# Patient Record
Sex: FEMALE | Race: WHITE | NOT HISPANIC OR LATINO | Employment: OTHER | RURAL
[De-identification: names, ages, dates, MRNs, and addresses within clinical notes are randomized per-mention and may not be internally consistent; named-entity substitution may affect disease eponyms.]

---

## 2020-11-10 ENCOUNTER — HISTORICAL (OUTPATIENT)
Dept: ADMINISTRATIVE | Facility: HOSPITAL | Age: 85
End: 2020-11-10

## 2021-03-19 DIAGNOSIS — I10 HYPERTENSION, UNSPECIFIED TYPE: Primary | ICD-10-CM

## 2021-03-25 RX ORDER — HYDROCHLOROTHIAZIDE 25 MG/1
25 TABLET ORAL DAILY
Qty: 90 TABLET | Refills: 3 | Status: SHIPPED | OUTPATIENT
Start: 2021-03-25 | End: 2021-04-20 | Stop reason: SINTOL

## 2021-04-12 RX ORDER — LISINOPRIL 40 MG/1
40 TABLET ORAL 2 TIMES DAILY
COMMUNITY
Start: 2021-02-26 | End: 2021-04-21 | Stop reason: SDUPTHER

## 2021-04-12 RX ORDER — CALCIUM CARBONATE 600 MG
600 TABLET ORAL 2 TIMES DAILY
COMMUNITY
End: 2022-10-27

## 2021-04-12 RX ORDER — FERROUS SULFATE 325(65) MG
325 TABLET ORAL DAILY
COMMUNITY
End: 2021-05-04

## 2021-04-12 RX ORDER — TRIMETHOPRIM 100 MG/1
100 TABLET ORAL NIGHTLY
COMMUNITY
Start: 2021-02-19 | End: 2022-07-12

## 2021-04-12 RX ORDER — HYDROCODONE BITARTRATE AND ACETAMINOPHEN 5; 325 MG/1; MG/1
1 TABLET ORAL 2 TIMES DAILY PRN
COMMUNITY
Start: 2021-02-08 | End: 2021-10-13

## 2021-04-12 RX ORDER — AMLODIPINE BESYLATE 5 MG/1
2.5 TABLET ORAL DAILY
COMMUNITY
Start: 2021-03-15 | End: 2021-10-13

## 2021-04-12 RX ORDER — ASPIRIN 81 MG/1
81 TABLET ORAL DAILY
Status: ON HOLD | COMMUNITY
End: 2023-10-16 | Stop reason: HOSPADM

## 2021-04-20 ENCOUNTER — OFFICE VISIT (OUTPATIENT)
Dept: CARDIOLOGY | Facility: CLINIC | Age: 86
End: 2021-04-20
Payer: MEDICARE

## 2021-04-20 VITALS
OXYGEN SATURATION: 97 % | HEART RATE: 97 BPM | WEIGHT: 139 LBS | SYSTOLIC BLOOD PRESSURE: 144 MMHG | HEIGHT: 64 IN | BODY MASS INDEX: 23.73 KG/M2 | DIASTOLIC BLOOD PRESSURE: 82 MMHG

## 2021-04-20 DIAGNOSIS — I10 HYPERTENSION, UNSPECIFIED TYPE: Primary | ICD-10-CM

## 2021-04-20 DIAGNOSIS — M54.9 BACK PAIN, UNSPECIFIED BACK LOCATION, UNSPECIFIED BACK PAIN LATERALITY, UNSPECIFIED CHRONICITY: ICD-10-CM

## 2021-04-20 DIAGNOSIS — R60.0 LOWER EXTREMITY EDEMA: ICD-10-CM

## 2021-04-20 PROCEDURE — 93010 ELECTROCARDIOGRAM REPORT: CPT | Mod: S$PBB,,, | Performed by: INTERNAL MEDICINE

## 2021-04-20 PROCEDURE — 99214 PR OFFICE/OUTPT VISIT, EST, LEVL IV, 30-39 MIN: ICD-10-PCS | Mod: S$PBB,,, | Performed by: INTERNAL MEDICINE

## 2021-04-20 PROCEDURE — 99999 PR PBB SHADOW E&M-EST. PATIENT-LVL IV: ICD-10-PCS | Mod: PBBFAC,,, | Performed by: INTERNAL MEDICINE

## 2021-04-20 PROCEDURE — 99214 OFFICE O/P EST MOD 30 MIN: CPT | Mod: S$PBB,,, | Performed by: INTERNAL MEDICINE

## 2021-04-20 PROCEDURE — 93005 ELECTROCARDIOGRAM TRACING: CPT | Mod: PBBFAC | Performed by: INTERNAL MEDICINE

## 2021-04-20 PROCEDURE — 99999 PR PBB SHADOW E&M-EST. PATIENT-LVL IV: CPT | Mod: PBBFAC,,, | Performed by: INTERNAL MEDICINE

## 2021-04-20 PROCEDURE — 99214 OFFICE O/P EST MOD 30 MIN: CPT | Mod: PBBFAC | Performed by: INTERNAL MEDICINE

## 2021-04-20 PROCEDURE — 93010 EKG 12-LEAD: ICD-10-PCS | Mod: S$PBB,,, | Performed by: INTERNAL MEDICINE

## 2021-04-21 RX ORDER — FUROSEMIDE 20 MG/1
20 TABLET ORAL DAILY
Qty: 90 TABLET | Refills: 3 | Status: SHIPPED | OUTPATIENT
Start: 2021-04-21 | End: 2021-06-22

## 2021-04-21 RX ORDER — LISINOPRIL 40 MG/1
40 TABLET ORAL 2 TIMES DAILY
Qty: 180 TABLET | Refills: 3 | Status: SHIPPED | OUTPATIENT
Start: 2021-04-21 | End: 2022-08-26 | Stop reason: SDUPTHER

## 2021-04-25 PROBLEM — R60.0 LOWER EXTREMITY EDEMA: Status: ACTIVE | Noted: 2021-04-25

## 2021-04-25 PROBLEM — I10 HYPERTENSION: Status: ACTIVE | Noted: 2021-04-25

## 2021-04-25 PROBLEM — M54.9 BACK PAIN: Status: ACTIVE | Noted: 2021-04-25

## 2021-05-04 ENCOUNTER — OFFICE VISIT (OUTPATIENT)
Dept: CARDIOLOGY | Facility: CLINIC | Age: 86
End: 2021-05-04
Payer: MEDICARE

## 2021-05-04 VITALS
WEIGHT: 153 LBS | SYSTOLIC BLOOD PRESSURE: 170 MMHG | HEIGHT: 64 IN | BODY MASS INDEX: 26.12 KG/M2 | HEART RATE: 109 BPM | DIASTOLIC BLOOD PRESSURE: 100 MMHG

## 2021-05-04 DIAGNOSIS — R60.0 BILATERAL LOWER EXTREMITY EDEMA: ICD-10-CM

## 2021-05-04 DIAGNOSIS — I10 HYPERTENSION, UNSPECIFIED TYPE: Primary | ICD-10-CM

## 2021-05-04 PROCEDURE — 99214 PR OFFICE/OUTPT VISIT, EST, LEVL IV, 30-39 MIN: ICD-10-PCS | Mod: S$PBB,,, | Performed by: INTERNAL MEDICINE

## 2021-05-04 PROCEDURE — 99214 OFFICE O/P EST MOD 30 MIN: CPT | Mod: PBBFAC | Performed by: INTERNAL MEDICINE

## 2021-05-04 PROCEDURE — 99214 OFFICE O/P EST MOD 30 MIN: CPT | Mod: S$PBB,,, | Performed by: INTERNAL MEDICINE

## 2021-05-04 PROCEDURE — 99214 OFFICE O/P EST MOD 30 MIN: CPT | Mod: PBBFAC,,, | Performed by: INTERNAL MEDICINE

## 2021-05-07 DIAGNOSIS — R60.0 LOWER EXTREMITY EDEMA: Primary | ICD-10-CM

## 2021-05-07 RX ORDER — METOLAZONE 5 MG/1
5 TABLET ORAL DAILY
Qty: 3 TABLET | Refills: 0 | Status: SHIPPED | OUTPATIENT
Start: 2021-05-07 | End: 2021-05-09

## 2021-05-09 RX ORDER — METOLAZONE 2.5 MG/1
TABLET ORAL
Qty: 6 TABLET | Refills: 3 | Status: SHIPPED | OUTPATIENT
Start: 2021-05-09 | End: 2021-10-13

## 2021-06-22 RX ORDER — FUROSEMIDE 20 MG/1
20 TABLET ORAL DAILY
COMMUNITY
End: 2022-08-26 | Stop reason: SDUPTHER

## 2021-10-13 ENCOUNTER — OFFICE VISIT (OUTPATIENT)
Dept: FAMILY MEDICINE | Facility: CLINIC | Age: 86
End: 2021-10-13
Payer: MEDICARE

## 2021-10-13 ENCOUNTER — APPOINTMENT (OUTPATIENT)
Dept: RADIOLOGY | Facility: CLINIC | Age: 86
End: 2021-10-13
Attending: NURSE PRACTITIONER
Payer: MEDICARE

## 2021-10-13 VITALS
SYSTOLIC BLOOD PRESSURE: 142 MMHG | BODY MASS INDEX: 26.58 KG/M2 | DIASTOLIC BLOOD PRESSURE: 70 MMHG | RESPIRATION RATE: 18 BRPM | HEIGHT: 63 IN | HEART RATE: 122 BPM | OXYGEN SATURATION: 98 % | WEIGHT: 150 LBS | TEMPERATURE: 99 F

## 2021-10-13 DIAGNOSIS — M79.671 RIGHT FOOT PAIN: Primary | ICD-10-CM

## 2021-10-13 DIAGNOSIS — M25.571 ACUTE RIGHT ANKLE PAIN: ICD-10-CM

## 2021-10-13 DIAGNOSIS — B35.3 TINEA PEDIS OF RIGHT FOOT: ICD-10-CM

## 2021-10-13 PROCEDURE — 73600 X-RAY EXAM OF ANKLE: CPT | Mod: 26,RT,, | Performed by: RADIOLOGY

## 2021-10-13 PROCEDURE — 73600 XR ANKLE 2 VIEW RIGHT: ICD-10-PCS | Mod: 26,RT,, | Performed by: RADIOLOGY

## 2021-10-13 PROCEDURE — 99213 PR OFFICE/OUTPT VISIT, EST, LEVL III, 20-29 MIN: ICD-10-PCS | Mod: ,,, | Performed by: NURSE PRACTITIONER

## 2021-10-13 PROCEDURE — 73600 X-RAY EXAM OF ANKLE: CPT | Mod: TC,RHCUB,RT | Performed by: NURSE PRACTITIONER

## 2021-10-13 PROCEDURE — 99213 OFFICE O/P EST LOW 20 MIN: CPT | Mod: ,,, | Performed by: NURSE PRACTITIONER

## 2021-10-13 RX ORDER — HYDROCODONE BITARTRATE AND ACETAMINOPHEN 5; 325 MG/1; MG/1
1 TABLET ORAL EVERY 6 HOURS PRN
Qty: 10 TABLET | Refills: 0 | Status: SHIPPED | OUTPATIENT
Start: 2021-10-13 | End: 2022-07-12

## 2021-10-13 RX ORDER — KETOCONAZOLE 20 MG/G
CREAM TOPICAL 2 TIMES DAILY
Qty: 30 G | Refills: 0 | Status: SHIPPED | OUTPATIENT
Start: 2021-10-13 | End: 2022-07-12

## 2021-10-13 RX ORDER — CEPHALEXIN 250 MG/1
250 CAPSULE ORAL 4 TIMES DAILY
Qty: 28 CAPSULE | Refills: 0 | Status: SHIPPED | OUTPATIENT
Start: 2021-10-13 | End: 2022-07-12

## 2021-10-17 ENCOUNTER — HOSPITAL ENCOUNTER (EMERGENCY)
Facility: HOSPITAL | Age: 86
Discharge: HOME | End: 2021-10-17
Attending: EMERGENCY MEDICINE
Payer: MEDICARE

## 2021-10-17 VITALS
TEMPERATURE: 98 F | DIASTOLIC BLOOD PRESSURE: 100 MMHG | BODY MASS INDEX: 25.61 KG/M2 | RESPIRATION RATE: 14 BRPM | WEIGHT: 150 LBS | HEIGHT: 64 IN | HEART RATE: 91 BPM | OXYGEN SATURATION: 97 % | SYSTOLIC BLOOD PRESSURE: 183 MMHG

## 2021-10-17 DIAGNOSIS — R53.1 GENERALIZED WEAKNESS: ICD-10-CM
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
## 2021-10-17 DIAGNOSIS — I10 HYPERTENSION: ICD-10-CM

## 2021-10-17 DIAGNOSIS — R60.0 LOWER EXTREMITY EDEMA: ICD-10-CM
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
## 2021-10-17 DIAGNOSIS — M19.90 OSTEOARTHRITIS, UNSPECIFIED OSTEOARTHRITIS TYPE, UNSPECIFIED SITE: ICD-10-CM

## 2021-10-17 DIAGNOSIS — M54.9 BACK PAIN: ICD-10-CM

## 2021-10-17 LAB
ALBUMIN SERPL BCP-MCNC: 3.5 G/DL (ref 3.5–5)
ALBUMIN/GLOB SERPL: 0.9 {RATIO}
ALP SERPL-CCNC: 103 U/L (ref 55–142)
ALT SERPL W P-5'-P-CCNC: 27 U/L (ref 13–56)
ANION GAP SERPL CALCULATED.3IONS-SCNC: 12 MMOL/L (ref 7–16)
APTT PPP: 34.3 SECONDS (ref 25.2–37.3)
AST SERPL W P-5'-P-CCNC: 23 U/L (ref 15–37)
BASOPHILS # BLD AUTO: 0.03 K/UL (ref 0–0.2)
BASOPHILS NFR BLD AUTO: 0.3 % (ref 0–1)
BILIRUB SERPL-MCNC: 0.4 MG/DL (ref 0–1.2)
BILIRUB UR QL STRIP: NEGATIVE
BUN SERPL-MCNC: 14 MG/DL (ref 7–18)
BUN/CREAT SERPL: 15 (ref 6–20)
CALCIUM SERPL-MCNC: 9.8 MG/DL (ref 8.5–10.1)
CHLORIDE SERPL-SCNC: 98 MMOL/L (ref 98–107)
CLARITY UR: CLEAR
CO2 SERPL-SCNC: 32 MMOL/L (ref 21–32)
COLOR UR: NORMAL
CREAT SERPL-MCNC: 0.95 MG/DL (ref 0.55–1.02)
DIFFERENTIAL METHOD BLD: ABNORMAL
EOSINOPHIL # BLD AUTO: 0.01 K/UL (ref 0–0.5)
EOSINOPHIL NFR BLD AUTO: 0.1 % (ref 1–4)
ERYTHROCYTE [DISTWIDTH] IN BLOOD BY AUTOMATED COUNT: 14.4 % (ref 11.5–14.5)
GLOBULIN SER-MCNC: 3.9 G/DL (ref 2–4)
GLUCOSE SERPL-MCNC: 99 MG/DL (ref 74–106)
GLUCOSE UR STRIP-MCNC: NEGATIVE MG/DL
HCT VFR BLD AUTO: 42.2 % (ref 38–47)
HGB BLD-MCNC: 13.8 G/DL (ref 12–16)
IMM GRANULOCYTES # BLD AUTO: 0.03 K/UL (ref 0–0.04)
IMM GRANULOCYTES NFR BLD: 0.3 % (ref 0–0.4)
INR BLD: 0.96 (ref 0.9–1.1)
KETONES UR STRIP-SCNC: NEGATIVE MG/DL
LEUKOCYTE ESTERASE UR QL STRIP: NEGATIVE
LYMPHOCYTES # BLD AUTO: 1.88 K/UL (ref 1–4.8)
LYMPHOCYTES NFR BLD AUTO: 21.1 % (ref 27–41)
MAGNESIUM SERPL-MCNC: 2.2 MG/DL (ref 1.7–2.3)
MCH RBC QN AUTO: 28.3 PG (ref 27–31)
MCHC RBC AUTO-ENTMCNC: 32.7 G/DL (ref 32–36)
MCV RBC AUTO: 86.5 FL (ref 80–96)
MONOCYTES # BLD AUTO: 0.63 K/UL (ref 0–0.8)
MONOCYTES NFR BLD AUTO: 7.1 % (ref 2–6)
MPC BLD CALC-MCNC: 8.8 FL (ref 9.4–12.4)
NEUTROPHILS # BLD AUTO: 6.33 K/UL (ref 1.8–7.7)
NEUTROPHILS NFR BLD AUTO: 71.1 % (ref 53–65)
NITRITE UR QL STRIP: NEGATIVE
NRBC # BLD AUTO: 0 X10E3/UL
NRBC, AUTO (.00): 0 %
PH UR STRIP: 8 PH UNITS
PLATELET # BLD AUTO: 320 K/UL (ref 150–400)
POTASSIUM SERPL-SCNC: 3.7 MMOL/L (ref 3.5–5.1)
PROT SERPL-MCNC: 7.4 G/DL (ref 6.4–8.2)
PROT UR QL STRIP: NEGATIVE
PROTHROMBIN TIME: 12.8 SECONDS (ref 11.7–14.7)
RBC # BLD AUTO: 4.88 M/UL (ref 4.2–5.4)
RBC # UR STRIP: NEGATIVE /UL
SODIUM SERPL-SCNC: 138 MMOL/L (ref 136–145)
SP GR UR STRIP: 1.01
TROPONIN I SERPL-MCNC: <0.017 NG/ML
UROBILINOGEN UR STRIP-ACNC: 0.2 MG/DL
WBC # BLD AUTO: 8.91 K/UL (ref 4.5–11)

## 2021-10-17 PROCEDURE — 84484 ASSAY OF TROPONIN QUANT: CPT | Performed by: EMERGENCY MEDICINE

## 2021-10-17 PROCEDURE — 99283 EMERGENCY DEPT VISIT LOW MDM: CPT | Mod: ,,, | Performed by: EMERGENCY MEDICINE

## 2021-10-17 PROCEDURE — 85610 PROTHROMBIN TIME: CPT | Performed by: EMERGENCY MEDICINE

## 2021-10-17 PROCEDURE — 85025 COMPLETE CBC W/AUTO DIFF WBC: CPT | Performed by: EMERGENCY MEDICINE

## 2021-10-17 PROCEDURE — 81003 URINALYSIS AUTO W/O SCOPE: CPT | Performed by: EMERGENCY MEDICINE

## 2021-10-17 PROCEDURE — 99285 EMERGENCY DEPT VISIT HI MDM: CPT | Mod: 25

## 2021-10-17 PROCEDURE — 83735 ASSAY OF MAGNESIUM: CPT | Performed by: EMERGENCY MEDICINE

## 2021-10-17 PROCEDURE — 93010 EKG 12-LEAD: ICD-10-PCS | Mod: ,,, | Performed by: INTERNAL MEDICINE

## 2021-10-17 PROCEDURE — 36415 COLL VENOUS BLD VENIPUNCTURE: CPT | Performed by: EMERGENCY MEDICINE

## 2021-10-17 PROCEDURE — 93005 ELECTROCARDIOGRAM TRACING: CPT

## 2021-10-17 PROCEDURE — 99283 PR EMERGENCY DEPT VISIT,LEVEL III: ICD-10-PCS | Mod: ,,, | Performed by: EMERGENCY MEDICINE

## 2021-10-17 PROCEDURE — 93010 ELECTROCARDIOGRAM REPORT: CPT | Mod: ,,, | Performed by: INTERNAL MEDICINE

## 2021-10-17 PROCEDURE — 80053 COMPREHEN METABOLIC PANEL: CPT | Performed by: EMERGENCY MEDICINE

## 2021-10-17 PROCEDURE — 85730 THROMBOPLASTIN TIME PARTIAL: CPT | Performed by: EMERGENCY MEDICINE

## 2022-01-14 ENCOUNTER — CLINICAL SUPPORT (OUTPATIENT)
Dept: UROLOGY | Facility: CLINIC | Age: 87
End: 2022-01-14
Payer: MEDICARE

## 2022-01-14 DIAGNOSIS — N39.0 URINARY TRACT INFECTION WITHOUT HEMATURIA, SITE UNSPECIFIED: Primary | ICD-10-CM

## 2022-01-14 PROCEDURE — 87086 CULTURE, URINE: ICD-10-PCS | Mod: ,,, | Performed by: CLINICAL MEDICAL LABORATORY

## 2022-01-14 PROCEDURE — 87186 SC STD MICRODIL/AGAR DIL: CPT | Mod: ,,, | Performed by: CLINICAL MEDICAL LABORATORY

## 2022-01-14 PROCEDURE — 87077 CULTURE AEROBIC IDENTIFY: CPT | Mod: ,,, | Performed by: CLINICAL MEDICAL LABORATORY

## 2022-01-14 PROCEDURE — 87077 CULTURE, URINE: ICD-10-PCS | Mod: ,,, | Performed by: CLINICAL MEDICAL LABORATORY

## 2022-01-14 PROCEDURE — 99212 OFFICE O/P EST SF 10 MIN: CPT | Mod: PBBFAC | Performed by: UROLOGY

## 2022-01-14 PROCEDURE — 87186 CULTURE, URINE: ICD-10-PCS | Mod: ,,, | Performed by: CLINICAL MEDICAL LABORATORY

## 2022-01-14 PROCEDURE — 87086 URINE CULTURE/COLONY COUNT: CPT | Mod: ,,, | Performed by: CLINICAL MEDICAL LABORATORY

## 2022-01-14 NOTE — PROGRESS NOTES
Patient's family brought a urine specimen by to be sent for culture due to painful urinating. Cipro 500mg one bid #6 samples given pending results.

## 2022-01-16 LAB
UA COMPLETE W REFLEX CULTURE PNL UR: ABNORMAL
UA COMPLETE W REFLEX CULTURE PNL UR: ABNORMAL

## 2022-01-17 DIAGNOSIS — N39.0 URINARY TRACT INFECTION WITHOUT HEMATURIA, SITE UNSPECIFIED: Primary | ICD-10-CM

## 2022-01-17 RX ORDER — FLUCONAZOLE 100 MG/1
100 TABLET ORAL DAILY
Qty: 8 TABLET | Refills: 0 | Status: SHIPPED | OUTPATIENT
Start: 2022-01-17 | End: 2022-07-12

## 2022-01-17 RX ORDER — CEFUROXIME AXETIL 250 MG/1
250 TABLET ORAL 2 TIMES DAILY
Qty: 20 TABLET | Refills: 0 | Status: SHIPPED | OUTPATIENT
Start: 2022-01-17 | End: 2022-01-27

## 2022-01-25 NOTE — PROGRESS NOTES
Cardiology Clinic Note:    PCP: Primary Doctor No    REFERRING PHYSICIAN: Primary Doctor No    CHIEF COMPLAINT: No chief complaint on file.       HISTORY OF PRESENT ILLNESS:  Purnima Cardona is a 88 y.o. female who presents for evaluation of hypertension.  Daughter presents with pt.     Pt reports blood pressure has been  elevated. Patient states she is walking around with walker.    Patient denies dizziness.  Denies chest pain, pressure, tightness or squeezing.      Review of Systems   Constitutional: Negative for diaphoresis, malaise/fatigue, night sweats and weight gain.   HENT: Negative for congestion, ear pain, hearing loss, nosebleeds and sore throat.    Eyes: Negative for blurred vision, double vision, pain, photophobia and visual disturbance.   Cardiovascular: Negative for chest pain, claudication, dyspnea on exertion, irregular heartbeat, leg swelling, near-syncope, orthopnea, palpitations and syncope.   Respiratory: Negative for cough, shortness of breath, sleep disturbances due to breathing, snoring and wheezing.    Endocrine: Negative for cold intolerance, heat intolerance, polydipsia, polyphagia and polyuria.   Hematologic/Lymphatic: Negative for bleeding problem. Does not bruise/bleed easily.   Skin: Negative for dry skin, flushing, itching, rash and skin cancer.   Musculoskeletal: Negative for arthritis, back pain, falls, joint pain, muscle cramps, muscle weakness and myalgias.   Gastrointestinal: Negative for abdominal pain, change in bowel habit, constipation, diarrhea, dysphagia, heartburn, nausea and vomiting.   Genitourinary: Negative for bladder incontinence, dysuria, flank pain, frequency and nocturia.   Neurological: Negative for dizziness, focal weakness, headaches, light-headedness, loss of balance, numbness, paresthesias and seizures.   Psychiatric/Behavioral: Negative for depression, memory loss and substance abuse. The patient is not nervous/anxious.    Allergic/Immunologic: Negative  for environmental allergies.          PAST MEDICAL HISTORY:  Past Medical History:   Diagnosis Date    Abnormal EKG     Essential hypertension     Hyperlipidemia     Shortness of breath        PAST SURGICAL HISTORY:  No past surgical history on file.    SOCIAL HISTORY:  Social History     Socioeconomic History    Marital status:    Tobacco Use    Smoking status: Never Smoker    Smokeless tobacco: Never Used   Substance and Sexual Activity    Alcohol use: Never    Drug use: Never    Sexual activity: Not Currently       FAMILY HISTORY:  Family History   Problem Relation Age of Onset    Breast cancer Mother     Stroke Father        ALLERGIES:  Allergies as of 2022 - Reviewed 10/17/2021   Allergen Reaction Noted    Clonidine  2021    Hiprex [methenamine hippurate]  2021    Sulfa (sulfonamide antibiotics)  2021         MEDICATIONS:  Current Outpatient Medications on File Prior to Visit   Medication Sig Dispense Refill    aspirin (ECOTRIN) 81 MG EC tablet Take 81 mg by mouth once daily.      calcium carbonate (OS-NASREEN) 600 mg calcium (1,500 mg) Tab Take 600 mg by mouth 2 (two) times a day.      [] cefUROXime (CEFTIN) 250 MG tablet Take 1 tablet (250 mg total) by mouth 2 (two) times daily. for 10 days 20 tablet 0    furosemide (LASIX) 20 MG tablet Take 20 mg by mouth 3 (three) times a week. Take 2 tablets(20mg) in the AM on I[facility]xs-Vsbn[facility]zhij      lisinopriL (PRINIVIL,ZESTRIL) 40 MG tablet Take 1 tablet (40 mg total) by mouth 2 (two) times daily. 180 tablet 3    potassium 99 mg Tab Take 1 tablet by mouth once daily.      cephALEXin (KEFLEX) 250 MG capsule Take 1 capsule (250 mg total) by mouth 4 (four) times daily. (Patient not taking: Reported on 2022) 28 capsule 0    fluconazole (DIFLUCAN) 100 MG tablet Take 1 tablet (100 mg total) by mouth once daily. Patient to take 2 tablets by mouth on day one (1), then 1 tablet by mouth daily thereafter (Patient  "not taking: Reported on 1/26/2022) 8 tablet 0    HYDROcodone-acetaminophen (NORCO) 5-325 mg per tablet Take 1 tablet by mouth every 6 (six) hours as needed for Pain. (Patient not taking: Reported on 1/26/2022) 10 tablet 0    ketoconazole (NIZORAL) 2 % cream Apply topically 2 (two) times daily. To affected area (Patient not taking: Reported on 1/26/2022) 30 g 0    trimethoprim (TRIMPEX) 100 mg Tab Take 100 mg by mouth every evening.       No current facility-administered medications on file prior to visit.          PHYSICAL EXAM:  Blood pressure (!) 148/96, pulse 107, resp. rate 16, height 5' 4" (1.626 m), weight 60.3 kg (133 lb), SpO2 96 %.  Wt Readings from Last 3 Encounters:   01/26/22 60.3 kg (133 lb)   10/17/21 68 kg (150 lb)   10/13/21 68 kg (150 lb)      Body mass index is 22.83 kg/m².    Physical Exam  Vitals and nursing note reviewed.   Constitutional:       Appearance: Normal appearance. She is normal weight.   HENT:      Head: Normocephalic and atraumatic.      Right Ear: External ear normal.      Left Ear: External ear normal.   Eyes:      General: No scleral icterus.        Right eye: No discharge.         Left eye: No discharge.      Extraocular Movements: Extraocular movements intact.      Conjunctiva/sclera: Conjunctivae normal.      Pupils: Pupils are equal, round, and reactive to light.   Cardiovascular:      Rate and Rhythm: Normal rate and regular rhythm.      Pulses: Normal pulses.      Heart sounds: Normal heart sounds. No murmur heard.  No friction rub. No gallop.    Pulmonary:      Effort: Pulmonary effort is normal.      Breath sounds: Normal breath sounds. No wheezing, rhonchi or rales.   Chest:      Chest wall: No tenderness.   Abdominal:      General: Abdomen is flat. Bowel sounds are normal. There is no distension.      Palpations: Abdomen is soft.      Tenderness: There is no abdominal tenderness. There is no guarding or rebound.   Musculoskeletal:         General: No swelling or " tenderness. Normal range of motion.      Cervical back: Normal range of motion and neck supple.   Skin:     General: Skin is warm and dry.      Findings: No erythema or rash.   Neurological:      General: No focal deficit present.      Mental Status: She is alert and oriented to person, place, and time.      Cranial Nerves: No cranial nerve deficit.      Motor: No weakness.      Gait: Gait normal.   Psychiatric:         Mood and Affect: Mood normal.         Behavior: Behavior normal.         Thought Content: Thought content normal.         Judgment: Judgment normal.          LABS REVIEWED:  Lab Results   Component Value Date    WBC 8.91 10/17/2021    RBC 4.88 10/17/2021    HGB 13.8 10/17/2021    HCT 42.2 10/17/2021    MCV 86.5 10/17/2021    MCH 28.3 10/17/2021    MCHC 32.7 10/17/2021    RDW 14.4 10/17/2021     10/17/2021    MPV 8.8 (L) 10/17/2021    NRBC 0.0 10/17/2021    INR 0.96 10/17/2021     Lab Results   Component Value Date     10/17/2021    K 3.7 10/17/2021    CL 98 10/17/2021    CO2 32 10/17/2021    BUN 14 10/17/2021    MG 2.2 10/17/2021     Lab Results   Component Value Date    AST 23 10/17/2021    ALT 27 10/17/2021     Lab Results   Component Value Date    GLU 99 10/17/2021     No results found for: CHOL, HDL, TRIG, CHOLHDL    CARDIAC STUDIES REVIEWED:    OTHER IMAGING STUDIES REVIEWED:        ASSESSMENT:   There are no diagnoses linked to this encounter.      PLAN:  1.  Hypertension, not controlled, restart amlodipine 5 mg once daily  2.  Lower extremity edema, improved on Lasix   3.  Falls - orthostatics checked  130/80 sitting, decreased to 110/70 standing, asymptomatic, discussed careful positional changes. .

## 2022-01-26 ENCOUNTER — DOCUMENTATION ONLY (OUTPATIENT)
Dept: CARDIOLOGY | Facility: CLINIC | Age: 87
End: 2022-01-26
Payer: MEDICARE

## 2022-01-26 ENCOUNTER — OFFICE VISIT (OUTPATIENT)
Dept: CARDIOLOGY | Facility: CLINIC | Age: 87
End: 2022-01-26
Payer: MEDICARE

## 2022-01-26 VITALS
WEIGHT: 133 LBS | BODY MASS INDEX: 22.71 KG/M2 | HEART RATE: 107 BPM | OXYGEN SATURATION: 96 % | DIASTOLIC BLOOD PRESSURE: 96 MMHG | SYSTOLIC BLOOD PRESSURE: 148 MMHG | HEIGHT: 64 IN | RESPIRATION RATE: 16 BRPM

## 2022-01-26 DIAGNOSIS — I10 ESSENTIAL HYPERTENSION: Primary | ICD-10-CM

## 2022-01-26 DIAGNOSIS — R42 ORTHOSTATIC DIZZINESS: ICD-10-CM

## 2022-01-26 DIAGNOSIS — R60.0 LOWER EXTREMITY EDEMA: ICD-10-CM

## 2022-01-26 PROCEDURE — 99214 OFFICE O/P EST MOD 30 MIN: CPT | Mod: PBBFAC | Performed by: INTERNAL MEDICINE

## 2022-01-26 PROCEDURE — 99214 OFFICE O/P EST MOD 30 MIN: CPT | Mod: S$PBB,,, | Performed by: INTERNAL MEDICINE

## 2022-01-26 PROCEDURE — 99214 PR OFFICE/OUTPT VISIT, EST, LEVL IV, 30-39 MIN: ICD-10-PCS | Mod: S$PBB,,, | Performed by: INTERNAL MEDICINE

## 2022-01-26 NOTE — PATIENT INSTRUCTIONS
Amlodipine 5 mg once daily   Lasix 20 mg   Protonix 40 mg daily   Lisinopril 40 mg twice daily     Walk 5 min twice daily.   Stand during commericals while watching TV.

## 2022-01-26 NOTE — PROGRESS NOTES
Call  Pt med in to Mr. Kendrick in [place] Lasix 20 mg, Norvasc 5 mg, Protonix 40 mg and lisinopril 40 mg BID

## 2022-02-02 PROBLEM — R42 ORTHOSTATIC DIZZINESS: Status: ACTIVE | Noted: 2022-02-02

## 2022-02-09 ENCOUNTER — HOSPITAL ENCOUNTER (EMERGENCY)
Facility: HOSPITAL | Age: 87
Discharge: HOME | End: 2022-02-09
Attending: EMERGENCY MEDICINE
Payer: MEDICARE

## 2022-02-09 VITALS
TEMPERATURE: 99 F | DIASTOLIC BLOOD PRESSURE: 63 MMHG | RESPIRATION RATE: 19 BRPM | HEIGHT: 64 IN | WEIGHT: 133 LBS | SYSTOLIC BLOOD PRESSURE: 130 MMHG | HEART RATE: 90 BPM | OXYGEN SATURATION: 96 % | BODY MASS INDEX: 22.71 KG/M2

## 2022-02-09 DIAGNOSIS — I49.1 PREMATURE ATRIAL CONTRACTIONS: Primary | ICD-10-CM

## 2022-02-09 DIAGNOSIS — R00.0 TACHYCARDIA: ICD-10-CM

## 2022-02-09 LAB
ALBUMIN SERPL BCP-MCNC: 3.2 G/DL (ref 3.5–5)
ALBUMIN/GLOB SERPL: 0.7 {RATIO}
ALP SERPL-CCNC: 84 U/L (ref 55–142)
ALT SERPL W P-5'-P-CCNC: 20 U/L (ref 13–56)
ANION GAP SERPL CALCULATED.3IONS-SCNC: 6 MMOL/L (ref 7–16)
AST SERPL W P-5'-P-CCNC: 24 U/L (ref 15–37)
BASOPHILS # BLD AUTO: 0.03 K/UL (ref 0–0.2)
BASOPHILS NFR BLD AUTO: 0.3 % (ref 0–1)
BILIRUB SERPL-MCNC: 0.3 MG/DL (ref 0–1.2)
BUN SERPL-MCNC: 23 MG/DL (ref 7–18)
BUN/CREAT SERPL: 25 (ref 6–20)
CALCIUM SERPL-MCNC: 9.2 MG/DL (ref 8.5–10.1)
CHLORIDE SERPL-SCNC: 101 MMOL/L (ref 98–107)
CO2 SERPL-SCNC: 30 MMOL/L (ref 21–32)
CREAT SERPL-MCNC: 0.93 MG/DL (ref 0.55–1.02)
DIFFERENTIAL METHOD BLD: ABNORMAL
EOSINOPHIL # BLD AUTO: 0.01 K/UL (ref 0–0.5)
EOSINOPHIL NFR BLD AUTO: 0.1 % (ref 1–4)
ERYTHROCYTE [DISTWIDTH] IN BLOOD BY AUTOMATED COUNT: 15.7 % (ref 11.5–14.5)
GLOBULIN SER-MCNC: 4.4 G/DL (ref 2–4)
GLUCOSE SERPL-MCNC: 255 MG/DL (ref 74–106)
HCT VFR BLD AUTO: 39.2 % (ref 38–47)
HGB BLD-MCNC: 12.7 G/DL (ref 12–16)
IMM GRANULOCYTES # BLD AUTO: 0.03 K/UL (ref 0–0.04)
IMM GRANULOCYTES NFR BLD: 0.3 % (ref 0–0.4)
LYMPHOCYTES # BLD AUTO: 2.84 K/UL (ref 1–4.8)
LYMPHOCYTES NFR BLD AUTO: 28.9 % (ref 27–41)
MAGNESIUM SERPL-MCNC: 2.2 MG/DL (ref 1.7–2.3)
MCH RBC QN AUTO: 28.2 PG (ref 27–31)
MCHC RBC AUTO-ENTMCNC: 32.4 G/DL (ref 32–36)
MCV RBC AUTO: 86.9 FL (ref 80–96)
MONOCYTES # BLD AUTO: 0.79 K/UL (ref 0–0.8)
MONOCYTES NFR BLD AUTO: 8 % (ref 2–6)
MPC BLD CALC-MCNC: 10.6 FL (ref 9.4–12.4)
NEUTROPHILS # BLD AUTO: 6.12 K/UL (ref 1.8–7.7)
NEUTROPHILS NFR BLD AUTO: 62.4 % (ref 53–65)
NRBC # BLD AUTO: 0 X10E3/UL
NRBC, AUTO (.00): 0 %
PLATELET # BLD AUTO: 287 K/UL (ref 150–400)
POTASSIUM SERPL-SCNC: 3.6 MMOL/L (ref 3.5–5.1)
PROT SERPL-MCNC: 7.6 G/DL (ref 6.4–8.2)
RBC # BLD AUTO: 4.51 M/UL (ref 4.2–5.4)
SODIUM SERPL-SCNC: 133 MMOL/L (ref 136–145)
WBC # BLD AUTO: 9.82 K/UL (ref 4.5–11)

## 2022-02-09 PROCEDURE — 99284 EMERGENCY DEPT VISIT MOD MDM: CPT

## 2022-02-09 PROCEDURE — 93005 ELECTROCARDIOGRAM TRACING: CPT

## 2022-02-09 PROCEDURE — 80053 COMPREHEN METABOLIC PANEL: CPT | Performed by: EMERGENCY MEDICINE

## 2022-02-09 PROCEDURE — 99282 EMERGENCY DEPT VISIT SF MDM: CPT | Mod: ,,, | Performed by: EMERGENCY MEDICINE

## 2022-02-09 PROCEDURE — 99282 PR EMERGENCY DEPT VISIT,LEVEL II: ICD-10-PCS | Mod: ,,, | Performed by: EMERGENCY MEDICINE

## 2022-02-09 PROCEDURE — 85025 COMPLETE CBC W/AUTO DIFF WBC: CPT | Performed by: EMERGENCY MEDICINE

## 2022-02-09 PROCEDURE — 83735 ASSAY OF MAGNESIUM: CPT | Performed by: EMERGENCY MEDICINE

## 2022-02-09 PROCEDURE — 36415 COLL VENOUS BLD VENIPUNCTURE: CPT | Performed by: EMERGENCY MEDICINE

## 2022-02-09 PROCEDURE — 93010 ELECTROCARDIOGRAM REPORT: CPT | Mod: ,,, | Performed by: INTERNAL MEDICINE

## 2022-02-09 PROCEDURE — 93010 EKG 12-LEAD: ICD-10-PCS | Mod: ,,, | Performed by: INTERNAL MEDICINE

## 2022-02-09 NOTE — DISCHARGE INSTRUCTIONS
DRINK PLENTY OF FLUIDS.  CONTINUE YOUR CURRENT MEDICATIONS.  FOLLOW UP IF SYMPTOMS PERSIST OR WORSEN OR OTHERWISE AS NEEDED.

## 2022-02-09 NOTE — ED PROVIDER NOTES
Encounter Date: 2/9/2022    SCRIBE #1 NOTE: I, Adore Glovre, am scribing for, and in the presence of,  Sammy Moore MD. I have scribed the entire note.       History     Chief Complaint   Patient presents with    Tachycardia     Patient is an 88 year old female who presents to the emergency department due to tachycardia noticed by her home health nurse. Patient explains that she was doing a puzzle this afternoon when her home health nurse checked her pulse and suspected atrial fibrillation. Patient was transported to our facility by EMS. Upon arrival patient denies any shortness of breath, chest pain, or palpations.     The history is provided by the patient. No  was used.     Review of patient's allergies indicates:   Allergen Reactions    Clonidine     Hiprex [methenamine hippurate]     Sulfa (sulfonamide antibiotics)      Past Medical History:   Diagnosis Date    Abnormal EKG     Essential hypertension     Hyperlipidemia     Shortness of breath      History reviewed. No pertinent surgical history.  Family History   Problem Relation Age of Onset    Breast cancer Mother     Stroke Father      Social History     Tobacco Use    Smoking status: Never Smoker    Smokeless tobacco: Never Used   Substance Use Topics    Alcohol use: Never    Drug use: Never     Review of Systems   Constitutional: Negative.    HENT: Negative.    Eyes: Negative.    Respiratory: Negative.  Negative for shortness of breath.    Cardiovascular: Negative.  Negative for chest pain and palpitations.   Gastrointestinal: Negative.    Endocrine: Negative.    Genitourinary: Negative.    Musculoskeletal: Negative.    Skin: Negative.    Allergic/Immunologic: Negative.    Neurological: Negative.    Hematological: Negative.    Psychiatric/Behavioral: Negative.    All other systems reviewed and are negative.      Physical Exam     Initial Vitals [02/09/22 1419]   BP Pulse Resp Temp SpO2   138/62 99 19 98.8 °F (37.1 °C)  96 %      MAP       --         Physical Exam    Nursing note and vitals reviewed.  Constitutional: She appears well-developed and well-nourished.   HENT:   Head: Normocephalic and atraumatic.   Eyes: Conjunctivae and EOM are normal. Pupils are equal, round, and reactive to light.   Neck: Neck supple.   Normal range of motion.  Cardiovascular: Normal rate, regular rhythm, normal heart sounds and intact distal pulses.   Pulmonary/Chest: Breath sounds normal.   Abdominal: Abdomen is soft. Bowel sounds are normal.   Musculoskeletal:         General: Normal range of motion.      Cervical back: Normal range of motion and neck supple.     Neurological: She is alert and oriented to person, place, and time. She has normal strength.   Skin: Skin is warm and dry. Capillary refill takes less than 2 seconds.   Psychiatric: She has a normal mood and affect. Thought content normal.         Medical Screening Exam   See Full Note    ED Course   Procedures  Labs Reviewed   COMPREHENSIVE METABOLIC PANEL - Abnormal; Notable for the following components:       Result Value    Sodium 133 (*)     Anion Gap 6 (*)     Glucose 255 (*)     BUN 23 (*)     BUN/Creatinine Ratio 25 (*)     Albumin 3.2 (*)     Globulin 4.4 (*)     All other components within normal limits   CBC WITH DIFFERENTIAL - Abnormal; Notable for the following components:    RDW 15.7 (*)     Monocytes % 8.0 (*)     Eosinophils % 0.1 (*)     All other components within normal limits   MAGNESIUM - Normal   CBC W/ AUTO DIFFERENTIAL    Narrative:     The following orders were created for panel order CBC auto differential.  Procedure                               Abnormality         Status                     ---------                               -----------         ------                     CBC with Differential[[id number]]        Abnormal            Final result                 Please view results for these tests on the individual orders.   EXTRA TUBES    Narrative:     The  following orders were created for panel order EXTRA TUBES.  Procedure                               Abnormality         Status                     ---------                               -----------         ------                     Light Blue Top Hold[[id number]]                              In process                 Red Top Hold[[id number]]                                     In process                 Red Top Hold[[id number]]                                     In process                   Please view results for these tests on the individual orders.   LIGHT BLUE TOP HOLD   RED TOP HOLD   RED TOP HOLD          Imaging Results    None          Medications - No data to display             Attending Attestation:           Physician Attestation for Scribe:  Physician Attestation Statement for Scribe #1: I, GENARO MORALEZ, reviewed documentation, as scribed by ARIELA TESFAYE in my presence, and it is both accurate and complete.                   Clinical Impression:   Final diagnoses:  [R00.0] Tachycardia  [I49.1] Premature atrial contractions (Primary)          ED Disposition Condition    Discharge Stable        ED Prescriptions     None        Follow-up Information     Follow up With Specialties Details Why Contact Info    PRIMARY CARE PROVIDER               Genaro Moralez MD  02/09/22 1804

## 2022-06-01 ENCOUNTER — DOCUMENTATION ONLY (OUTPATIENT)
Dept: CARDIOLOGY | Facility: CLINIC | Age: 87
End: 2022-06-01
Payer: MEDICARE

## 2022-06-01 RX ORDER — PANTOPRAZOLE SODIUM 40 MG/1
40 TABLET, DELAYED RELEASE ORAL DAILY
COMMUNITY
End: 2022-07-12 | Stop reason: SDUPTHER

## 2022-06-01 RX ORDER — AMLODIPINE BESYLATE 5 MG/1
5 TABLET ORAL DAILY
COMMUNITY
End: 2022-08-26 | Stop reason: SDUPTHER

## 2022-06-10 DIAGNOSIS — Z71.89 COMPLEX CARE COORDINATION: ICD-10-CM

## 2022-07-09 ENCOUNTER — HOSPITAL ENCOUNTER (EMERGENCY)
Facility: HOSPITAL | Age: 87
Discharge: HOME | End: 2022-07-09
Attending: EMERGENCY MEDICINE
Payer: MEDICARE

## 2022-07-09 VITALS
BODY MASS INDEX: 22.58 KG/M2 | RESPIRATION RATE: 18 BRPM | SYSTOLIC BLOOD PRESSURE: 169 MMHG | WEIGHT: 132.25 LBS | DIASTOLIC BLOOD PRESSURE: 70 MMHG | OXYGEN SATURATION: 99 % | HEART RATE: 85 BPM | HEIGHT: 64 IN | TEMPERATURE: 98 F

## 2022-07-09 DIAGNOSIS — S86.911A MUSCLE STRAIN OF RIGHT LOWER LEG, INITIAL ENCOUNTER: Primary | ICD-10-CM

## 2022-07-09 PROCEDURE — 99283 PR EMERGENCY DEPT VISIT,LEVEL III: ICD-10-PCS | Mod: ,,, | Performed by: EMERGENCY MEDICINE

## 2022-07-09 PROCEDURE — 99283 EMERGENCY DEPT VISIT LOW MDM: CPT | Mod: ,,, | Performed by: EMERGENCY MEDICINE

## 2022-07-09 PROCEDURE — 99281 EMR DPT VST MAYX REQ PHY/QHP: CPT

## 2022-07-09 NOTE — DISCHARGE INSTRUCTIONS
Patient was treated urine of downtime of epic.  She was prescribed Norco 05/03/2025 10. She was instructed to use ibuprofen Tylenol and ice as needed.

## 2022-07-09 NOTE — ED PROVIDER NOTES
Encounter Date: 7/9/2022       History   No chief complaint on file.    Patient complains of severe pain to the right she in on the anterior lateral aspect that eventually resolved after treatment with ibuprofen and Tylenol.  Patient has been ambulating more than usual has soreness in that area.  Her pain has resolved.  No associated swelling.  No injuries.  No other associated symptoms modifying factors        Review of patient's allergies indicates:   Allergen Reactions    Clonidine     Hiprex [methenamine hippurate]     Sulfa (sulfonamide antibiotics)      Past Medical History:   Diagnosis Date    Abnormal EKG     Essential hypertension     Hyperlipidemia     Shortness of breath      No past surgical history on file.  Family History   Problem Relation Age of Onset    Breast cancer Mother     Stroke Father      Social History     Tobacco Use    Smoking status: Never Smoker    Smokeless tobacco: Never Used   Substance Use Topics    Alcohol use: Never    Drug use: Never     Review of Systems   Constitutional: Negative for fever.   HENT: Negative for sore throat.    Respiratory: Negative for shortness of breath.    Cardiovascular: Negative for chest pain.   Gastrointestinal: Negative for nausea.   Genitourinary: Negative for dysuria.   Musculoskeletal: Negative for back pain.   Skin: Negative for rash.   Neurological: Negative for weakness.   Hematological: Does not bruise/bleed easily.       Physical Exam     Initial Vitals   BP Pulse Resp Temp SpO2   -- -- -- -- --      MAP       --         Physical Exam    Constitutional: She appears well-developed and well-nourished.   HENT:   Head: Normocephalic and atraumatic.   Eyes: EOM are normal. Pupils are equal, round, and reactive to light.   Neck: Neck supple.   Normal range of motion.  Cardiovascular: Normal rate and regular rhythm.   Pulmonary/Chest: Breath sounds normal.   Abdominal: Abdomen is soft. She exhibits no distension. There is no abdominal  tenderness. There is no rebound and no guarding.   Musculoskeletal:         General: Normal range of motion.      Cervical back: Normal range of motion and neck supple.      Comments: Examination lower extremity shows no swelling.  Dorsalis pedis 2+ bilateral.  Normal coloration the.  No medial tenderness.  Patient has some mild tenderness of the right anterior lateral shin.  Full range of motion ankle and knee.     Neurological: She is alert and oriented to person, place, and time. GCS score is 15. GCS eye subscore is 4. GCS verbal subscore is 5. GCS motor subscore is 6.   Skin: Skin is warm and dry. Capillary refill takes less than 2 seconds.   Psychiatric: She has a normal mood and affect.         Medical Screening Exam   See Full Note    ED Course   Procedures  Labs Reviewed - No data to display       Imaging Results    None          Medications - No data to display  Medical Decision Making:   ED Management:  Patient was treated urine of downtime of epic.  She was prescribed Norco 05/03/2025 10. She was instructed to use ibuprofen Tylenol and ice as needed.                   Clinical Impression:   Final diagnoses:  [I78.634L] Muscle strain of right lower leg, initial encounter (Primary)                 Roger Patel MD  07/09/22 0559

## 2022-07-12 ENCOUNTER — OFFICE VISIT (OUTPATIENT)
Dept: FAMILY MEDICINE | Facility: CLINIC | Age: 87
End: 2022-07-12
Payer: MEDICARE

## 2022-07-12 VITALS
TEMPERATURE: 99 F | HEIGHT: 64 IN | DIASTOLIC BLOOD PRESSURE: 72 MMHG | OXYGEN SATURATION: 96 % | WEIGHT: 150 LBS | HEART RATE: 75 BPM | BODY MASS INDEX: 25.61 KG/M2 | RESPIRATION RATE: 18 BRPM | SYSTOLIC BLOOD PRESSURE: 138 MMHG

## 2022-07-12 DIAGNOSIS — M54.9 CHRONIC BACK PAIN, UNSPECIFIED BACK LOCATION, UNSPECIFIED BACK PAIN LATERALITY: ICD-10-CM

## 2022-07-12 DIAGNOSIS — G89.29 CHRONIC BACK PAIN, UNSPECIFIED BACK LOCATION, UNSPECIFIED BACK PAIN LATERALITY: ICD-10-CM

## 2022-07-12 DIAGNOSIS — I10 ESSENTIAL HYPERTENSION: Primary | ICD-10-CM

## 2022-07-12 DIAGNOSIS — Z11.1 SCREENING EXAMINATION FOR PULMONARY TUBERCULOSIS: ICD-10-CM

## 2022-07-12 DIAGNOSIS — K21.9 GASTROESOPHAGEAL REFLUX DISEASE, UNSPECIFIED WHETHER ESOPHAGITIS PRESENT: ICD-10-CM

## 2022-07-12 PROCEDURE — 86580 TB INTRADERMAL TEST: CPT | Mod: RHCUB | Performed by: NURSE PRACTITIONER

## 2022-07-12 PROCEDURE — 99213 PR OFFICE/OUTPT VISIT, EST, LEVL III, 20-29 MIN: ICD-10-PCS | Mod: ,,, | Performed by: NURSE PRACTITIONER

## 2022-07-12 PROCEDURE — 99213 OFFICE O/P EST LOW 20 MIN: CPT | Mod: ,,, | Performed by: NURSE PRACTITIONER

## 2022-07-12 RX ORDER — PANTOPRAZOLE SODIUM 40 MG/1
40 TABLET, DELAYED RELEASE ORAL DAILY
Qty: 90 TABLET | Refills: 3 | Status: SHIPPED | OUTPATIENT
Start: 2022-07-12 | End: 2022-08-26 | Stop reason: SDUPTHER

## 2022-07-12 NOTE — PROGRESS NOTES
Subjective:       Patient ID: Purnima Cardona is a 88 y.o. female.    Chief Complaint: Paperwork for Assited Living    Ms. Cardona presents in follow up, she is accompanied by daughter and states she is moving from an assisted living facility to [facility], paperwork for completion and needs TB skin test. PMH includes HTN, hyperlipidemia, GERD - she is followed by Cardiology for hypertension. She is currently ambulating with assistance of rollator walker. She denies complaints other than arthralgias from arthritis. She treats this with OTC topical ointment.    Review of Systems   Constitutional: Negative for activity change and unexpected weight change.   HENT: Negative for hearing loss, rhinorrhea and trouble swallowing.    Eyes: Negative for discharge and visual disturbance.   Respiratory: Negative for chest tightness and wheezing.    Cardiovascular: Negative for chest pain and palpitations.   Gastrointestinal: Negative for blood in stool, constipation, diarrhea and vomiting.   Endocrine: Negative for polydipsia and polyuria.   Genitourinary: Negative for difficulty urinating, dysuria, hematuria and menstrual problem.   Musculoskeletal: Negative for arthralgias, joint swelling and neck pain.   Neurological: Negative for weakness and headaches.   Psychiatric/Behavioral: Negative for confusion and dysphoric mood.         Objective:      Physical Exam  Vitals and nursing note reviewed.   Constitutional:       Appearance: Normal appearance.   HENT:      Head: Normocephalic.   Eyes:      Pupils: Pupils are equal, round, and reactive to light.   Cardiovascular:      Rate and Rhythm: Normal rate and regular rhythm.      Pulses: Normal pulses.      Heart sounds: Normal heart sounds.   Pulmonary:      Effort: Pulmonary effort is normal.      Breath sounds: Normal breath sounds.   Abdominal:      General: Bowel sounds are normal.      Palpations: Abdomen is soft.   Musculoskeletal:         General: Normal range of motion.       Cervical back: Normal range of motion.      Comments: Ambulates with assist of walker   Skin:     General: Skin is warm and dry.   Neurological:      General: No focal deficit present.      Mental Status: She is alert. Mental status is at baseline.   Psychiatric:         Behavior: Behavior normal.         Assessment:       Problem List Items Addressed This Visit        Cardiac/Vascular    Essential hypertension - Primary       Orthopedic    Back pain      Other Visit Diagnoses     Screening examination for pulmonary tuberculosis        Relevant Medications    tuberculin injection 5 Units    Other Relevant Orders    POCT TB Skin Test Read (Completed)    Gastroesophageal reflux disease, unspecified whether esophagitis present              Plan:        Blood pressure is well controlled, the patient is accompanied by her daugher who is her power of . She wishes for full aggressive treatment in the event of illness and we have discussed this today as well as completed paperwork for long term care facility. TB skin test administered, the patient denies s/s of TB. She remains active and reports a good appetite.

## 2022-07-14 LAB
TB INDURATION - 48 HR READ: NORMAL
TB INDURATION - 72 HR READ: NORMAL
TB SKIN TEST - 48 HR READ: NORMAL
TB SKIN TEST - 72 HR READ: NORMAL

## 2022-07-18 ENCOUNTER — HOSPITAL ENCOUNTER (OUTPATIENT)
Dept: RADIOLOGY | Facility: HOSPITAL | Age: 87
Discharge: HOME | End: 2022-07-18
Attending: NURSE PRACTITIONER
Payer: MEDICARE

## 2022-07-18 DIAGNOSIS — Z02.2 ENCOUNTER FOR EXAMINATION FOR ADMISSION TO ASSISTED LIVING FACILITY: ICD-10-CM

## 2022-07-18 DIAGNOSIS — Z02.2 ENCOUNTER FOR EXAMINATION FOR ADMISSION TO ASSISTED LIVING FACILITY: Primary | ICD-10-CM

## 2022-07-18 PROCEDURE — 71046 X-RAY EXAM CHEST 2 VIEWS: CPT | Mod: 26,,, | Performed by: RADIOLOGY

## 2022-07-18 PROCEDURE — 71046 XR CHEST PA AND LATERAL: ICD-10-PCS | Mod: 26,,, | Performed by: RADIOLOGY

## 2022-07-18 PROCEDURE — 71046 X-RAY EXAM CHEST 2 VIEWS: CPT | Mod: TC

## 2022-07-20 ENCOUNTER — CLINICAL SUPPORT (OUTPATIENT)
Dept: FAMILY MEDICINE | Facility: CLINIC | Age: 87
End: 2022-07-20
Payer: MEDICARE

## 2022-07-20 DIAGNOSIS — Z11.1 SCREENING EXAMINATION FOR PULMONARY TUBERCULOSIS: Primary | ICD-10-CM

## 2022-07-20 PROCEDURE — 86580 TB INTRADERMAL TEST: CPT | Mod: RHCUB | Performed by: NURSE PRACTITIONER

## 2022-07-22 ENCOUNTER — CLINICAL SUPPORT (OUTPATIENT)
Dept: FAMILY MEDICINE | Facility: CLINIC | Age: 87
End: 2022-07-22
Payer: MEDICARE

## 2022-07-27 ENCOUNTER — OFFICE VISIT (OUTPATIENT)
Dept: CARDIOLOGY | Facility: CLINIC | Age: 87
End: 2022-07-27
Payer: MEDICARE

## 2022-07-27 VITALS
HEIGHT: 64 IN | SYSTOLIC BLOOD PRESSURE: 148 MMHG | BODY MASS INDEX: 25.61 KG/M2 | DIASTOLIC BLOOD PRESSURE: 84 MMHG | WEIGHT: 150 LBS | HEART RATE: 98 BPM | OXYGEN SATURATION: 97 %

## 2022-07-27 DIAGNOSIS — I10 ESSENTIAL HYPERTENSION: Primary | ICD-10-CM

## 2022-07-27 DIAGNOSIS — R60.0 LOWER EXTREMITY EDEMA: ICD-10-CM

## 2022-07-27 PROCEDURE — 99214 OFFICE O/P EST MOD 30 MIN: CPT | Mod: PBBFAC | Performed by: INTERNAL MEDICINE

## 2022-07-27 PROCEDURE — 99214 PR OFFICE/OUTPT VISIT, EST, LEVL IV, 30-39 MIN: ICD-10-PCS | Mod: S$PBB,,, | Performed by: INTERNAL MEDICINE

## 2022-07-27 PROCEDURE — 93010 ELECTROCARDIOGRAM REPORT: CPT | Mod: S$PBB,,, | Performed by: INTERNAL MEDICINE

## 2022-07-27 PROCEDURE — 93010 EKG 12-LEAD: ICD-10-PCS | Mod: S$PBB,,, | Performed by: INTERNAL MEDICINE

## 2022-07-27 PROCEDURE — 93005 ELECTROCARDIOGRAM TRACING: CPT | Mod: PBBFAC | Performed by: INTERNAL MEDICINE

## 2022-07-27 PROCEDURE — 99214 OFFICE O/P EST MOD 30 MIN: CPT | Mod: S$PBB,,, | Performed by: INTERNAL MEDICINE

## 2022-07-27 RX ORDER — METOLAZONE 2.5 MG/1
5 TABLET ORAL DAILY
Qty: 60 TABLET | Refills: 0 | Status: SHIPPED | OUTPATIENT
Start: 2022-07-27 | End: 2022-10-20

## 2022-07-27 NOTE — PATIENT INSTRUCTIONS
Start Zaroxolyn for 3 days.   Pt to double check that she is on low salt diet at [facility].  Call Monday if edema is not resolved.   Recommend she sleep with legs elevated, adjustable bed

## 2022-07-27 NOTE — PROGRESS NOTES
Cardiology Clinic Note:    PCP: SUMAN Morales    REFERRING PHYSICIAN: SUMAN Morales    CHIEF COMPLAINT: No chief complaint on file.       HISTORY OF PRESENT ILLNESS:  Purnima Cardona is a 88 y.o. female who presents for evaluation of hypertension.  Daughter presents with pt.     Patient states she is having lower extremity edema for the past 1.5 weeks. She is on Lasix. Has moved to [facility] and not sure she is complaint with low salt diet. She doesn't have hospital bed and hasn't been elevating legs at night.  Denies chest pain or shortness of breath.  Patient states she is walking around with walker.  Pt reports blood pressure has been elevated.       Review of Systems   Constitutional: Negative for diaphoresis, malaise/fatigue, night sweats and weight gain.   HENT: Negative for congestion, ear pain, hearing loss, nosebleeds and sore throat.    Eyes: Negative for blurred vision, double vision, pain, photophobia and visual disturbance.   Cardiovascular: Positive for leg swelling. Negative for chest pain, claudication, dyspnea on exertion, irregular heartbeat, near-syncope, orthopnea, palpitations and syncope.   Respiratory: Negative for cough, shortness of breath, sleep disturbances due to breathing, snoring and wheezing.    Endocrine: Negative for cold intolerance, heat intolerance, polydipsia, polyphagia and polyuria.   Hematologic/Lymphatic: Negative for bleeding problem. Does not bruise/bleed easily.   Skin: Negative for dry skin, flushing, itching, rash and skin cancer.   Musculoskeletal: Negative for arthritis, back pain, falls, joint pain, muscle cramps, muscle weakness and myalgias.   Gastrointestinal: Negative for abdominal pain, change in bowel habit, constipation, diarrhea, dysphagia, heartburn, nausea and vomiting.   Genitourinary: Negative for bladder incontinence, dysuria, flank pain, frequency and nocturia.   Neurological: Negative for dizziness, focal weakness, headaches,  light-headedness, loss of balance, numbness, paresthesias and seizures.   Psychiatric/Behavioral: Negative for depression, memory loss and substance abuse. The patient is not nervous/anxious.    Allergic/Immunologic: Negative for environmental allergies.          PAST MEDICAL HISTORY:  Past Medical History:   Diagnosis Date    Abnormal EKG     Essential hypertension     Hyperlipidemia     Shortness of breath        PAST SURGICAL HISTORY:  History reviewed. No pertinent surgical history.    SOCIAL HISTORY:  Social History     Socioeconomic History    Marital status:    Tobacco Use    Smoking status: Never Smoker    Smokeless tobacco: Never Used   Substance and Sexual Activity    Alcohol use: Never    Drug use: Never    Sexual activity: Not Currently       FAMILY HISTORY:  Family History   Problem Relation Age of Onset    Breast cancer Mother     Stroke Father        ALLERGIES:  Allergies as of 07/27/2022 - Reviewed 07/12/2022   Allergen Reaction Noted    Clonidine  03/19/2021    Hiprex [methenamine hippurate]  03/19/2021    Sulfa (sulfonamide antibiotics)  03/19/2021         MEDICATIONS:  Current Outpatient Medications on File Prior to Visit   Medication Sig Dispense Refill    amLODIPine (NORVASC) 5 MG tablet Take 5 mg by mouth once daily.      aspirin (ECOTRIN) 81 MG EC tablet Take 81 mg by mouth once daily.      calcium carbonate (OS-NASREEN) 600 mg calcium (1,500 mg) Tab Take 600 mg by mouth 2 (two) times a day.      furosemide (LASIX) 20 MG tablet Take 20 mg by mouth once daily.      lisinopriL (PRINIVIL,ZESTRIL) 40 MG tablet Take 1 tablet (40 mg total) by mouth 2 (two) times daily. (Patient taking differently: Take 40 mg by mouth once daily.) 180 tablet 3    pantoprazole (PROTONIX) 40 MG tablet Take 1 tablet (40 mg total) by mouth once daily. 90 tablet 3    potassium 99 mg Tab Take 1 tablet by mouth once daily.       Current Facility-Administered Medications on File Prior to Visit  "  Medication Dose Route Frequency Provider Last Rate Last Admin    tuberculin injection 5 Units  5 Units Intradermal 1 time in Clinic/HOD SUMAN Morales              PHYSICAL EXAM:  Blood pressure (!) 148/84, pulse 98, height 5' 4" (1.626 m), weight 68 kg (150 lb), SpO2 97 %.  Wt Readings from Last 3 Encounters:   07/27/22 68 kg (150 lb)   07/12/22 68 kg (150 lb)   07/09/22 60 kg (132 lb 4.4 oz)      Body mass index is 25.75 kg/m².    Physical Exam  Vitals and nursing note reviewed.   Constitutional:       Appearance: Normal appearance. She is normal weight.   HENT:      Head: Normocephalic and atraumatic.      Right Ear: External ear normal.      Left Ear: External ear normal.   Eyes:      General: No scleral icterus.        Right eye: No discharge.         Left eye: No discharge.      Extraocular Movements: Extraocular movements intact.      Conjunctiva/sclera: Conjunctivae normal.      Pupils: Pupils are equal, round, and reactive to light.   Cardiovascular:      Rate and Rhythm: Normal rate and regular rhythm.      Pulses: Normal pulses.      Heart sounds: Normal heart sounds. No murmur heard.    No friction rub. No gallop.   Pulmonary:      Effort: Pulmonary effort is normal.      Breath sounds: Normal breath sounds. No wheezing, rhonchi or rales.   Chest:      Chest wall: No tenderness.   Abdominal:      General: Abdomen is flat. Bowel sounds are normal. There is no distension.      Palpations: Abdomen is soft.      Tenderness: There is no abdominal tenderness. There is no guarding or rebound.   Musculoskeletal:         General: No swelling or tenderness. Normal range of motion.      Cervical back: Normal range of motion and neck supple.      Right lower leg: Edema present.      Left lower leg: Edema present.   Skin:     General: Skin is warm and dry.      Findings: No erythema or rash.   Neurological:      General: No focal deficit present.      Mental Status: She is alert and oriented to person, " place, and time.      Cranial Nerves: No cranial nerve deficit.      Motor: No weakness.      Gait: Gait normal.   Psychiatric:         Mood and Affect: Mood normal.         Behavior: Behavior normal.         Thought Content: Thought content normal.         Judgment: Judgment normal.          LABS REVIEWED:  Lab Results   Component Value Date    WBC 9.82 02/09/2022    RBC 4.51 02/09/2022    HGB 12.7 02/09/2022    HCT 39.2 02/09/2022    MCV 86.9 02/09/2022    MCH 28.2 02/09/2022    MCHC 32.4 02/09/2022    RDW 15.7 (H) 02/09/2022     02/09/2022    MPV 10.6 02/09/2022    NRBC 0.0 02/09/2022    INR 0.96 10/17/2021     Lab Results   Component Value Date     (L) 02/09/2022    K 3.6 02/09/2022     02/09/2022    CO2 30 02/09/2022    BUN 23 (H) 02/09/2022    MG 2.2 02/09/2022     Lab Results   Component Value Date    AST 24 02/09/2022    ALT 20 02/09/2022     Lab Results   Component Value Date     (H) 02/09/2022     No results found for: CHOL, HDL, LDL, TRIG, CHOLHDL    CARDIAC STUDIES REVIEWED:  EKG 7/27/22 -  Sinus rhythm with premature supraventricular complees.                            Septal infarct, age undetermined            2/9/22 -  Sinus tachycardia  with PACs.  Indeterminate axis                          Anteroseptal infarct - age undetermined    OTHER IMAGING STUDIES REVIEWED:    ASSESSMENT:   Essential hypertension  -     EKG 12-lead; Future      PLAN:  1.  Lower extremity edema, unclear etiology, will  start Zaroxolyn for 3 days.  Double check pt is on low salt diet. Call Monday it not resolved.        Recommend she sleep with legs elevated, adjustable bed

## 2022-08-26 DIAGNOSIS — R60.0 LOWER EXTREMITY EDEMA: ICD-10-CM

## 2022-08-26 DIAGNOSIS — I10 HYPERTENSION, UNSPECIFIED TYPE: ICD-10-CM

## 2022-08-26 RX ORDER — PANTOPRAZOLE SODIUM 40 MG/1
40 TABLET, DELAYED RELEASE ORAL DAILY
Qty: 90 TABLET | Refills: 3 | Status: SHIPPED | OUTPATIENT
Start: 2022-08-26 | End: 2023-04-27 | Stop reason: SDUPTHER

## 2022-08-26 RX ORDER — LISINOPRIL 40 MG/1
40 TABLET ORAL DAILY
Qty: 90 TABLET | Refills: 1 | Status: SHIPPED | OUTPATIENT
Start: 2022-08-26 | End: 2023-04-28 | Stop reason: SDUPTHER

## 2022-08-29 RX ORDER — FUROSEMIDE 20 MG/1
20 TABLET ORAL DAILY
Qty: 30 TABLET | Refills: 5 | Status: SHIPPED | OUTPATIENT
Start: 2022-08-29 | End: 2022-10-20

## 2022-08-29 RX ORDER — AMLODIPINE BESYLATE 5 MG/1
5 TABLET ORAL DAILY
Qty: 30 TABLET | Refills: 5 | Status: SHIPPED | OUTPATIENT
Start: 2022-08-29 | End: 2022-10-31 | Stop reason: ALTCHOICE

## 2022-09-30 ENCOUNTER — EXTERNAL CHRONIC CARE MANAGEMENT (OUTPATIENT)
Dept: FAMILY MEDICINE | Facility: CLINIC | Age: 87
End: 2022-09-30
Payer: MEDICARE

## 2022-09-30 PROCEDURE — G0511 CCM/BHI BY RHC/FQHC 20MIN MO: HCPCS | Mod: ,,, | Performed by: NURSE PRACTITIONER

## 2022-09-30 PROCEDURE — G0511 PR CHRONIC CARE MGMT, RHC OR FQHC ONLY, 20 MINS OR MORE: ICD-10-PCS | Mod: ,,, | Performed by: NURSE PRACTITIONER

## 2022-10-20 RX ORDER — METOLAZONE 2.5 MG/1
5 TABLET ORAL DAILY
Qty: 8 TABLET | Refills: 0 | Status: SHIPPED | OUTPATIENT
Start: 2022-10-20 | End: 2023-04-05 | Stop reason: SDUPTHER

## 2022-10-27 ENCOUNTER — OFFICE VISIT (OUTPATIENT)
Dept: FAMILY MEDICINE | Facility: CLINIC | Age: 87
End: 2022-10-27
Payer: MEDICARE

## 2022-10-27 VITALS
DIASTOLIC BLOOD PRESSURE: 72 MMHG | HEIGHT: 64 IN | TEMPERATURE: 98 F | WEIGHT: 144 LBS | RESPIRATION RATE: 18 BRPM | BODY MASS INDEX: 24.59 KG/M2 | HEART RATE: 101 BPM | OXYGEN SATURATION: 98 % | SYSTOLIC BLOOD PRESSURE: 138 MMHG

## 2022-10-27 DIAGNOSIS — M13.0 POLYARTHRITIS: ICD-10-CM

## 2022-10-27 DIAGNOSIS — I10 ESSENTIAL HYPERTENSION: Primary | ICD-10-CM

## 2022-10-27 DIAGNOSIS — R73.9 HYPERGLYCEMIA: ICD-10-CM

## 2022-10-27 DIAGNOSIS — G89.29 CHRONIC BACK PAIN, UNSPECIFIED BACK LOCATION, UNSPECIFIED BACK PAIN LATERALITY: ICD-10-CM

## 2022-10-27 DIAGNOSIS — Z91.81 AT RISK FOR FALLING: ICD-10-CM

## 2022-10-27 DIAGNOSIS — R26.81 UNSTEADY GAIT: ICD-10-CM

## 2022-10-27 DIAGNOSIS — R60.0 LOWER EXTREMITY EDEMA: ICD-10-CM

## 2022-10-27 DIAGNOSIS — M54.9 CHRONIC BACK PAIN, UNSPECIFIED BACK LOCATION, UNSPECIFIED BACK PAIN LATERALITY: ICD-10-CM

## 2022-10-27 DIAGNOSIS — M17.0 PRIMARY OSTEOARTHRITIS OF BOTH KNEES: ICD-10-CM

## 2022-10-27 LAB
BASOPHILS # BLD AUTO: 0.04 K/UL (ref 0–0.2)
BASOPHILS NFR BLD AUTO: 0.5 % (ref 0–1)
DIFFERENTIAL METHOD BLD: ABNORMAL
EOSINOPHIL # BLD AUTO: 0.08 K/UL (ref 0–0.5)
EOSINOPHIL NFR BLD AUTO: 1 % (ref 1–4)
ERYTHROCYTE [DISTWIDTH] IN BLOOD BY AUTOMATED COUNT: 13.9 % (ref 11.5–14.5)
HCT VFR BLD AUTO: 40.8 % (ref 38–47)
HGB BLD-MCNC: 13.2 G/DL (ref 12–16)
IMM GRANULOCYTES # BLD AUTO: 0.02 K/UL (ref 0–0.04)
IMM GRANULOCYTES NFR BLD: 0.2 % (ref 0–0.4)
LYMPHOCYTES # BLD AUTO: 3.06 K/UL (ref 1–4.8)
LYMPHOCYTES NFR BLD AUTO: 36.3 % (ref 27–41)
MCH RBC QN AUTO: 29.9 PG (ref 27–31)
MCHC RBC AUTO-ENTMCNC: 32.4 G/DL (ref 32–36)
MCV RBC AUTO: 92.5 FL (ref 80–96)
MONOCYTES # BLD AUTO: 0.65 K/UL (ref 0–0.8)
MONOCYTES NFR BLD AUTO: 7.7 % (ref 2–6)
MPC BLD CALC-MCNC: 10.3 FL (ref 9.4–12.4)
NEUTROPHILS # BLD AUTO: 4.57 K/UL (ref 1.8–7.7)
NEUTROPHILS NFR BLD AUTO: 54.3 % (ref 53–65)
NRBC # BLD AUTO: 0 X10E3/UL
NRBC, AUTO (.00): 0 %
PLATELET # BLD AUTO: 288 K/UL (ref 150–400)
RBC # BLD AUTO: 4.41 M/UL (ref 4.2–5.4)
WBC # BLD AUTO: 8.42 K/UL (ref 4.5–11)

## 2022-10-27 PROCEDURE — 85025 CBC WITH DIFFERENTIAL: ICD-10-PCS | Mod: ,,, | Performed by: CLINICAL MEDICAL LABORATORY

## 2022-10-27 PROCEDURE — 36415 PR COLLECTION VENOUS BLOOD,VENIPUNCTURE: ICD-10-PCS | Mod: ,,, | Performed by: CLINICAL MEDICAL LABORATORY

## 2022-10-27 PROCEDURE — 85025 COMPLETE CBC W/AUTO DIFF WBC: CPT | Mod: ,,, | Performed by: CLINICAL MEDICAL LABORATORY

## 2022-10-27 PROCEDURE — 83735 MAGNESIUM: ICD-10-PCS | Mod: ,,, | Performed by: CLINICAL MEDICAL LABORATORY

## 2022-10-27 PROCEDURE — 99213 PR OFFICE/OUTPT VISIT, EST, LEVL III, 20-29 MIN: ICD-10-PCS | Mod: ,,, | Performed by: NURSE PRACTITIONER

## 2022-10-27 PROCEDURE — 83036 HEMOGLOBIN A1C: ICD-10-PCS | Mod: ,,, | Performed by: CLINICAL MEDICAL LABORATORY

## 2022-10-27 PROCEDURE — 36415 COLL VENOUS BLD VENIPUNCTURE: CPT | Mod: ,,, | Performed by: CLINICAL MEDICAL LABORATORY

## 2022-10-27 PROCEDURE — 80053 COMPREHEN METABOLIC PANEL: CPT | Mod: ,,, | Performed by: CLINICAL MEDICAL LABORATORY

## 2022-10-27 PROCEDURE — 99213 OFFICE O/P EST LOW 20 MIN: CPT | Mod: ,,, | Performed by: NURSE PRACTITIONER

## 2022-10-27 PROCEDURE — 83036 HEMOGLOBIN GLYCOSYLATED A1C: CPT | Mod: ,,, | Performed by: CLINICAL MEDICAL LABORATORY

## 2022-10-27 PROCEDURE — 80053 COMPREHENSIVE METABOLIC PANEL: ICD-10-PCS | Mod: ,,, | Performed by: CLINICAL MEDICAL LABORATORY

## 2022-10-27 PROCEDURE — 83735 ASSAY OF MAGNESIUM: CPT | Mod: ,,, | Performed by: CLINICAL MEDICAL LABORATORY

## 2022-10-27 RX ORDER — FUROSEMIDE 20 MG/1
20 TABLET ORAL DAILY
COMMUNITY
End: 2023-01-11 | Stop reason: SDUPTHER

## 2022-10-27 NOTE — PROGRESS NOTES
Subjective:       Patient ID: Purnima Cardona is a 88 y.o. female.    Chief Complaint: Follow-up (Daughter states would like to get physical therapy for pt with assisted living, currently at [facility], states her ankles are starting to swell and that pt has responded well to physical therapy in the past)    Ms. Cardona presents to clinic in follow up for hypertension and lower extremity swelling (feet). She ambulates with a rollator walker, states she has worked her way back to using a walker from a wheelchair, she has severe osteoarthritis in bilateral knees and is requesting physical therapy.     Review of Systems   Constitutional:  Negative for activity change and unexpected weight change.   HENT:  Negative for hearing loss, rhinorrhea and trouble swallowing.    Eyes:  Negative for discharge and visual disturbance.   Respiratory:  Negative for chest tightness and wheezing.    Cardiovascular:  Negative for palpitations.   Gastrointestinal:  Negative for blood in stool, constipation and diarrhea.   Endocrine: Negative for polydipsia and polyuria.   Genitourinary:  Negative for difficulty urinating, dysuria, hematuria and menstrual problem.   Musculoskeletal:  Positive for arthralgias (wyatt knees), gait problem and leg pain.   Psychiatric/Behavioral:  Negative for confusion and dysphoric mood.        Objective:      Physical Exam  Vitals and nursing note reviewed.   Constitutional:       Appearance: Normal appearance.   HENT:      Head: Normocephalic.   Cardiovascular:      Rate and Rhythm: Normal rate and regular rhythm.      Pulses: Normal pulses.      Heart sounds: Normal heart sounds.   Pulmonary:      Effort: Pulmonary effort is normal.      Breath sounds: Normal breath sounds.   Abdominal:      General: Bowel sounds are normal.      Palpations: Abdomen is soft.   Musculoskeletal:         General: Tenderness (wyatt knees) present.   Skin:     General: Skin is warm and dry.   Neurological:      Mental Status: She is  alert and oriented to person, place, and time.   Psychiatric:         Behavior: Behavior normal.       Assessment:       Problem List Items Addressed This Visit          Cardiac/Vascular    Essential hypertension - Primary    Relevant Orders    Comprehensive Metabolic Panel    CBC Auto Differential    Magnesium       Orthopedic    Back pain       Other    Lower extremity edema     Other Visit Diagnoses       Polyarthritis        At risk for falling        Primary osteoarthritis of both knees        Unsteady gait                Plan:        Physical therapy through Home Health at the [facility], fall risk, using walker for ambulation, wyatt knee pain and low back pain, unsteady gait and strengthening.   Nonfasting labs today, we will notify you of results.

## 2022-10-28 LAB
ALBUMIN SERPL BCP-MCNC: 4 G/DL (ref 3.5–5)
ALBUMIN/GLOB SERPL: 1.1 {RATIO}
ALP SERPL-CCNC: 90 U/L (ref 55–142)
ALT SERPL W P-5'-P-CCNC: 24 U/L (ref 13–56)
ANION GAP SERPL CALCULATED.3IONS-SCNC: 14 MMOL/L (ref 7–16)
AST SERPL W P-5'-P-CCNC: 15 U/L (ref 15–37)
BILIRUB SERPL-MCNC: 0.2 MG/DL (ref ?–1.2)
BUN SERPL-MCNC: 44 MG/DL (ref 7–18)
BUN/CREAT SERPL: 32 (ref 6–20)
CALCIUM SERPL-MCNC: 9.7 MG/DL (ref 8.5–10.1)
CHLORIDE SERPL-SCNC: 96 MMOL/L (ref 98–107)
CO2 SERPL-SCNC: 29 MMOL/L (ref 21–32)
CREAT SERPL-MCNC: 1.36 MG/DL (ref 0.55–1.02)
EGFR (NO RACE VARIABLE) (RUSH/TITUS): 38 ML/MIN/1.73M²
GLOBULIN SER-MCNC: 3.6 G/DL (ref 2–4)
GLUCOSE SERPL-MCNC: 188 MG/DL (ref 74–106)
MAGNESIUM SERPL-MCNC: 2.5 MG/DL (ref 1.7–2.3)
POTASSIUM SERPL-SCNC: 4 MMOL/L (ref 3.5–5.1)
PROT SERPL-MCNC: 7.6 G/DL (ref 6.4–8.2)
SODIUM SERPL-SCNC: 135 MMOL/L (ref 136–145)

## 2022-10-31 ENCOUNTER — OFFICE VISIT (OUTPATIENT)
Dept: CARDIOLOGY | Facility: CLINIC | Age: 87
End: 2022-10-31
Payer: MEDICARE

## 2022-10-31 ENCOUNTER — PATIENT MESSAGE (OUTPATIENT)
Dept: FAMILY MEDICINE | Facility: CLINIC | Age: 87
End: 2022-10-31
Payer: MEDICARE

## 2022-10-31 ENCOUNTER — EXTERNAL CHRONIC CARE MANAGEMENT (OUTPATIENT)
Dept: FAMILY MEDICINE | Facility: CLINIC | Age: 87
End: 2022-10-31
Payer: MEDICARE

## 2022-10-31 VITALS
BODY MASS INDEX: 24.41 KG/M2 | DIASTOLIC BLOOD PRESSURE: 72 MMHG | HEIGHT: 64 IN | HEART RATE: 103 BPM | SYSTOLIC BLOOD PRESSURE: 138 MMHG | WEIGHT: 143 LBS | OXYGEN SATURATION: 96 %

## 2022-10-31 DIAGNOSIS — I10 ESSENTIAL HYPERTENSION: Primary | ICD-10-CM

## 2022-10-31 DIAGNOSIS — R60.0 LOWER EXTREMITY EDEMA: ICD-10-CM

## 2022-10-31 DIAGNOSIS — R42 ORTHOSTATIC DIZZINESS: ICD-10-CM

## 2022-10-31 LAB
EST. AVERAGE GLUCOSE BLD GHB EST-MCNC: 134 MG/DL
HBA1C MFR BLD HPLC: 6.6 % (ref 4.5–6.6)

## 2022-10-31 PROCEDURE — G0511 CCM/BHI BY RHC/FQHC 20MIN MO: HCPCS | Mod: ,,, | Performed by: NURSE PRACTITIONER

## 2022-10-31 PROCEDURE — 99214 PR OFFICE/OUTPT VISIT, EST, LEVL IV, 30-39 MIN: ICD-10-PCS | Mod: S$PBB,,, | Performed by: INTERNAL MEDICINE

## 2022-10-31 PROCEDURE — 99214 OFFICE O/P EST MOD 30 MIN: CPT | Mod: PBBFAC | Performed by: INTERNAL MEDICINE

## 2022-10-31 PROCEDURE — G0511 PR CHRONIC CARE MGMT, RHC OR FQHC ONLY, 20 MINS OR MORE: ICD-10-PCS | Mod: ,,, | Performed by: NURSE PRACTITIONER

## 2022-10-31 PROCEDURE — 99214 OFFICE O/P EST MOD 30 MIN: CPT | Mod: S$PBB,,, | Performed by: INTERNAL MEDICINE

## 2022-10-31 RX ORDER — CALCIUM CARBONATE 600 MG
600 TABLET ORAL DAILY
COMMUNITY

## 2022-11-01 ENCOUNTER — TELEPHONE (OUTPATIENT)
Dept: FAMILY MEDICINE | Facility: CLINIC | Age: 87
End: 2022-11-01
Payer: MEDICARE

## 2022-11-01 NOTE — TELEPHONE ENCOUNTER
Attempted calling pt to notify of results, no answer. Left voicemail      ----- Message from SUMAN Morales sent at 10/31/2022  4:19 PM CDT -----  Glucose elevated, checked a1C and it is 6.6 which falls in diabetic range. Recommend a follow up appointment to discuss diet and possibly medications. Kidney function is elevated, suspect dehydration, would recommend repeating this in a month.

## 2022-11-01 NOTE — PROGRESS NOTES
Cardiology Clinic Note:    PCP: SUMAN Morales    REFERRING PHYSICIAN: SUMAN Morales    CHIEF COMPLAINT:   Chief Complaint   Patient presents with    numbness in bilateral lower extremities    Shortness of Breath    edema in bilateral lower extremities        HISTORY OF PRESENT ILLNESS:  Purnima Cardona is a 88 y.o. female who presents for evaluation of hypertension.  Daughter presents with pt.     Pt reports has been more active, moved to assisted living, has activities and safe place to ambulate with walker.  She reports chest pain has resolved.  She  is monitoring blood pressure has been  running a little low.  She also complains of mild bilateral peripheral edema, worsens throughout the day, improves overnight. . Patient states she is walking around with walker.    Patient denies dizziness.  Denies chest pain, pressure, tightness or squeezing.      Review of Systems   Constitutional: Negative for diaphoresis, malaise/fatigue, night sweats and weight gain.   HENT:  Negative for congestion, ear pain, hearing loss, nosebleeds and sore throat.    Eyes:  Negative for blurred vision, double vision, pain, photophobia and visual disturbance.   Cardiovascular:  Negative for chest pain, claudication, dyspnea on exertion, irregular heartbeat, leg swelling, near-syncope, orthopnea, palpitations and syncope.   Respiratory:  Negative for cough, shortness of breath, sleep disturbances due to breathing, snoring and wheezing.    Endocrine: Negative for cold intolerance, heat intolerance, polydipsia, polyphagia and polyuria.   Hematologic/Lymphatic: Negative for bleeding problem. Does not bruise/bleed easily.   Skin:  Negative for dry skin, flushing, itching, rash and skin cancer.   Musculoskeletal:  Negative for arthritis, back pain, falls, joint pain, muscle cramps, muscle weakness and myalgias.   Gastrointestinal:  Negative for abdominal pain, change in bowel habit, constipation, diarrhea, dysphagia,  heartburn, nausea and vomiting.   Genitourinary:  Negative for bladder incontinence, dysuria, flank pain, frequency and nocturia.   Neurological:  Negative for dizziness, focal weakness, headaches, light-headedness, loss of balance, numbness, paresthesias and seizures.   Psychiatric/Behavioral:  Negative for depression, memory loss and substance abuse. The patient is not nervous/anxious.    Allergic/Immunologic: Negative for environmental allergies.        PAST MEDICAL HISTORY:  Past Medical History:   Diagnosis Date    Abnormal EKG     Essential hypertension     Hyperlipidemia     Shortness of breath        PAST SURGICAL HISTORY:  History reviewed. No pertinent surgical history.    SOCIAL HISTORY:  Social History     Socioeconomic History    Marital status:    Tobacco Use    Smoking status: Never    Smokeless tobacco: Never   Substance and Sexual Activity    Alcohol use: Never    Drug use: Never    Sexual activity: Not Currently       FAMILY HISTORY:  Family History   Problem Relation Age of Onset    Breast cancer Mother     Stroke Father        ALLERGIES:  Allergies as of 10/31/2022 - Reviewed 10/31/2022   Allergen Reaction Noted    Clonidine  03/19/2021    Hiprex [methenamine hippurate]  03/19/2021    Sulfa (sulfonamide antibiotics)  03/19/2021         MEDICATIONS:  Current Outpatient Medications on File Prior to Visit   Medication Sig Dispense Refill    acetaminophen (TYLENOL EXTRA STRENGTH ORAL) Take by mouth.      aspirin (ECOTRIN) 81 MG EC tablet Take 81 mg by mouth once daily.      calcium carbonate (OS-NASREEN) 600 mg calcium (1,500 mg) Tab Take 600 mg by mouth once daily. Take 1 tablet po BID      furosemide (LASIX) 20 MG tablet Take 20 mg by mouth once daily.      lisinopriL (PRINIVIL,ZESTRIL) 40 MG tablet Take 1 tablet (40 mg total) by mouth once daily. 90 tablet 1    metOLazone (ZAROXOLYN) 2.5 MG tablet Take 2 tablets (5 mg total) by mouth once daily. 8 tablet 0    multivit-min/ferrous fumarate  "(MULTI VITAMIN ORAL) Take by mouth.      pantoprazole (PROTONIX) 40 MG tablet Take 1 tablet (40 mg total) by mouth once daily. 90 tablet 3    potassium 99 mg Tab Take 1 tablet by mouth once daily.      [DISCONTINUED] amLODIPine (NORVASC) 5 MG tablet Take 1 tablet (5 mg total) by mouth once daily. 30 tablet 5     No current facility-administered medications on file prior to visit.          PHYSICAL EXAM:  Blood pressure 138/72, pulse 103, height 5' 4" (1.626 m), weight 64.9 kg (143 lb), SpO2 96 %.  Wt Readings from Last 3 Encounters:   10/31/22 64.9 kg (143 lb)   10/27/22 65.3 kg (144 lb)   07/27/22 68 kg (150 lb)      Body mass index is 24.55 kg/m².    Physical Exam  Vitals and nursing note reviewed.   Constitutional:       Appearance: Normal appearance. She is normal weight.   HENT:      Head: Normocephalic and atraumatic.      Right Ear: External ear normal.      Left Ear: External ear normal.   Eyes:      General: No scleral icterus.        Right eye: No discharge.         Left eye: No discharge.      Extraocular Movements: Extraocular movements intact.      Conjunctiva/sclera: Conjunctivae normal.      Pupils: Pupils are equal, round, and reactive to light.   Cardiovascular:      Rate and Rhythm: Normal rate and regular rhythm.      Pulses: Normal pulses.      Heart sounds: Normal heart sounds. No murmur heard.    No friction rub. No gallop.   Pulmonary:      Effort: Pulmonary effort is normal.      Breath sounds: Normal breath sounds. No wheezing, rhonchi or rales.   Chest:      Chest wall: No tenderness.   Abdominal:      General: Abdomen is flat. Bowel sounds are normal. There is no distension.      Palpations: Abdomen is soft.      Tenderness: There is no abdominal tenderness. There is no guarding or rebound.   Musculoskeletal:         General: No swelling or tenderness. Normal range of motion.      Cervical back: Normal range of motion and neck supple.   Skin:     General: Skin is warm and dry.      " Findings: No erythema or rash.   Neurological:      General: No focal deficit present.      Mental Status: She is alert and oriented to person, place, and time.      Cranial Nerves: No cranial nerve deficit.      Motor: No weakness.      Gait: Gait normal.   Psychiatric:         Mood and Affect: Mood normal.         Behavior: Behavior normal.         Thought Content: Thought content normal.         Judgment: Judgment normal.        LABS REVIEWED:  Lab Results   Component Value Date    WBC 8.42 10/27/2022    RBC 4.41 10/27/2022    HGB 13.2 10/27/2022    HCT 40.8 10/27/2022    MCV 92.5 10/27/2022    MCH 29.9 10/27/2022    MCHC 32.4 10/27/2022    RDW 13.9 10/27/2022     10/27/2022    MPV 10.3 10/27/2022    NRBC 0.0 10/27/2022    INR 0.96 10/17/2021     Lab Results   Component Value Date     (L) 10/27/2022    K 4.0 10/27/2022    CL 96 (L) 10/27/2022    CO2 29 10/27/2022    BUN 44 (H) 10/27/2022    MG 2.5 (H) 10/27/2022     Lab Results   Component Value Date    AST 15 10/27/2022    ALT 24 10/27/2022     Lab Results   Component Value Date     (H) 10/27/2022    HGBA1C 6.6 10/27/2022     No results found for: CHOL, HDL, LDL, TRIG, CHOLHDL    CARDIAC STUDIES REVIEWED:    OTHER IMAGING STUDIES REVIEWED:        ASSESSMENT:   Essential hypertension        PLAN:  1.  Hypertension, may be too low, will DC amlodipine, take 2  20 mg lasix x 3 days, then go back to just one a day. Monitor bp over next week, notify if is greater than 160 systolic                     2.  Lower extremity edema,monitor on increased  Lasix

## 2022-11-30 ENCOUNTER — EXTERNAL CHRONIC CARE MANAGEMENT (OUTPATIENT)
Dept: FAMILY MEDICINE | Facility: CLINIC | Age: 87
End: 2022-11-30
Payer: MEDICARE

## 2022-11-30 PROCEDURE — G0511 PR CHRONIC CARE MGMT, RHC OR FQHC ONLY, 20 MINS OR MORE: ICD-10-PCS | Mod: ,,, | Performed by: NURSE PRACTITIONER

## 2022-11-30 PROCEDURE — G0511 CCM/BHI BY RHC/FQHC 20MIN MO: HCPCS | Mod: ,,, | Performed by: NURSE PRACTITIONER

## 2022-12-20 ENCOUNTER — OFFICE VISIT (OUTPATIENT)
Dept: CARDIOLOGY | Facility: CLINIC | Age: 87
End: 2022-12-20
Payer: MEDICARE

## 2022-12-20 VITALS
HEART RATE: 111 BPM | WEIGHT: 136 LBS | BODY MASS INDEX: 23.22 KG/M2 | DIASTOLIC BLOOD PRESSURE: 60 MMHG | OXYGEN SATURATION: 97 % | SYSTOLIC BLOOD PRESSURE: 146 MMHG | HEIGHT: 64 IN

## 2022-12-20 DIAGNOSIS — I10 ESSENTIAL HYPERTENSION: Primary | ICD-10-CM

## 2022-12-20 DIAGNOSIS — R60.0 LOWER EXTREMITY EDEMA: ICD-10-CM

## 2022-12-20 DIAGNOSIS — R42 ORTHOSTATIC DIZZINESS: ICD-10-CM

## 2022-12-20 PROCEDURE — 99214 OFFICE O/P EST MOD 30 MIN: CPT | Mod: PBBFAC | Performed by: INTERNAL MEDICINE

## 2022-12-20 PROCEDURE — 99213 OFFICE O/P EST LOW 20 MIN: CPT | Mod: S$PBB,,, | Performed by: INTERNAL MEDICINE

## 2022-12-20 PROCEDURE — 99213 PR OFFICE/OUTPT VISIT, EST, LEVL III, 20-29 MIN: ICD-10-PCS | Mod: S$PBB,,, | Performed by: INTERNAL MEDICINE

## 2022-12-27 NOTE — PROGRESS NOTES
Cardiology Clinic Note:    PCP: SUMAN Morales    REFERRING PHYSICIAN: SUMAN Morales    CHIEF COMPLAINT:   Chief Complaint   Patient presents with    Shortness of Breath     With exertion sometimes. States she thinks it is normal        HISTORY OF PRESENT ILLNESS:  Purnima Cardona is a 88 y.o. female who presents for evaluation of hypertension.      Pt reports has been more active, moved to assisted living. She reports chest pain has resolved.  She  is monitoring blood pressure has been well controlled.  She notes mild bilateral peripheral has improved, back to one Lasix daily. . Patient states she is walking around with walker.    Patient denies dizziness.  Denies chest pain, pressure, tightness or squeezing.      Review of Systems   Constitutional: Negative for diaphoresis, malaise/fatigue, night sweats and weight gain.   HENT:  Negative for congestion, ear pain, hearing loss, nosebleeds and sore throat.    Eyes:  Negative for blurred vision, double vision, pain, photophobia and visual disturbance.   Cardiovascular:  Negative for chest pain, claudication, dyspnea on exertion, irregular heartbeat, leg swelling, near-syncope, orthopnea, palpitations and syncope.   Respiratory:  Negative for cough, shortness of breath, sleep disturbances due to breathing, snoring and wheezing.    Endocrine: Negative for cold intolerance, heat intolerance, polydipsia, polyphagia and polyuria.   Hematologic/Lymphatic: Negative for bleeding problem. Does not bruise/bleed easily.   Skin:  Negative for dry skin, flushing, itching, rash and skin cancer.   Musculoskeletal:  Negative for arthritis, back pain, falls, joint pain, muscle cramps, muscle weakness and myalgias.   Gastrointestinal:  Negative for abdominal pain, change in bowel habit, constipation, diarrhea, dysphagia, heartburn, nausea and vomiting.   Genitourinary:  Negative for bladder incontinence, dysuria, flank pain, frequency and nocturia.   Neurological:   Negative for dizziness, focal weakness, headaches, light-headedness, loss of balance, numbness, paresthesias and seizures.   Psychiatric/Behavioral:  Negative for depression, memory loss and substance abuse. The patient is not nervous/anxious.    Allergic/Immunologic: Negative for environmental allergies.        PAST MEDICAL HISTORY:  Past Medical History:   Diagnosis Date    Abnormal EKG     Essential hypertension     Hyperlipidemia     Shortness of breath        PAST SURGICAL HISTORY:  History reviewed. No pertinent surgical history.    SOCIAL HISTORY:  Social History     Socioeconomic History    Marital status:    Tobacco Use    Smoking status: Never    Smokeless tobacco: Never   Substance and Sexual Activity    Alcohol use: Never    Drug use: Never    Sexual activity: Not Currently       FAMILY HISTORY:  Family History   Problem Relation Age of Onset    Breast cancer Mother     Stroke Father        ALLERGIES:  Allergies as of 12/20/2022 - Reviewed 12/20/2022   Allergen Reaction Noted    Clonidine  03/19/2021    Hiprex [methenamine hippurate]  03/19/2021    Sulfa (sulfonamide antibiotics)  03/19/2021         MEDICATIONS:  Current Outpatient Medications on File Prior to Visit   Medication Sig Dispense Refill    acetaminophen (TYLENOL EXTRA STRENGTH ORAL) Take by mouth.      aspirin (ECOTRIN) 81 MG EC tablet Take 81 mg by mouth once daily.      calcium carbonate (OS-NASREEN) 600 mg calcium (1,500 mg) Tab Take 600 mg by mouth once daily. Take 1 tablet po BID      furosemide (LASIX) 20 MG tablet Take 20 mg by mouth once daily.      lisinopriL (PRINIVIL,ZESTRIL) 40 MG tablet Take 1 tablet (40 mg total) by mouth once daily. 90 tablet 1    multivit-min/ferrous fumarate (MULTI VITAMIN ORAL) Take by mouth.      pantoprazole (PROTONIX) 40 MG tablet Take 1 tablet (40 mg total) by mouth once daily. 90 tablet 3    potassium 99 mg Tab Take 1 tablet by mouth once daily.      metOLazone (ZAROXOLYN) 2.5 MG tablet Take 2  "tablets (5 mg total) by mouth once daily. (Patient not taking: Reported on 12/20/2022) 8 tablet 0     No current facility-administered medications on file prior to visit.          PHYSICAL EXAM:  Blood pressure (!) 146/60, pulse (!) 111, height 5' 4" (1.626 m), weight 61.7 kg (136 lb), SpO2 97 %.  Wt Readings from Last 3 Encounters:   12/20/22 61.7 kg (136 lb)   10/31/22 64.9 kg (143 lb)   10/27/22 65.3 kg (144 lb)      Body mass index is 23.34 kg/m².    Physical Exam  Vitals and nursing note reviewed.   Constitutional:       Appearance: Normal appearance. She is normal weight.   HENT:      Head: Normocephalic and atraumatic.      Right Ear: External ear normal.      Left Ear: External ear normal.   Eyes:      General: No scleral icterus.        Right eye: No discharge.         Left eye: No discharge.      Extraocular Movements: Extraocular movements intact.      Conjunctiva/sclera: Conjunctivae normal.      Pupils: Pupils are equal, round, and reactive to light.   Cardiovascular:      Rate and Rhythm: Normal rate and regular rhythm.      Pulses: Normal pulses.      Heart sounds: Normal heart sounds. No murmur heard.    No friction rub. No gallop.   Pulmonary:      Effort: Pulmonary effort is normal.      Breath sounds: Normal breath sounds. No wheezing, rhonchi or rales.   Chest:      Chest wall: No tenderness.   Abdominal:      General: Abdomen is flat. Bowel sounds are normal. There is no distension.      Palpations: Abdomen is soft.      Tenderness: There is no abdominal tenderness. There is no guarding or rebound.   Musculoskeletal:         General: No swelling or tenderness. Normal range of motion.      Cervical back: Normal range of motion and neck supple.   Skin:     General: Skin is warm and dry.      Findings: No erythema or rash.   Neurological:      General: No focal deficit present.      Mental Status: She is alert and oriented to person, place, and time.      Cranial Nerves: No cranial nerve deficit. "      Motor: No weakness.      Gait: Gait normal.   Psychiatric:         Mood and Affect: Mood normal.         Behavior: Behavior normal.         Thought Content: Thought content normal.         Judgment: Judgment normal.        LABS REVIEWED:  Lab Results   Component Value Date    WBC 8.42 10/27/2022    RBC 4.41 10/27/2022    HGB 13.2 10/27/2022    HCT 40.8 10/27/2022    MCV 92.5 10/27/2022    MCH 29.9 10/27/2022    MCHC 32.4 10/27/2022    RDW 13.9 10/27/2022     10/27/2022    MPV 10.3 10/27/2022    NRBC 0.0 10/27/2022    INR 0.96 10/17/2021     Lab Results   Component Value Date     (L) 10/27/2022    K 4.0 10/27/2022    CL 96 (L) 10/27/2022    CO2 29 10/27/2022    BUN 44 (H) 10/27/2022    MG 2.5 (H) 10/27/2022     Lab Results   Component Value Date    AST 15 10/27/2022    ALT 24 10/27/2022     Lab Results   Component Value Date     (H) 10/27/2022    HGBA1C 6.6 10/27/2022     No results found for: CHOL, HDL, LDL, TRIG, CHOLHDL    CARDIAC STUDIES REVIEWED:    OTHER IMAGING STUDIES REVIEWED:        ASSESSMENT:   There are no diagnoses linked to this encounter.        PLAN:  1.  Hypertension, adequate control off amlodipine, will accept bp systoloic 140 - 160 to avoid orthostatic symptoms.   2.  Lower extremity edema, has resolved  3. Orthostatic symptoms have resolved.

## 2022-12-31 ENCOUNTER — EXTERNAL CHRONIC CARE MANAGEMENT (OUTPATIENT)
Dept: FAMILY MEDICINE | Facility: CLINIC | Age: 87
End: 2022-12-31
Payer: MEDICARE

## 2022-12-31 PROCEDURE — G0511 CCM/BHI BY RHC/FQHC 20MIN MO: HCPCS | Mod: ,,, | Performed by: NURSE PRACTITIONER

## 2022-12-31 PROCEDURE — G0511 PR CHRONIC CARE MGMT, RHC OR FQHC ONLY, 20 MINS OR MORE: ICD-10-PCS | Mod: ,,, | Performed by: NURSE PRACTITIONER

## 2023-01-11 RX ORDER — FUROSEMIDE 20 MG/1
20 TABLET ORAL DAILY
Qty: 90 TABLET | Refills: 3 | Status: SHIPPED | OUTPATIENT
Start: 2023-01-11 | End: 2024-01-30

## 2023-01-31 ENCOUNTER — EXTERNAL CHRONIC CARE MANAGEMENT (OUTPATIENT)
Dept: FAMILY MEDICINE | Facility: CLINIC | Age: 88
End: 2023-01-31
Payer: MEDICARE

## 2023-01-31 PROCEDURE — G0511 PR CHRONIC CARE MGMT, RHC OR FQHC ONLY, 20 MINS OR MORE: ICD-10-PCS | Mod: ,,, | Performed by: NURSE PRACTITIONER

## 2023-01-31 PROCEDURE — G0511 CCM/BHI BY RHC/FQHC 20MIN MO: HCPCS | Mod: ,,, | Performed by: NURSE PRACTITIONER

## 2023-02-28 ENCOUNTER — EXTERNAL CHRONIC CARE MANAGEMENT (OUTPATIENT)
Dept: FAMILY MEDICINE | Facility: CLINIC | Age: 88
End: 2023-02-28
Payer: MEDICARE

## 2023-02-28 PROCEDURE — G0511 PR CHRONIC CARE MGMT, RHC OR FQHC ONLY, 20 MINS OR MORE: ICD-10-PCS | Mod: ,,, | Performed by: NURSE PRACTITIONER

## 2023-02-28 PROCEDURE — G0511 CCM/BHI BY RHC/FQHC 20MIN MO: HCPCS | Mod: ,,, | Performed by: NURSE PRACTITIONER

## 2023-03-17 ENCOUNTER — DOCUMENTATION ONLY (OUTPATIENT)
Dept: FAMILY MEDICINE | Facility: CLINIC | Age: 88
End: 2023-03-17
Payer: MEDICARE

## 2023-03-17 NOTE — PROGRESS NOTES
Clinic was called today by Nas inquiring about a message sent to Jia Montero's staff messaging inbox stating the daughter is concerned for the mother regarding mood swings and anxiety; and that according to the daughter the mother won't take a medicine prescribed to her if she knows it is for anxiety. After reviewing the patient's chart, it appears the pt is on hospice through [facility]. Informed the [facility] rep that they would need to get in contact with the hospice nurse to find out what needs to be done; or if not on hospice anymore, pt will need to be seen due to not having been seen in the clinic in quite a few months and daughter wanting a new med prescribed. Nas verbalized understanding.

## 2023-03-31 ENCOUNTER — EXTERNAL CHRONIC CARE MANAGEMENT (OUTPATIENT)
Dept: FAMILY MEDICINE | Facility: CLINIC | Age: 88
End: 2023-03-31
Payer: MEDICARE

## 2023-03-31 PROCEDURE — G0511 CCM/BHI BY RHC/FQHC 20MIN MO: HCPCS | Mod: ,,, | Performed by: NURSE PRACTITIONER

## 2023-03-31 PROCEDURE — G0511 PR CHRONIC CARE MGMT, RHC OR FQHC ONLY, 20 MINS OR MORE: ICD-10-PCS | Mod: ,,, | Performed by: NURSE PRACTITIONER

## 2023-04-05 RX ORDER — METOLAZONE 2.5 MG/1
5 TABLET ORAL DAILY
Qty: 8 TABLET | Refills: 0 | Status: SHIPPED | OUTPATIENT
Start: 2023-04-05 | End: 2023-11-02

## 2023-04-05 NOTE — TELEPHONE ENCOUNTER
Pts. Daughter called and stated that her mom had a large amount of edema.  Stated that you had given her some Zaroxolyn before when this happened.  She was wanting to get a prescription for this again.

## 2023-04-27 RX ORDER — PANTOPRAZOLE SODIUM 40 MG/1
40 TABLET, DELAYED RELEASE ORAL DAILY
Qty: 90 TABLET | Refills: 1 | Status: SHIPPED | OUTPATIENT
Start: 2023-04-27 | End: 2023-11-29

## 2023-04-28 DIAGNOSIS — I10 HYPERTENSION, UNSPECIFIED TYPE: ICD-10-CM

## 2023-04-28 DIAGNOSIS — R60.0 LOWER EXTREMITY EDEMA: ICD-10-CM

## 2023-04-28 RX ORDER — LISINOPRIL 40 MG/1
40 TABLET ORAL DAILY
Qty: 90 TABLET | Refills: 1 | Status: SHIPPED | OUTPATIENT
Start: 2023-04-28 | End: 2023-10-28

## 2023-04-30 ENCOUNTER — EXTERNAL CHRONIC CARE MANAGEMENT (OUTPATIENT)
Dept: FAMILY MEDICINE | Facility: CLINIC | Age: 88
End: 2023-04-30
Payer: MEDICARE

## 2023-04-30 PROCEDURE — G0511 CCM/BHI BY RHC/FQHC 20MIN MO: HCPCS | Mod: ,,, | Performed by: NURSE PRACTITIONER

## 2023-04-30 PROCEDURE — G0511 PR CHRONIC CARE MGMT, RHC OR FQHC ONLY, 20 MINS OR MORE: ICD-10-PCS | Mod: ,,, | Performed by: NURSE PRACTITIONER

## 2023-05-31 ENCOUNTER — EXTERNAL CHRONIC CARE MANAGEMENT (OUTPATIENT)
Dept: FAMILY MEDICINE | Facility: CLINIC | Age: 88
End: 2023-05-31
Payer: MEDICARE

## 2023-05-31 PROCEDURE — G0511 PR CHRONIC CARE MGMT, RHC OR FQHC ONLY, 20 MINS OR MORE: ICD-10-PCS | Mod: ,,, | Performed by: NURSE PRACTITIONER

## 2023-05-31 PROCEDURE — G0511 CCM/BHI BY RHC/FQHC 20MIN MO: HCPCS | Mod: ,,, | Performed by: NURSE PRACTITIONER

## 2023-08-16 ENCOUNTER — OFFICE VISIT (OUTPATIENT)
Dept: CARDIOLOGY | Facility: CLINIC | Age: 88
End: 2023-08-16
Payer: MEDICARE

## 2023-08-16 VITALS
BODY MASS INDEX: 23.34 KG/M2 | HEIGHT: 64 IN | HEART RATE: 91 BPM | SYSTOLIC BLOOD PRESSURE: 170 MMHG | DIASTOLIC BLOOD PRESSURE: 88 MMHG | OXYGEN SATURATION: 96 %

## 2023-08-16 DIAGNOSIS — R60.0 LOWER EXTREMITY EDEMA: ICD-10-CM

## 2023-08-16 DIAGNOSIS — R00.2 INTERMITTENT PALPITATIONS: ICD-10-CM

## 2023-08-16 DIAGNOSIS — R42 ORTHOSTATIC DIZZINESS: ICD-10-CM

## 2023-08-16 DIAGNOSIS — I10 ESSENTIAL HYPERTENSION: Primary | ICD-10-CM

## 2023-08-16 PROCEDURE — 93005 ELECTROCARDIOGRAM TRACING: CPT | Mod: PBBFAC | Performed by: INTERNAL MEDICINE

## 2023-08-16 PROCEDURE — 99213 OFFICE O/P EST LOW 20 MIN: CPT | Mod: PBBFAC | Performed by: INTERNAL MEDICINE

## 2023-08-16 PROCEDURE — 93010 EKG 12-LEAD: ICD-10-PCS | Mod: S$PBB,,, | Performed by: INTERNAL MEDICINE

## 2023-08-16 PROCEDURE — 99214 PR OFFICE/OUTPT VISIT, EST, LEVL IV, 30-39 MIN: ICD-10-PCS | Mod: S$PBB,,, | Performed by: INTERNAL MEDICINE

## 2023-08-16 PROCEDURE — 93010 ELECTROCARDIOGRAM REPORT: CPT | Mod: S$PBB,,, | Performed by: INTERNAL MEDICINE

## 2023-08-16 PROCEDURE — 99214 OFFICE O/P EST MOD 30 MIN: CPT | Mod: S$PBB,,, | Performed by: INTERNAL MEDICINE

## 2023-08-22 NOTE — PROGRESS NOTES
Cardiology Clinic Note:    PCP: Jia Montero FNP    REFERRING PHYSICIAN: Jia Montero FNP    CHIEF COMPLAINT:   Chief Complaint   Patient presents with    Follow-up     6 month    Shortness of Breath     With exertion    Edema     Goes down at night        HISTORY OF PRESENT ILLNESS:  Purnima Cardona is a 89 y.o. female who presents for evaluation of hypertension, shortness of breath.      Pt reports has been more active, moved to assisted living, notes much more socially interactive, shortness of breath with activity has resolved. . She reports chest pain has resolved.  She  is monitoring blood pressure has been well controlled.  She notes mild bilateral peripheral has improved, back to one Lasix daily. . Patient states she is walking  with walker.    Patient denies dizziness.  Denies chest pain, pressure, tightness or squeezing.      Review of Systems   Constitutional: Negative for diaphoresis, malaise/fatigue, night sweats and weight gain.   HENT:  Negative for congestion, ear pain, hearing loss, nosebleeds and sore throat.    Eyes:  Negative for blurred vision, double vision, pain, photophobia and visual disturbance.   Cardiovascular:  Negative for chest pain, claudication, dyspnea on exertion, irregular heartbeat, leg swelling, near-syncope, orthopnea, palpitations and syncope.   Respiratory:  Negative for cough, shortness of breath, sleep disturbances due to breathing, snoring and wheezing.    Endocrine: Negative for cold intolerance, heat intolerance, polydipsia, polyphagia and polyuria.   Hematologic/Lymphatic: Negative for bleeding problem. Does not bruise/bleed easily.   Skin:  Negative for dry skin, flushing, itching, rash and skin cancer.   Musculoskeletal:  Negative for arthritis, back pain, falls, joint pain, muscle cramps, muscle weakness and myalgias.   Gastrointestinal:  Negative for abdominal pain, change in bowel habit, constipation, diarrhea, dysphagia, heartburn, nausea and  vomiting.   Genitourinary:  Negative for bladder incontinence, dysuria, flank pain, frequency and nocturia.   Neurological:  Negative for dizziness, focal weakness, headaches, light-headedness, loss of balance, numbness, paresthesias and seizures.   Psychiatric/Behavioral:  Negative for depression, memory loss and substance abuse. The patient is not nervous/anxious.    Allergic/Immunologic: Negative for environmental allergies.          PAST MEDICAL HISTORY:  Past Medical History:   Diagnosis Date    Abnormal EKG     Essential hypertension     Hyperlipidemia     Shortness of breath        PAST SURGICAL HISTORY:  History reviewed. No pertinent surgical history.    SOCIAL HISTORY:  Social History     Socioeconomic History    Marital status:    Tobacco Use    Smoking status: Never    Smokeless tobacco: Never   Substance and Sexual Activity    Alcohol use: Never    Drug use: Never    Sexual activity: Not Currently       FAMILY HISTORY:  Family History   Problem Relation Age of Onset    Breast cancer Mother     Stroke Father        ALLERGIES:  Allergies as of 08/16/2023 - Reviewed 08/16/2023   Allergen Reaction Noted    Clonidine  03/19/2021    Hiprex [methenamine hippurate]  03/19/2021    Sulfa (sulfonamide antibiotics)  03/19/2021         MEDICATIONS:  Current Outpatient Medications on File Prior to Visit   Medication Sig Dispense Refill    acetaminophen (TYLENOL EXTRA STRENGTH ORAL) Take by mouth.      aspirin (ECOTRIN) 81 MG EC tablet Take 81 mg by mouth once daily.      calcium carbonate (OS-NASREEN) 600 mg calcium (1,500 mg) Tab Take 600 mg by mouth once daily. Take 1 tablet po BID      furosemide (LASIX) 20 MG tablet Take 1 tablet (20 mg total) by mouth once daily. 90 tablet 3    lisinopriL (PRINIVIL,ZESTRIL) 40 MG tablet Take 1 tablet (40 mg total) by mouth once daily. 90 tablet 1    multivit-min/ferrous fumarate (MULTI VITAMIN ORAL) Take by mouth.      pantoprazole (PROTONIX) 40 MG tablet Take 1 tablet (40 mg  "total) by mouth once daily. 90 tablet 1    potassium 99 mg Tab Take 1 tablet by mouth once daily.      metOLazone (ZAROXOLYN) 2.5 MG tablet Take 2 tablets (5 mg total) by mouth once daily. for 4 days 8 tablet 0     No current facility-administered medications on file prior to visit.          PHYSICAL EXAM:  Blood pressure (!) 170/88, pulse 91, height 5' 4" (1.626 m), SpO2 96 %.  Wt Readings from Last 3 Encounters:   12/20/22 61.7 kg (136 lb)   10/31/22 64.9 kg (143 lb)   10/27/22 65.3 kg (144 lb)      Body mass index is 23.34 kg/m².    Physical Exam  Vitals and nursing note reviewed.   Constitutional:       Appearance: Normal appearance. She is normal weight.   HENT:      Head: Normocephalic and atraumatic.      Right Ear: External ear normal.      Left Ear: External ear normal.   Eyes:      General: No scleral icterus.        Right eye: No discharge.         Left eye: No discharge.      Extraocular Movements: Extraocular movements intact.      Conjunctiva/sclera: Conjunctivae normal.      Pupils: Pupils are equal, round, and reactive to light.   Cardiovascular:      Rate and Rhythm: Normal rate and regular rhythm.      Pulses: Normal pulses.      Heart sounds: Normal heart sounds. No murmur heard.     No friction rub. No gallop.   Pulmonary:      Effort: Pulmonary effort is normal.      Breath sounds: Normal breath sounds. No wheezing, rhonchi or rales.   Chest:      Chest wall: No tenderness.   Abdominal:      General: Abdomen is flat. Bowel sounds are normal. There is no distension.      Palpations: Abdomen is soft.      Tenderness: There is no abdominal tenderness. There is no guarding or rebound.   Musculoskeletal:         General: No swelling or tenderness. Normal range of motion.      Cervical back: Normal range of motion and neck supple.   Skin:     General: Skin is warm and dry.      Findings: No erythema or rash.   Neurological:      General: No focal deficit present.      Mental Status: She is alert and " "oriented to person, place, and time.      Cranial Nerves: No cranial nerve deficit.      Motor: No weakness.      Gait: Gait normal.   Psychiatric:         Mood and Affect: Mood normal.         Behavior: Behavior normal.         Thought Content: Thought content normal.         Judgment: Judgment normal.          LABS REVIEWED:  Lab Results   Component Value Date    WBC 8.42 10/27/2022    RBC 4.41 10/27/2022    HGB 13.2 10/27/2022    HCT 40.8 10/27/2022    MCV 92.5 10/27/2022    MCH 29.9 10/27/2022    MCHC 32.4 10/27/2022    RDW 13.9 10/27/2022     10/27/2022    MPV 10.3 10/27/2022    NRBC 0.0 10/27/2022    INR 0.96 10/17/2021     Lab Results   Component Value Date     (L) 10/27/2022    K 4.0 10/27/2022    CL 96 (L) 10/27/2022    CO2 29 10/27/2022    BUN 44 (H) 10/27/2022    MG 2.5 (H) 10/27/2022     Lab Results   Component Value Date    AST 15 10/27/2022    ALT 24 10/27/2022     Lab Results   Component Value Date     (H) 10/27/2022    HGBA1C 6.6 10/27/2022     No results found for: "CHOL", "HDL", "LDL", "TRIG", "CHOLHDL"    CARDIAC STUDIES REVIEWED:    OTHER IMAGING STUDIES REVIEWED:        ASSESSMENT:   Essential hypertension  -     EKG 12-lead; Future            PLAN:  1.  Hypertension,not clear if has adequate control off amlodipine, will accept bp systoloic 140 - 160 to avoid orthostatic symptoms, recommend monitor AM and PM x 2 weeks and call office with bp readings to review.    2.  Lower extremity edema, has resolved  3. Orthostatic symptoms have resolved.  4. Shortness of breath with exertion has resolved, encourage ambulation, goal walking 10 mins AM and PM with walker    Follow up in 6 months, sooner if symptoms change        "

## 2023-08-31 ENCOUNTER — TELEPHONE (OUTPATIENT)
Dept: CARDIOLOGY | Facility: CLINIC | Age: 88
End: 2023-08-31
Payer: MEDICARE

## 2023-08-31 NOTE — TELEPHONE ENCOUNTER
I informed patients daughter that Dr. Dowell wanted patient to start amlodipine 2.5 mg q d for her increased blood pressure. The daughter is aware the medication has been called in and should be there by the end of today.  -HW

## 2023-09-05 RX ORDER — AMLODIPINE BESYLATE 2.5 MG/1
2.5 TABLET ORAL DAILY
Qty: 30 TABLET | Refills: 5 | Status: SHIPPED | OUTPATIENT
Start: 2023-09-05 | End: 2024-02-05

## 2023-10-14 ENCOUNTER — HOSPITAL ENCOUNTER (INPATIENT)
Facility: HOSPITAL | Age: 88
LOS: 2 days | Discharge: HOME | DRG: 379 | End: 2023-10-16
Attending: EMERGENCY MEDICINE | Admitting: FAMILY MEDICINE
Payer: MEDICARE

## 2023-10-14 DIAGNOSIS — R07.9 CHEST PAIN: ICD-10-CM

## 2023-10-14 DIAGNOSIS — K92.2 ACUTE GI BLEEDING: ICD-10-CM

## 2023-10-14 DIAGNOSIS — K92.2 UPPER GI BLEEDING: Primary | ICD-10-CM

## 2023-10-14 LAB
ALBUMIN SERPL BCP-MCNC: 3 G/DL (ref 3.5–5)
ALBUMIN/GLOB SERPL: 1 {RATIO}
ALP SERPL-CCNC: 66 U/L (ref 55–142)
ALT SERPL W P-5'-P-CCNC: 20 U/L (ref 13–56)
ANION GAP SERPL CALCULATED.3IONS-SCNC: 9 MMOL/L (ref 7–16)
AST SERPL W P-5'-P-CCNC: 17 U/L (ref 15–37)
BASOPHILS # BLD AUTO: 0.03 K/UL (ref 0–0.2)
BASOPHILS NFR BLD AUTO: 0.4 % (ref 0–1)
BILIRUB SERPL-MCNC: 0.6 MG/DL (ref ?–1.2)
BUN SERPL-MCNC: 45 MG/DL (ref 7–18)
BUN/CREAT SERPL: 43 (ref 6–20)
CALCIUM SERPL-MCNC: 8.3 MG/DL (ref 8.5–10.1)
CHLORIDE SERPL-SCNC: 106 MMOL/L (ref 98–107)
CO2 SERPL-SCNC: 30 MMOL/L (ref 21–32)
CREAT SERPL-MCNC: 1.05 MG/DL (ref 0.55–1.02)
DIFFERENTIAL METHOD BLD: ABNORMAL
EGFR (NO RACE VARIABLE) (RUSH/TITUS): 51 ML/MIN/1.73M2
EOSINOPHIL # BLD AUTO: 0.01 K/UL (ref 0–0.5)
EOSINOPHIL NFR BLD AUTO: 0.1 % (ref 1–4)
ERYTHROCYTE [DISTWIDTH] IN BLOOD BY AUTOMATED COUNT: 14.9 % (ref 11.5–14.5)
GLOBULIN SER-MCNC: 3 G/DL (ref 2–4)
GLUCOSE SERPL-MCNC: 142 MG/DL (ref 74–106)
HCT VFR BLD AUTO: 27.7 % (ref 38–47)
HCT VFR BLD AUTO: 29 % (ref 38–47)
HCT VFR BLD AUTO: 29.1 % (ref 38–47)
HGB BLD-MCNC: 9.1 G/DL (ref 12–16)
HGB BLD-MCNC: 9.5 G/DL (ref 12–16)
HGB BLD-MCNC: 9.8 G/DL (ref 12–16)
IMM GRANULOCYTES # BLD AUTO: 0.02 K/UL (ref 0–0.04)
IMM GRANULOCYTES NFR BLD: 0.3 % (ref 0–0.4)
INDIRECT COOMBS: NORMAL
INR BLD: 1.09
LIPASE SERPL-CCNC: <19 U/L (ref 16–77)
LYMPHOCYTES # BLD AUTO: 1.65 K/UL (ref 1–4.8)
LYMPHOCYTES NFR BLD AUTO: 22.4 % (ref 27–41)
MCH RBC QN AUTO: 30.7 PG (ref 27–31)
MCHC RBC AUTO-ENTMCNC: 33.7 G/DL (ref 32–36)
MCV RBC AUTO: 91.2 FL (ref 80–96)
MONOCYTES # BLD AUTO: 0.66 K/UL (ref 0–0.8)
MONOCYTES NFR BLD AUTO: 9 % (ref 2–6)
MPC BLD CALC-MCNC: 11.3 FL (ref 9.4–12.4)
NEUTROPHILS # BLD AUTO: 5 K/UL (ref 1.8–7.7)
NEUTROPHILS NFR BLD AUTO: 67.8 % (ref 53–65)
NRBC # BLD AUTO: 0 X10E3/UL
NRBC, AUTO (.00): 0 %
PLATELET # BLD AUTO: 190 K/UL (ref 150–400)
POC OCCULT BLOOD STOOL: POSITIVE
POTASSIUM SERPL-SCNC: 4.5 MMOL/L (ref 3.5–5.1)
PROT SERPL-MCNC: 6 G/DL (ref 6.4–8.2)
PROTHROMBIN TIME: 14 SECONDS (ref 11.7–14.7)
RBC # BLD AUTO: 3.19 M/UL (ref 4.2–5.4)
RH BLD: NORMAL
SODIUM SERPL-SCNC: 140 MMOL/L (ref 136–145)
SPECIMEN OUTDATE: NORMAL
WBC # BLD AUTO: 7.37 K/UL (ref 4.5–11)

## 2023-10-14 PROCEDURE — 85014 HEMATOCRIT: CPT | Performed by: FAMILY MEDICINE

## 2023-10-14 PROCEDURE — 85025 COMPLETE CBC W/AUTO DIFF WBC: CPT | Performed by: EMERGENCY MEDICINE

## 2023-10-14 PROCEDURE — 11000001 HC ACUTE MED/SURG PRIVATE ROOM

## 2023-10-14 PROCEDURE — 99223 1ST HOSP IP/OBS HIGH 75: CPT | Mod: AI,,, | Performed by: FAMILY MEDICINE

## 2023-10-14 PROCEDURE — 86850 RBC ANTIBODY SCREEN: CPT | Performed by: EMERGENCY MEDICINE

## 2023-10-14 PROCEDURE — 80053 COMPREHEN METABOLIC PANEL: CPT | Performed by: EMERGENCY MEDICINE

## 2023-10-14 PROCEDURE — 85610 PROTHROMBIN TIME: CPT | Performed by: EMERGENCY MEDICINE

## 2023-10-14 PROCEDURE — 99222 PR INITIAL HOSPITAL CARE,LEVL II: ICD-10-PCS | Mod: ,,, | Performed by: INTERNAL MEDICINE

## 2023-10-14 PROCEDURE — 99284 PR EMERGENCY DEPT VISIT,LEVEL IV: ICD-10-PCS | Mod: ,,, | Performed by: EMERGENCY MEDICINE

## 2023-10-14 PROCEDURE — 99223 PR INITIAL HOSPITAL CARE,LEVL III: ICD-10-PCS | Mod: AI,,, | Performed by: FAMILY MEDICINE

## 2023-10-14 PROCEDURE — 82272 OCCULT BLD FECES 1-3 TESTS: CPT

## 2023-10-14 PROCEDURE — 99285 EMERGENCY DEPT VISIT HI MDM: CPT

## 2023-10-14 PROCEDURE — 25000003 PHARM REV CODE 250: Performed by: FAMILY MEDICINE

## 2023-10-14 PROCEDURE — 83690 ASSAY OF LIPASE: CPT | Performed by: EMERGENCY MEDICINE

## 2023-10-14 PROCEDURE — 99222 1ST HOSP IP/OBS MODERATE 55: CPT | Mod: ,,, | Performed by: INTERNAL MEDICINE

## 2023-10-14 PROCEDURE — 25000003 PHARM REV CODE 250: Performed by: INTERNAL MEDICINE

## 2023-10-14 PROCEDURE — C9113 INJ PANTOPRAZOLE SODIUM, VIA: HCPCS | Performed by: FAMILY MEDICINE

## 2023-10-14 PROCEDURE — 99284 EMERGENCY DEPT VISIT MOD MDM: CPT | Mod: ,,, | Performed by: EMERGENCY MEDICINE

## 2023-10-14 PROCEDURE — 63600175 PHARM REV CODE 636 W HCPCS: Performed by: FAMILY MEDICINE

## 2023-10-14 PROCEDURE — 94761 N-INVAS EAR/PLS OXIMETRY MLT: CPT

## 2023-10-14 RX ORDER — ONDANSETRON 2 MG/ML
4 INJECTION INTRAMUSCULAR; INTRAVENOUS EVERY 8 HOURS PRN
Status: DISCONTINUED | OUTPATIENT
Start: 2023-10-14 | End: 2023-10-16 | Stop reason: HOSPADM

## 2023-10-14 RX ORDER — SODIUM CHLORIDE 0.9 % (FLUSH) 0.9 %
10 SYRINGE (ML) INJECTION EVERY 12 HOURS PRN
Status: DISCONTINUED | OUTPATIENT
Start: 2023-10-14 | End: 2023-10-16 | Stop reason: HOSPADM

## 2023-10-14 RX ORDER — IBUPROFEN 200 MG
16 TABLET ORAL
Status: DISCONTINUED | OUTPATIENT
Start: 2023-10-14 | End: 2023-10-16 | Stop reason: HOSPADM

## 2023-10-14 RX ORDER — SODIUM CHLORIDE 0.9 % (FLUSH) 0.9 %
10 SYRINGE (ML) INJECTION
Status: DISCONTINUED | OUTPATIENT
Start: 2023-10-14 | End: 2023-10-16 | Stop reason: HOSPADM

## 2023-10-14 RX ORDER — GLUCAGON 1 MG
1 KIT INJECTION
Status: DISCONTINUED | OUTPATIENT
Start: 2023-10-14 | End: 2023-10-16 | Stop reason: HOSPADM

## 2023-10-14 RX ORDER — POLYETHYLENE GLYCOL 3350, SODIUM SULFATE ANHYDROUS, SODIUM BICARBONATE, SODIUM CHLORIDE, POTASSIUM CHLORIDE 236; 22.74; 6.74; 5.86; 2.97 G/4L; G/4L; G/4L; G/4L; G/4L
4000 POWDER, FOR SOLUTION ORAL ONCE
Status: COMPLETED | OUTPATIENT
Start: 2023-10-14 | End: 2023-10-14

## 2023-10-14 RX ORDER — SODIUM CHLORIDE, SODIUM LACTATE, POTASSIUM CHLORIDE, CALCIUM CHLORIDE 600; 310; 30; 20 MG/100ML; MG/100ML; MG/100ML; MG/100ML
INJECTION, SOLUTION INTRAVENOUS CONTINUOUS
Status: DISCONTINUED | OUTPATIENT
Start: 2023-10-14 | End: 2023-10-15

## 2023-10-14 RX ORDER — TALC
6 POWDER (GRAM) TOPICAL NIGHTLY PRN
Status: DISCONTINUED | OUTPATIENT
Start: 2023-10-14 | End: 2023-10-16 | Stop reason: HOSPADM

## 2023-10-14 RX ORDER — IBUPROFEN 200 MG
24 TABLET ORAL
Status: DISCONTINUED | OUTPATIENT
Start: 2023-10-14 | End: 2023-10-16 | Stop reason: HOSPADM

## 2023-10-14 RX ORDER — ACETAMINOPHEN 325 MG/1
650 TABLET ORAL EVERY 4 HOURS PRN
Status: DISCONTINUED | OUTPATIENT
Start: 2023-10-14 | End: 2023-10-16 | Stop reason: HOSPADM

## 2023-10-14 RX ORDER — NALOXONE HCL 0.4 MG/ML
0.02 VIAL (ML) INJECTION
Status: DISCONTINUED | OUTPATIENT
Start: 2023-10-14 | End: 2023-10-16 | Stop reason: HOSPADM

## 2023-10-14 RX ORDER — TALC
6 POWDER (GRAM) TOPICAL NIGHTLY PRN
Status: DISCONTINUED | OUTPATIENT
Start: 2023-10-14 | End: 2023-10-14

## 2023-10-14 RX ADMIN — SODIUM CHLORIDE, POTASSIUM CHLORIDE, SODIUM LACTATE AND CALCIUM CHLORIDE: 600; 310; 30; 20 INJECTION, SOLUTION INTRAVENOUS at 12:10

## 2023-10-14 RX ADMIN — PANTOPRAZOLE SODIUM 8 MG/HR: 40 INJECTION, POWDER, FOR SOLUTION INTRAVENOUS at 06:10

## 2023-10-14 RX ADMIN — ACETAMINOPHEN 650 MG: 325 TABLET ORAL at 08:10

## 2023-10-14 RX ADMIN — SODIUM CHLORIDE, POTASSIUM CHLORIDE, SODIUM LACTATE AND CALCIUM CHLORIDE: 600; 310; 30; 20 INJECTION, SOLUTION INTRAVENOUS at 01:10

## 2023-10-14 RX ADMIN — POLYETHYLENE GLYCOL 3350, SODIUM SULFATE ANHYDROUS, SODIUM BICARBONATE, SODIUM CHLORIDE, POTASSIUM CHLORIDE 4000 ML: 236; 22.74; 6.74; 5.86; 2.97 POWDER, FOR SOLUTION ORAL at 02:10

## 2023-10-14 RX ADMIN — PANTOPRAZOLE SODIUM 8 MG/HR: 40 INJECTION, POWDER, FOR SOLUTION INTRAVENOUS at 12:10

## 2023-10-14 NOTE — HPI
Patient is an 89 y/o [facility] resident at the [facility] that reports several days of diarrhea.  At about 4am had bowel movement with a great deal of blood and clots.  Patient thinks bright red.  Report to ER was more maroon.  Stool heme positive in ER.  Patient does report intermittent slight bleeding in the past she attributes to hemorrhoids.  Nothing like this however.  She denies N/V abdominal pain. No fever or chills.

## 2023-10-14 NOTE — SUBJECTIVE & OBJECTIVE
Past Medical History:   Diagnosis Date    Abnormal EKG     Essential hypertension     Hyperlipidemia     Shortness of breath        History reviewed. No pertinent surgical history.    Review of patient's allergies indicates:   Allergen Reactions    Clonidine     Hiprex [methenamine hippurate]     Sulfa (sulfonamide antibiotics)      Family History       Problem Relation (Age of Onset)    Breast cancer Mother    Stroke Father          Tobacco Use    Smoking status: Never    Smokeless tobacco: Never   Substance and Sexual Activity    Alcohol use: Never    Drug use: Never    Sexual activity: Not Currently     Review of Systems   Constitutional:  Negative for chills, fever and unexpected weight change.   HENT:  Negative for sore throat and trouble swallowing.    Respiratory:  Negative for cough and shortness of breath.    Cardiovascular:  Negative for chest pain.   Gastrointestinal:  Negative for abdominal distention, abdominal pain, nausea and vomiting.   Genitourinary:  Negative for dysuria, flank pain and hematuria.   Neurological:  Negative for syncope, speech difficulty and weakness.   Psychiatric/Behavioral:  Negative for behavioral problems and confusion.      Objective:     Vital Signs (Most Recent):  Temp: 98.4 °F (36.9 °C) (10/14/23 0454)  Pulse: 82 (10/14/23 0634)  Resp: 14 (10/14/23 0634)  BP: (!) 125/53 (10/14/23 0634)  SpO2: 96 % (10/14/23 0634) Vital Signs (24h Range):  Temp:  [98.4 °F (36.9 °C)] 98.4 °F (36.9 °C)  Pulse:  [] 82  Resp:  [14-18] 14  SpO2:  [95 %-97 %] 96 %  BP: (107-134)/(45-65) 125/53     Weight: 61.7 kg (136 lb 0.4 oz) (10/14/23 0455)  Body mass index is 23.35 kg/m².    No intake or output data in the 24 hours ending 10/14/23 1142    Lines/Drains/Airways       Peripheral Intravenous Line  Duration                  Peripheral IV - Single Lumen 10/14/23 0520 18 G Anterior;Right Forearm <1 day                     Physical Exam  Vitals and nursing note reviewed.   Constitutional:        General: She is not in acute distress.     Appearance: She is not ill-appearing or diaphoretic.   HENT:      Head: Normocephalic and atraumatic.      Mouth/Throat:      Pharynx: Oropharynx is clear.   Eyes:      General: No scleral icterus.     Extraocular Movements: Extraocular movements intact.   Cardiovascular:      Rate and Rhythm: Normal rate and regular rhythm.      Pulses: Normal pulses.      Heart sounds: Normal heart sounds.   Pulmonary:      Effort: Pulmonary effort is normal. No respiratory distress.      Breath sounds: Normal breath sounds.   Abdominal:      General: Bowel sounds are normal. There is no distension.      Palpations: Abdomen is soft.      Tenderness: There is no abdominal tenderness.   Musculoskeletal:         General: No swelling or tenderness.   Skin:     General: Skin is warm and dry.      Coloration: Skin is not jaundiced.   Neurological:      General: No focal deficit present.      Mental Status: She is alert and oriented to person, place, and time.      Cranial Nerves: No cranial nerve deficit.      Sensory: No sensory deficit.   Psychiatric:         Mood and Affect: Mood normal.         Behavior: Behavior normal.          Significant Labs:  Recent Lab Results         10/14/23  0519   10/14/23  0507        Albumin/Globulin Ratio 1.0         Albumin 3.0         ALP 66         ALT 20         Anion Gap 9         AST 17         Baso # 0.03         Basophil % 0.4         BILIRUBIN TOTAL 0.6         BUN 45         BUN/CREAT RATIO 43         Calcium 8.3         Chloride 106         CO2 30         Creatinine 1.05         Differential Method Auto         eGFR 51         Eos # 0.01         Eosinophil % 0.1         Fecal Occult Blood   Positive       Globulin, Total 3.0         Glucose 142         Group & Rh A NEG         Hematocrit 29.1         Hemoglobin 9.8         Immature Grans (Abs) 0.02         Immature Granulocytes 0.3         INDIRECT ANDREA NEG         INR 1.09         Lipase <19          Lymph # 1.65         Lymph % 22.4         MCH 30.7         MCHC 33.7         MCV 91.2         Mono # 0.66         Mono % 9.0         MPV 11.3         Neutrophils, Abs 5.00         Neutrophils Relative 67.8         nRBC 0.0         NUCLEATED RBC ABSOLUTE 0.00         Platelet Count 190         Potassium 4.5         PROTEIN TOTAL 6.0         Protime 14.0         RBC 3.19         RDW 14.9         Sodium 140         Specimen Outdate 10/17/2023 23:59         WBC 7.37                 Significant Imaging:  Imaging results within the past 24 hours have been reviewed.

## 2023-10-14 NOTE — ASSESSMENT & PLAN NOTE
Pt presents with painless hematochezia. She appears stable currently with no further episodes hematochezia in 6+ hours. Her BUN/creat ratio is elevated at 43 but this appears to be likely lower GI bleed. Agree with plan for observation with serial hemoglobins and blood product support as needed. She agrees to plan for colonoscopy tomorrow.

## 2023-10-14 NOTE — HPI
"88 yo female with HTN and hyperlipidemia presents with c/o episode of maroon hematochezia earlier this AM. She states there were large clots associated with this. In the ED she was hemodynamically normal with Hgb 9.8, down from 12 range a year ago. She denies abdominal pain or nausea. She has no hx of prior GI bleeding "other than hemorrhoids" she says and has never had endoscopy in the past. Denies dysphagia, ABN BM's in past or weight loss.  "

## 2023-10-14 NOTE — ED NOTES
Pt requested food, spoke to dr hughes r/t request of food from patient. States pt is NPO at this time except meds.

## 2023-10-14 NOTE — H&P
[facility]  [facility] Medicine  History & Physical    Patient Name: Purnima Cardona  MRN: [id number]  Patient Class: IP- Inpatient  Admission Date: 10/14/2023  Attending Physician: Adam Alfred Jr., MD   Primary Care Provider: Jia Montero FNP         Patient information was obtained from patient and relative(s).     Subjective:     Principal Problem:Upper GI bleeding    Chief Complaint:   Chief Complaint   Patient presents with    Diarrhea     EMS from [facility]         HPI: Patient is an 89 y/o WF resident at the [facility] that reports several days of diarrhea.  At about 4am had bowel movement with a great deal of blood and clots.  Patient thinks bright red.  Report to ER was more maroon.  Stool heme positive in ER.  Patient does report intermittent slight bleeding in the past she attributes to hemorrhoids.  Nothing like this however.  She denies N/V abdominal pain. No fever or chills.       Past Medical History:   Diagnosis Date    Abnormal EKG     Essential hypertension     Hyperlipidemia     Shortness of breath        History reviewed. No pertinent surgical history.    Review of patient's allergies indicates:   Allergen Reactions    Clonidine     Hiprex [methenamine hippurate]     Sulfa (sulfonamide antibiotics)        No current facility-administered medications on file prior to encounter.     Current Outpatient Medications on File Prior to Encounter   Medication Sig    acetaminophen (TYLENOL EXTRA STRENGTH ORAL) Take by mouth.    amLODIPine (NORVASC) 2.5 MG tablet Take 1 tablet (2.5 mg total) by mouth once daily.    aspirin (ECOTRIN) 81 MG EC tablet Take 81 mg by mouth once daily.    calcium carbonate (OS-NASREEN) 600 mg calcium (1,500 mg) Tab Take 600 mg by mouth once daily. Take 1 tablet po BID    furosemide (LASIX) 20 MG tablet Take 1 tablet (20 mg total) by mouth once daily.    lisinopriL (PRINIVIL,ZESTRIL) 40 MG tablet Take 1 tablet (40 mg total) by mouth once daily.     metOLazone (ZAROXOLYN) 2.5 MG tablet Take 2 tablets (5 mg total) by mouth once daily. for 4 days    multivit-min/ferrous fumarate (MULTI VITAMIN ORAL) Take by mouth.    pantoprazole (PROTONIX) 40 MG tablet Take 1 tablet (40 mg total) by mouth once daily.    potassium 99 mg Tab Take 1 tablet by mouth once daily.     Family History       Problem Relation (Age of Onset)    Breast cancer Mother    Stroke Father          Tobacco Use    Smoking status: Never    Smokeless tobacco: Never   Substance and Sexual Activity    Alcohol use: Never    Drug use: Never    Sexual activity: Not Currently     Review of Systems   Constitutional:  Negative for activity change, chills and fever.   HENT:  Negative for sinus pressure and sinus pain.         Dental implant surgery yesterday.   Eyes:         Recent cataract surgery.    Respiratory: Negative.     Cardiovascular: Negative.    Gastrointestinal:         See HPI   Endocrine: Negative.    Genitourinary:  Negative for dysuria and hematuria.        Hx of UTIs   Musculoskeletal:  Positive for arthralgias. Negative for myalgias.   Skin:  Negative for color change and pallor.   Neurological:  Negative for dizziness and syncope.   Hematological:  Negative for adenopathy. Does not bruise/bleed easily.   Psychiatric/Behavioral:  Negative for behavioral problems and confusion.      Objective:     Vital Signs (Most Recent):  Temp: 98.4 °F (36.9 °C) (10/14/23 0454)  Pulse: 82 (10/14/23 0634)  Resp: 14 (10/14/23 0634)  BP: (!) 125/53 (10/14/23 0634)  SpO2: 96 % (10/14/23 0634) Vital Signs (24h Range):  Temp:  [98.4 °F (36.9 °C)] 98.4 °F (36.9 °C)  Pulse:  [] 82  Resp:  [14-18] 14  SpO2:  [95 %-97 %] 96 %  BP: (107-134)/(45-65) 125/53     Weight: 61.7 kg (136 lb 0.4 oz)  Body mass index is 23.35 kg/m².     Physical Exam  Vitals reviewed.   Constitutional:       General: She is not in acute distress.     Appearance: She is not ill-appearing or toxic-appearing.   HENT:      Head:  Normocephalic and atraumatic.   Eyes:      General: No scleral icterus.     Extraocular Movements: Extraocular movements intact.      Pupils: Pupils are equal, round, and reactive to light.   Cardiovascular:      Rate and Rhythm: Normal rate and regular rhythm.      Heart sounds: Normal heart sounds.   Pulmonary:      Effort: Pulmonary effort is normal. No respiratory distress.      Breath sounds: Normal breath sounds. No wheezing.   Abdominal:      General: Abdomen is flat. Bowel sounds are normal. There is no distension.      Palpations: Abdomen is soft.      Tenderness: There is no abdominal tenderness.   Genitourinary:     Rectum: Guaiac result positive.      Comments: External hemorrhoids. Dark stool  Skin:     General: Skin is warm and dry.      Capillary Refill: Capillary refill takes less than 2 seconds.   Neurological:      General: No focal deficit present.      Mental Status: She is alert and oriented to person, place, and time.   Psychiatric:         Mood and Affect: Mood normal.         Behavior: Behavior normal.              CRANIAL NERVES     CN III, IV, VI   Pupils are equal, round, and reactive to light.       Significant Labs: All pertinent labs within the past 24 hours have been reviewed.  BMP:   Recent Labs   Lab 10/14/23  0519   *      K 4.5      CO2 30   BUN 45*   CREATININE 1.05*   CALCIUM 8.3*     CBC:   Recent Labs   Lab 10/14/23  0519   WBC 7.37   HGB 9.8*   HCT 29.1*          Significant Imaging: I have reviewed all pertinent imaging results/findings within the past 24 hours.    Assessment/Plan:     * Upper GI bleeding    Dark stool on my exam, Pt on asa.  Some historical  disconnect. Reportedly some bright red blood and clots.  BUN/cr ratio 40 more supportive of upper GI.    Serial H/H. Protonix infusion, GI consultation.       Essential hypertension    Holding antihypertensives for now.       VTE Risk Mitigation (From admission, onward)         Ordered     IP  VTE HIGH RISK PATIENT  Once         10/14/23 1051     Place sequential compression device  Until discontinued         10/14/23 1051     Reason for No Pharmacological VTE Prophylaxis  Once        Question:  Reasons:  Answer:  Active Bleeding    10/14/23 1051     Place sequential compression device  Until discontinued         10/14/23 0911                           Adam Alfred Jr, MD  Department of Hospital Medicine  [facility]

## 2023-10-14 NOTE — ASSESSMENT & PLAN NOTE
Dark stool on my exam, Pt on asa.  Some historical  disconnect. Reportedly some bright red blood and clots.  BUN/cr ratio 40 more supportive of upper GI.    Serial H/H. Protonix infusion, GI consultation.

## 2023-10-14 NOTE — ED PROVIDER NOTES
Encounter Date: 10/14/2023       History     Chief Complaint   Patient presents with    Diarrhea     [facility]      Patient is a 89-year-old female who is not a very good historian.  Patient presents to the emergency department stating that she has had some diarrhea for the last few days and that last night she began seeing blood in her diarrhea.  Patient denies abdominal pain, fever, nausea, vomiting, weakness, or other acute symptoms or complaints.      Review of patient's allergies indicates:   Allergen Reactions    Clonidine     Hiprex [methenamine hippurate]     Sulfa (sulfonamide antibiotics)      Past Medical History:   Diagnosis Date    Abnormal EKG     Essential hypertension     Hyperlipidemia     Shortness of breath      Past Surgical History:   Procedure Laterality Date    CHOLECYSTECTOMY      EYE SURGERY      cataract removal    HYSTERECTOMY       Family History   Problem Relation Age of Onset    Breast cancer Mother     Stroke Father      Social History     Tobacco Use    Smoking status: Never    Smokeless tobacco: Never   Substance Use Topics    Alcohol use: Never    Drug use: Never     Review of Systems   Gastrointestinal:  Positive for blood in stool and diarrhea.   All other systems reviewed and are negative.      Physical Exam     Initial Vitals   BP Pulse Resp Temp SpO2   10/14/23 0453 10/14/23 0453 10/14/23 0454 10/14/23 0454 10/14/23 0453   134/64 101 17 98.4 °F (36.9 °C) 97 %      MAP       --                Physical Exam    Nursing note and vitals reviewed.  Constitutional: She appears well-developed and well-nourished.   HENT:   Head: Normocephalic.   Eyes: Pupils are equal, round, and reactive to light.   Cardiovascular:  Normal rate.           Pulmonary/Chest: Breath sounds normal.   Abdominal: Abdomen is soft.   Musculoskeletal:         General: Normal range of motion.     Neurological: She is alert.   Skin: Skin is warm. Capillary refill takes less than 2 seconds.   Psychiatric:  She has a normal mood and affect.         Medical Screening Exam   See Full Note    ED Course   Procedures  Labs Reviewed   COMPREHENSIVE METABOLIC PANEL - Abnormal; Notable for the following components:       Result Value    Glucose 142 (*)     BUN 45 (*)     Creatinine 1.05 (*)     BUN/Creatinine Ratio 43 (*)     Calcium 8.3 (*)     Total Protein 6.0 (*)     Albumin 3.0 (*)     eGFR 51 (*)     All other components within normal limits   CBC WITH DIFFERENTIAL - Abnormal; Notable for the following components:    RBC 3.19 (*)     Hemoglobin 9.8 (*)     Hematocrit 29.1 (*)     RDW 14.9 (*)     Neutrophils % 67.8 (*)     Lymphocytes % 22.4 (*)     Monocytes % 9.0 (*)     Eosinophils % 0.1 (*)     All other components within normal limits   POCT OCCULT BLOOD (STOOL) - Abnormal; Notable for the following components:    Fecal Occult Blood Positive (*)     All other components within normal limits   LIPASE - Normal   PROTIME-INR - Normal   CBC W/ AUTO DIFFERENTIAL    Narrative:     The following orders were created for panel order CBC auto differential.  Procedure                               Abnormality         Status                     ---------                               -----------         ------                     CBC with Differential[[id number]]       Abnormal            Final result                 Please view results for these tests on the individual orders.   TYPE & SCREEN          Imaging Results    None          Medications   polyethylene glycol (GoLYTELY) solution (4,000 mLs Oral Given 10/14/23 1400)     Medical Decision Making  Amount and/or Complexity of Data Reviewed  Labs: ordered.    Risk  Decision regarding hospitalization.               ED Course as of 10/27/23 0807   Sat Oct 14, 2023   0500 Medical decision-making:  Differential diagnosis includes diarrhea, gastroenteritis, viral gastroenteritis, bacterial enteritis, diverticulitis, lower GI bleed.  All labs and imaging ordered and interpreted by  me.  Rectal exam reveals maroon stool which is Hemoccult positive. [BB]      ED Course User Index  [BB] Marcin Hrovath MD                    Clinical Impression:   Final diagnoses:  [K92.2] Upper GI bleeding (Primary)  [R07.9] Chest pain        ED Disposition Condition    Admit Stable                Marcin Horvath MD  10/27/23 0807

## 2023-10-14 NOTE — SUBJECTIVE & OBJECTIVE
Past Medical History:   Diagnosis Date    Abnormal EKG     Essential hypertension     Hyperlipidemia     Shortness of breath        History reviewed. No pertinent surgical history.    Review of patient's allergies indicates:   Allergen Reactions    Clonidine     Hiprex [methenamine hippurate]     Sulfa (sulfonamide antibiotics)        No current facility-administered medications on file prior to encounter.     Current Outpatient Medications on File Prior to Encounter   Medication Sig    acetaminophen (TYLENOL EXTRA STRENGTH ORAL) Take by mouth.    amLODIPine (NORVASC) 2.5 MG tablet Take 1 tablet (2.5 mg total) by mouth once daily.    aspirin (ECOTRIN) 81 MG EC tablet Take 81 mg by mouth once daily.    calcium carbonate (OS-NASREEN) 600 mg calcium (1,500 mg) Tab Take 600 mg by mouth once daily. Take 1 tablet po BID    furosemide (LASIX) 20 MG tablet Take 1 tablet (20 mg total) by mouth once daily.    lisinopriL (PRINIVIL,ZESTRIL) 40 MG tablet Take 1 tablet (40 mg total) by mouth once daily.    metOLazone (ZAROXOLYN) 2.5 MG tablet Take 2 tablets (5 mg total) by mouth once daily. for 4 days    multivit-min/ferrous fumarate (MULTI VITAMIN ORAL) Take by mouth.    pantoprazole (PROTONIX) 40 MG tablet Take 1 tablet (40 mg total) by mouth once daily.    potassium 99 mg Tab Take 1 tablet by mouth once daily.     Family History       Problem Relation (Age of Onset)    Breast cancer Mother    Stroke Father          Tobacco Use    Smoking status: Never    Smokeless tobacco: Never   Substance and Sexual Activity    Alcohol use: Never    Drug use: Never    Sexual activity: Not Currently     Review of Systems   Constitutional:  Negative for activity change, chills and fever.   HENT:  Negative for sinus pressure and sinus pain.         Dental implant surgery yesterday.   Eyes:         Recent cataract surgery.    Respiratory: Negative.     Cardiovascular: Negative.    Gastrointestinal:         See HPI   Endocrine: Negative.     Genitourinary:  Negative for dysuria and hematuria.        Hx of UTIs   Musculoskeletal:  Positive for arthralgias. Negative for myalgias.   Skin:  Negative for color change and pallor.   Neurological:  Negative for dizziness and syncope.   Hematological:  Negative for adenopathy. Does not bruise/bleed easily.   Psychiatric/Behavioral:  Negative for behavioral problems and confusion.      Objective:     Vital Signs (Most Recent):  Temp: 98.4 °F (36.9 °C) (10/14/23 0454)  Pulse: 82 (10/14/23 0634)  Resp: 14 (10/14/23 0634)  BP: (!) 125/53 (10/14/23 0634)  SpO2: 96 % (10/14/23 0634) Vital Signs (24h Range):  Temp:  [98.4 °F (36.9 °C)] 98.4 °F (36.9 °C)  Pulse:  [] 82  Resp:  [14-18] 14  SpO2:  [95 %-97 %] 96 %  BP: (107-134)/(45-65) 125/53     Weight: 61.7 kg (136 lb 0.4 oz)  Body mass index is 23.35 kg/m².     Physical Exam  Vitals reviewed.   Constitutional:       General: She is not in acute distress.     Appearance: She is not ill-appearing or toxic-appearing.   HENT:      Head: Normocephalic and atraumatic.   Eyes:      General: No scleral icterus.     Extraocular Movements: Extraocular movements intact.      Pupils: Pupils are equal, round, and reactive to light.   Cardiovascular:      Rate and Rhythm: Normal rate and regular rhythm.      Heart sounds: Normal heart sounds.   Pulmonary:      Effort: Pulmonary effort is normal. No respiratory distress.      Breath sounds: Normal breath sounds. No wheezing.   Abdominal:      General: Abdomen is flat. Bowel sounds are normal. There is no distension.      Palpations: Abdomen is soft.      Tenderness: There is no abdominal tenderness.   Genitourinary:     Rectum: Guaiac result positive.      Comments: External hemorrhoids. Dark stool  Skin:     General: Skin is warm and dry.      Capillary Refill: Capillary refill takes less than 2 seconds.   Neurological:      General: No focal deficit present.      Mental Status: She is alert and oriented to person, place,  and time.   Psychiatric:         Mood and Affect: Mood normal.         Behavior: Behavior normal.              CRANIAL NERVES     CN III, IV, VI   Pupils are equal, round, and reactive to light.       Significant Labs: All pertinent labs within the past 24 hours have been reviewed.  BMP:   Recent Labs   Lab 10/14/23  0519   *      K 4.5      CO2 30   BUN 45*   CREATININE 1.05*   CALCIUM 8.3*     CBC:   Recent Labs   Lab 10/14/23  0519   WBC 7.37   HGB 9.8*   HCT 29.1*          Significant Imaging: I have reviewed all pertinent imaging results/findings within the past 24 hours.

## 2023-10-14 NOTE — CONSULTS
"[facility]  Gastroenterology  Consult Note    Patient Name: Purnima Cardona  MRN: [id number]  Admission Date: 10/14/2023  Hospital Length of Stay: 0 days  Code Status: Full Code   Attending Provider: Adam Alfred Jr., MD   Consulting Provider: RICHI Lara MD  Primary Care Physician: Jia Montero FNP  Principal Problem:Acute GI bleeding    Consults  Subjective:     HPI:  88 yo female with HTN and hyperlipidemia presents with c/o episode of maroon hematochezia earlier this AM. She states there were large clots associated with this. In the ED she was hemodynamically normal with Hgb 9.8, down from 12 range a year ago. She denies abdominal pain or nausea. She has no hx of prior GI bleeding "other than hemorrhoids" she says and has never had endoscopy in the past. Denies dysphagia, ABN BM's in past or weight loss.      Past Medical History:   Diagnosis Date    Abnormal EKG     Essential hypertension     Hyperlipidemia     Shortness of breath        History reviewed. No pertinent surgical history.    Review of patient's allergies indicates:   Allergen Reactions    Clonidine     Hiprex [methenamine hippurate]     Sulfa (sulfonamide antibiotics)      Family History       Problem Relation (Age of Onset)    Breast cancer Mother    Stroke Father          Tobacco Use    Smoking status: Never    Smokeless tobacco: Never   Substance and Sexual Activity    Alcohol use: Never    Drug use: Never    Sexual activity: Not Currently     Review of Systems   Constitutional:  Negative for chills, fever and unexpected weight change.   HENT:  Negative for sore throat and trouble swallowing.    Respiratory:  Negative for cough and shortness of breath.    Cardiovascular:  Negative for chest pain.   Gastrointestinal:  Negative for abdominal distention, abdominal pain, nausea and vomiting.   Genitourinary:  Negative for dysuria, flank pain and hematuria.   Neurological:  Negative for syncope, speech " difficulty and weakness.   Psychiatric/Behavioral:  Negative for behavioral problems and confusion.      Objective:     Vital Signs (Most Recent):  Temp: 98.4 °F (36.9 °C) (10/14/23 0454)  Pulse: 82 (10/14/23 0634)  Resp: 14 (10/14/23 0634)  BP: (!) 125/53 (10/14/23 0634)  SpO2: 96 % (10/14/23 0634) Vital Signs (24h Range):  Temp:  [98.4 °F (36.9 °C)] 98.4 °F (36.9 °C)  Pulse:  [] 82  Resp:  [14-18] 14  SpO2:  [95 %-97 %] 96 %  BP: (107-134)/(45-65) 125/53     Weight: 61.7 kg (136 lb 0.4 oz) (10/14/23 0455)  Body mass index is 23.35 kg/m².    No intake or output data in the 24 hours ending 10/14/23 1142    Lines/Drains/Airways       Peripheral Intravenous Line  Duration                  Peripheral IV - Single Lumen 10/14/23 0520 18 G Anterior;Right Forearm <1 day                     Physical Exam  Vitals and nursing note reviewed.   Constitutional:       General: She is not in acute distress.     Appearance: She is not ill-appearing or diaphoretic.   HENT:      Head: Normocephalic and atraumatic.      Mouth/Throat:      Pharynx: Oropharynx is clear.   Eyes:      General: No scleral icterus.     Extraocular Movements: Extraocular movements intact.   Cardiovascular:      Rate and Rhythm: Normal rate and regular rhythm.      Pulses: Normal pulses.      Heart sounds: Normal heart sounds.   Pulmonary:      Effort: Pulmonary effort is normal. No respiratory distress.      Breath sounds: Normal breath sounds.   Abdominal:      General: Bowel sounds are normal. There is no distension.      Palpations: Abdomen is soft.      Tenderness: There is no abdominal tenderness.   Musculoskeletal:         General: No swelling or tenderness.   Skin:     General: Skin is warm and dry.      Coloration: Skin is not jaundiced.   Neurological:      General: No focal deficit present.      Mental Status: She is alert and oriented to person, place, and time.      Cranial Nerves: No cranial nerve deficit.      Sensory: No sensory deficit.    Psychiatric:         Mood and Affect: Mood normal.         Behavior: Behavior normal.          Significant Labs:  Recent Lab Results         10/14/23  0519   10/14/23  0507        Albumin/Globulin Ratio 1.0         Albumin 3.0         ALP 66         ALT 20         Anion Gap 9         AST 17         Baso # 0.03         Basophil % 0.4         BILIRUBIN TOTAL 0.6         BUN 45         BUN/CREAT RATIO 43         Calcium 8.3         Chloride 106         CO2 30         Creatinine 1.05         Differential Method Auto         eGFR 51         Eos # 0.01         Eosinophil % 0.1         Fecal Occult Blood   Positive       Globulin, Total 3.0         Glucose 142         Group & Rh A NEG         Hematocrit 29.1         Hemoglobin 9.8         Immature Grans (Abs) 0.02         Immature Granulocytes 0.3         INDIRECT ANDREA NEG         INR 1.09         Lipase <19         Lymph # 1.65         Lymph % 22.4         MCH 30.7         MCHC 33.7         MCV 91.2         Mono # 0.66         Mono % 9.0         MPV 11.3         Neutrophils, Abs 5.00         Neutrophils Relative 67.8         nRBC 0.0         NUCLEATED RBC ABSOLUTE 0.00         Platelet Count 190         Potassium 4.5         PROTEIN TOTAL 6.0         Protime 14.0         RBC 3.19         RDW 14.9         Sodium 140         Specimen Outdate 10/17/2023 23:59         WBC 7.37                 Significant Imaging:  Imaging results within the past 24 hours have been reviewed.    Assessment/Plan:     GI  * Acute GI bleeding  Pt presents with painless hematochezia. She appears stable currently with no further episodes hematochezia in 6+ hours. Her BUN/creat ratio is elevated at 43 but this appears to be likely lower GI bleed. Agree with plan for observation with serial hemoglobins and blood product support as needed. She agrees to plan for colonoscopy tomorrow.         Thank you for your consult. I will follow-up with patient. Please contact us if you have any additional  questions.    W Arnol Lara MD  Gastroenterology  [facility]

## 2023-10-15 ENCOUNTER — ANESTHESIA (OUTPATIENT)
Dept: GASTROENTEROLOGY | Facility: HOSPITAL | Age: 88
DRG: 379 | End: 2023-10-15
Payer: MEDICARE

## 2023-10-15 ENCOUNTER — ANESTHESIA EVENT (OUTPATIENT)
Dept: GASTROENTEROLOGY | Facility: HOSPITAL | Age: 88
DRG: 379 | End: 2023-10-15
Payer: MEDICARE

## 2023-10-15 LAB
ANION GAP SERPL CALCULATED.3IONS-SCNC: 9 MMOL/L (ref 7–16)
BASOPHILS # BLD AUTO: 0.03 K/UL (ref 0–0.2)
BASOPHILS NFR BLD AUTO: 0.5 % (ref 0–1)
BUN SERPL-MCNC: 29 MG/DL (ref 7–18)
BUN/CREAT SERPL: 41 (ref 6–20)
CALCIUM SERPL-MCNC: 8.1 MG/DL (ref 8.5–10.1)
CHLORIDE SERPL-SCNC: 107 MMOL/L (ref 98–107)
CO2 SERPL-SCNC: 30 MMOL/L (ref 21–32)
CREAT SERPL-MCNC: 0.71 MG/DL (ref 0.55–1.02)
DIFFERENTIAL METHOD BLD: ABNORMAL
EGFR (NO RACE VARIABLE) (RUSH/TITUS): 81 ML/MIN/1.73M2
EOSINOPHIL # BLD AUTO: 0.05 K/UL (ref 0–0.5)
EOSINOPHIL NFR BLD AUTO: 0.8 % (ref 1–4)
ERYTHROCYTE [DISTWIDTH] IN BLOOD BY AUTOMATED COUNT: 14.3 % (ref 11.5–14.5)
GLUCOSE SERPL-MCNC: 103 MG/DL (ref 74–106)
HCT VFR BLD AUTO: 25.5 % (ref 38–47)
HCT VFR BLD AUTO: 26 % (ref 38–47)
HCT VFR BLD AUTO: 26.6 % (ref 38–47)
HGB BLD-MCNC: 8.5 G/DL (ref 12–16)
HGB BLD-MCNC: 8.6 G/DL (ref 12–16)
HGB BLD-MCNC: 8.7 G/DL (ref 12–16)
IMM GRANULOCYTES # BLD AUTO: 0.02 K/UL (ref 0–0.04)
IMM GRANULOCYTES NFR BLD: 0.3 % (ref 0–0.4)
LYMPHOCYTES # BLD AUTO: 1.96 K/UL (ref 1–4.8)
LYMPHOCYTES NFR BLD AUTO: 32.2 % (ref 27–41)
MCH RBC QN AUTO: 30.6 PG (ref 27–31)
MCHC RBC AUTO-ENTMCNC: 33.3 G/DL (ref 32–36)
MCV RBC AUTO: 91.7 FL (ref 80–96)
MONOCYTES # BLD AUTO: 0.55 K/UL (ref 0–0.8)
MONOCYTES NFR BLD AUTO: 9 % (ref 2–6)
MPC BLD CALC-MCNC: 11 FL (ref 9.4–12.4)
NEUTROPHILS # BLD AUTO: 3.48 K/UL (ref 1.8–7.7)
NEUTROPHILS NFR BLD AUTO: 57.2 % (ref 53–65)
NRBC # BLD AUTO: 0 X10E3/UL
NRBC, AUTO (.00): 0 %
PLATELET # BLD AUTO: 156 K/UL (ref 150–400)
POTASSIUM SERPL-SCNC: 3.8 MMOL/L (ref 3.5–5.1)
RBC # BLD AUTO: 2.78 M/UL (ref 4.2–5.4)
SODIUM SERPL-SCNC: 142 MMOL/L (ref 136–145)
WBC # BLD AUTO: 6.09 K/UL (ref 4.5–11)

## 2023-10-15 PROCEDURE — D9220A PRA ANESTHESIA: Mod: ANES,,, | Performed by: ANESTHESIOLOGY

## 2023-10-15 PROCEDURE — 85014 HEMATOCRIT: CPT | Performed by: FAMILY MEDICINE

## 2023-10-15 PROCEDURE — 63600175 PHARM REV CODE 636 W HCPCS: Performed by: FAMILY MEDICINE

## 2023-10-15 PROCEDURE — 45378 DIAGNOSTIC COLONOSCOPY: CPT

## 2023-10-15 PROCEDURE — C9113 INJ PANTOPRAZOLE SODIUM, VIA: HCPCS | Performed by: FAMILY MEDICINE

## 2023-10-15 PROCEDURE — D9220A PRA ANESTHESIA: ICD-10-PCS | Mod: CRNA,,, | Performed by: NURSE ANESTHETIST, CERTIFIED REGISTERED

## 2023-10-15 PROCEDURE — 94761 N-INVAS EAR/PLS OXIMETRY MLT: CPT

## 2023-10-15 PROCEDURE — 11000001 HC ACUTE MED/SURG PRIVATE ROOM

## 2023-10-15 PROCEDURE — 45378 DIAGNOSTIC COLONOSCOPY: CPT | Performed by: INTERNAL MEDICINE

## 2023-10-15 PROCEDURE — D9220A PRA ANESTHESIA: ICD-10-PCS | Mod: ANES,,, | Performed by: ANESTHESIOLOGY

## 2023-10-15 PROCEDURE — 80048 BASIC METABOLIC PNL TOTAL CA: CPT | Performed by: FAMILY MEDICINE

## 2023-10-15 PROCEDURE — D9220A PRA ANESTHESIA: Mod: CRNA,,, | Performed by: NURSE ANESTHETIST, CERTIFIED REGISTERED

## 2023-10-15 PROCEDURE — 25000003 PHARM REV CODE 250: Performed by: FAMILY MEDICINE

## 2023-10-15 PROCEDURE — 99233 SBSQ HOSP IP/OBS HIGH 50: CPT | Mod: ,,, | Performed by: STUDENT IN AN ORGANIZED HEALTH CARE EDUCATION/TRAINING PROGRAM

## 2023-10-15 PROCEDURE — 85014 HEMATOCRIT: CPT | Performed by: STUDENT IN AN ORGANIZED HEALTH CARE EDUCATION/TRAINING PROGRAM

## 2023-10-15 PROCEDURE — 25000003 PHARM REV CODE 250: Performed by: NURSE ANESTHETIST, CERTIFIED REGISTERED

## 2023-10-15 PROCEDURE — 63600175 PHARM REV CODE 636 W HCPCS: Performed by: NURSE ANESTHETIST, CERTIFIED REGISTERED

## 2023-10-15 PROCEDURE — 85025 COMPLETE CBC W/AUTO DIFF WBC: CPT | Performed by: FAMILY MEDICINE

## 2023-10-15 PROCEDURE — 25000003 PHARM REV CODE 250: Performed by: STUDENT IN AN ORGANIZED HEALTH CARE EDUCATION/TRAINING PROGRAM

## 2023-10-15 PROCEDURE — 99233 PR SUBSEQUENT HOSPITAL CARE,LEVL III: ICD-10-PCS | Mod: ,,, | Performed by: STUDENT IN AN ORGANIZED HEALTH CARE EDUCATION/TRAINING PROGRAM

## 2023-10-15 RX ORDER — PROPOFOL 10 MG/ML
VIAL (ML) INTRAVENOUS
Status: DISCONTINUED | OUTPATIENT
Start: 2023-10-15 | End: 2023-10-15

## 2023-10-15 RX ORDER — LIDOCAINE HYDROCHLORIDE 20 MG/ML
INJECTION INTRAVENOUS
Status: DISCONTINUED | OUTPATIENT
Start: 2023-10-15 | End: 2023-10-15

## 2023-10-15 RX ORDER — PANTOPRAZOLE SODIUM 40 MG/1
40 TABLET, DELAYED RELEASE ORAL DAILY
Status: DISCONTINUED | OUTPATIENT
Start: 2023-10-15 | End: 2023-10-16 | Stop reason: HOSPADM

## 2023-10-15 RX ORDER — ONDANSETRON 2 MG/ML
4 INJECTION INTRAMUSCULAR; INTRAVENOUS DAILY PRN
Status: DISCONTINUED | OUTPATIENT
Start: 2023-10-15 | End: 2023-10-16 | Stop reason: HOSPADM

## 2023-10-15 RX ORDER — DIPHENHYDRAMINE HYDROCHLORIDE 50 MG/ML
25 INJECTION INTRAMUSCULAR; INTRAVENOUS EVERY 6 HOURS PRN
Status: DISCONTINUED | OUTPATIENT
Start: 2023-10-15 | End: 2023-10-16 | Stop reason: HOSPADM

## 2023-10-15 RX ADMIN — PANTOPRAZOLE SODIUM 40 MG: 40 TABLET, DELAYED RELEASE ORAL at 02:10

## 2023-10-15 RX ADMIN — PROPOFOL 50 MG: 10 INJECTION, EMULSION INTRAVENOUS at 10:10

## 2023-10-15 RX ADMIN — LIDOCAINE HYDROCHLORIDE 50 MG: 20 INJECTION, SOLUTION INTRAVENOUS at 09:10

## 2023-10-15 RX ADMIN — SODIUM CHLORIDE: 9 INJECTION, SOLUTION INTRAVENOUS at 09:10

## 2023-10-15 RX ADMIN — SODIUM CHLORIDE, POTASSIUM CHLORIDE, SODIUM LACTATE AND CALCIUM CHLORIDE: 600; 310; 30; 20 INJECTION, SOLUTION INTRAVENOUS at 04:10

## 2023-10-15 RX ADMIN — PROPOFOL 50 MG: 10 INJECTION, EMULSION INTRAVENOUS at 09:10

## 2023-10-15 RX ADMIN — ACETAMINOPHEN 650 MG: 325 TABLET ORAL at 02:10

## 2023-10-15 RX ADMIN — PANTOPRAZOLE SODIUM 8 MG/HR: 40 INJECTION, POWDER, FOR SOLUTION INTRAVENOUS at 04:10

## 2023-10-15 RX ADMIN — PANTOPRAZOLE SODIUM 8 MG/HR: 40 INJECTION, POWDER, FOR SOLUTION INTRAVENOUS at 01:10

## 2023-10-15 RX ADMIN — PANTOPRAZOLE SODIUM 8 MG/HR: 40 INJECTION, POWDER, FOR SOLUTION INTRAVENOUS at 12:10

## 2023-10-15 NOTE — HOSPITAL COURSE
10/15-chart reviewed, hemorrhoids and diverticuli, no active bleeding. Hb stable    10/16-Hb stable, stable for DC. Follow up with PCP.  Follow up with ENT for ear fullness

## 2023-10-15 NOTE — PROGRESS NOTES
[facility]  [place] [facility] Medicine  Progress Note    Patient Name: Purnima Cardona  MRN: [id number]  Patient Class: IP- Inpatient   Admission Date: 10/14/2023  Length of Stay: 1 days  Attending Physician: Muaricio Fournier DO  Primary Care Provider: Jia Montero FNP        Subjective:     Principal Problem:Acute GI bleeding        HPI:  Patient is an 88 y/o WF resident at the [facility] that reports several days of diarrhea.  At about 4am had bowel movement with a great deal of blood and clots.  Patient thinks bright red.  Report to ER was more maroon.  Stool heme positive in ER.  Patient does report intermittent slight bleeding in the past she attributes to hemorrhoids.  Nothing like this however.  She denies N/V abdominal pain. No fever or chills.       Overview/Hospital Course:  10/15-chart reviewed, hemorrhoids and diverticuli, no active bleeding. Hb stable      Interval History: naeo    Review of Systems   Constitutional:  Negative for chills, fatigue, fever and unexpected weight change.   HENT:  Negative for congestion, mouth sores and sore throat.    Eyes:  Negative for photophobia and visual disturbance.   Respiratory:  Negative for cough, chest tightness, shortness of breath and wheezing.    Cardiovascular:  Negative for chest pain, palpitations and leg swelling.   Gastrointestinal:  Negative for abdominal pain, diarrhea, nausea and vomiting.   Endocrine: Negative for cold intolerance and heat intolerance.   Genitourinary:  Negative for difficulty urinating, dysuria, frequency and urgency.   Musculoskeletal:  Negative for arthralgias, back pain and myalgias.   Skin:  Negative for pallor and rash.   Neurological:  Negative for tremors, seizures, syncope, weakness, numbness and headaches.   Hematological:  Does not bruise/bleed easily.   Psychiatric/Behavioral:  Negative for agitation, confusion, hallucinations and suicidal ideas.      Objective:     Vital Signs (Most Recent):  Temp: 98 °F  (36.7 °C) (10/15/23 1002)  Pulse: 78 (10/15/23 1032)  Resp: (!) 22 (10/15/23 1032)  BP: (!) 150/69 (10/15/23 1032)  SpO2: 96 % (10/15/23 1032) Vital Signs (24h Range):  Temp:  [98 °F (36.7 °C)-98.7 °F (37.1 °C)] 98 °F (36.7 °C)  Pulse:  [78-95] 78  Resp:  [11-25] 22  SpO2:  [92 %-100 %] 96 %  BP: ()/(46-91) 150/69     Weight: 69.9 kg (154 lb)  Body mass index is 28.17 kg/m².    Intake/Output Summary (Last 24 hours) at 10/15/2023 1336  Last data filed at 10/15/2023 1014  Gross per 24 hour   Intake 1600 ml   Output --   Net 1600 ml         Physical Exam  Vitals reviewed.   Constitutional:       Appearance: Normal appearance.   HENT:      Head: Normocephalic and atraumatic.   Eyes:      Pupils: Pupils are equal, round, and reactive to light.   Cardiovascular:      Rate and Rhythm: Normal rate and regular rhythm.      Pulses: Normal pulses.   Pulmonary:      Effort: Pulmonary effort is normal.      Breath sounds: Normal breath sounds.   Abdominal:      General: Abdomen is flat.      Palpations: Abdomen is soft.   Skin:     General: Skin is warm and dry.      Capillary Refill: Capillary refill takes less than 2 seconds.   Neurological:      Mental Status: She is alert. Mental status is at baseline.   Psychiatric:         Mood and Affect: Mood normal.         Behavior: Behavior normal.             Significant Labs: All pertinent labs within the past 24 hours have been reviewed.    Significant Imaging: I have reviewed all pertinent imaging results/findings within the past 24 hours.      Assessment/Plan:      * Acute GI bleeding    Dark stool on my exam, Pt on asa.  Some historical  disconnect. Reportedly some bright red blood and clots.  BUN/cr ratio 40 more supportive of upper GI.    Serial H/H. Protonix infusion, GI consultation.       resolved    Essential hypertension    Adjust meds as needed      VTE Risk Mitigation (From admission, onward)         Ordered     IP VTE HIGH RISK PATIENT  Once         10/14/23 1051      Place sequential compression device  Until discontinued         10/14/23 1051     Reason for No Pharmacological VTE Prophylaxis  Once        Question:  Reasons:  Answer:  Active Bleeding    10/14/23 1051     Place sequential compression device  Until discontinued         10/14/23 0911                Discharge Planning   MADHURI:      Code Status: Full Code   Is the patient medically ready for discharge?:     Reason for patient still in hospital (select all that apply): Treatment                     Mauricio Fournier DO  Department of Hospital Medicine   [facility]

## 2023-10-15 NOTE — PLAN OF CARE
Problem: Adult Inpatient Plan of Care  Goal: Readiness for Transition of Care  Outcome: Ongoing, Progressing     Problem: Fall Injury Risk  Goal: Absence of Fall and Fall-Related Injury  Outcome: Ongoing, Progressing

## 2023-10-15 NOTE — DISCHARGE INSTRUCTIONS
Procedure Date  10/15/23     Impression  Overall Impression:   Diverticulosis of moderate severity containing stool in the descending colon and sigmoid colon  3 medium hemorrhoids  The cecum, ascending colon and transverse colon appeared normal.        -      Poor bowel prep     Recommendation    Follow up with PCP      Advance diet as tolerated.   Could probably discharge tomorrow from GI standpoint if no further bleeding noted

## 2023-10-15 NOTE — ASSESSMENT & PLAN NOTE
Dark stool on my exam, Pt on asa.  Some historical  disconnect. Reportedly some bright red blood and clots.  BUN/cr ratio 40 more supportive of upper GI.    Serial H/H. Protonix infusion, GI consultation.       resolved

## 2023-10-15 NOTE — ANESTHESIA POSTPROCEDURE EVALUATION
Anesthesia Post Evaluation    Patient: Purnima Cardona    Procedure(s) Performed: * No procedures listed *    Final Anesthesia Type: MAC      Patient location during evaluation: PACU  Patient participation: Yes- Able to Participate  Level of consciousness: awake and alert and oriented  Post-procedure vital signs: reviewed and stable  Pain management: adequate  Airway patency: patent  RAMYA mitigation strategies: Multimodal analgesia  PONV status at discharge: No PONV  Anesthetic complications: no      Cardiovascular status: hemodynamically stable  Respiratory status: unassisted and spontaneous ventilation  Hydration status: euvolemic  Follow-up needed           Vitals Value Taken Time   /54 10/15/23 1007   Temp 36.7 °C (98 °F) 10/15/23 1002   Pulse 81 10/15/23 1009   Resp 14 10/15/23 1009   SpO2 100 % 10/15/23 1009   Vitals shown include unvalidated device data.      No case tracking events are documented in the log.      Pain/Danette Score: Pain Rating Prior to Med Admin: 3 (10/14/2023  8:09 PM)  Pain Rating Post Med Admin: 0 (10/14/2023  9:09 PM)  Danette Score: 9 (10/15/2023 10:06 AM)

## 2023-10-15 NOTE — PLAN OF CARE
Problem: Adult Inpatient Plan of Care  Goal: Plan of Care Review  10/15/2023 0230 by Bethany Sharp, RN  Outcome: Ongoing, Progressing  10/15/2023 0229 by Bethany Sharp, RN  Outcome: Ongoing, Progressing     Problem: Fall Injury Risk  Goal: Absence of Fall and Fall-Related Injury  Outcome: Ongoing, Progressing  Intervention: Promote Injury-Free Environment  Flowsheets (Taken 10/15/2023 0230)  Safety Promotion/Fall Prevention:   assistive device/personal item within reach   bed alarm set   commode/urinal/bedpan at bedside   Fall Risk signage in place   Fall Risk reviewed with patient/family   medications reviewed   nonskid shoes/socks when out of bed   side rails raised x 2   instructed to call staff for mobility

## 2023-10-15 NOTE — SUBJECTIVE & OBJECTIVE
Interval History: naeo    Review of Systems   Constitutional:  Negative for chills, fatigue, fever and unexpected weight change.   HENT:  Negative for congestion, mouth sores and sore throat.    Eyes:  Negative for photophobia and visual disturbance.   Respiratory:  Negative for cough, chest tightness, shortness of breath and wheezing.    Cardiovascular:  Negative for chest pain, palpitations and leg swelling.   Gastrointestinal:  Negative for abdominal pain, diarrhea, nausea and vomiting.   Endocrine: Negative for cold intolerance and heat intolerance.   Genitourinary:  Negative for difficulty urinating, dysuria, frequency and urgency.   Musculoskeletal:  Negative for arthralgias, back pain and myalgias.   Skin:  Negative for pallor and rash.   Neurological:  Negative for tremors, seizures, syncope, weakness, numbness and headaches.   Hematological:  Does not bruise/bleed easily.   Psychiatric/Behavioral:  Negative for agitation, confusion, hallucinations and suicidal ideas.      Objective:     Vital Signs (Most Recent):  Temp: 98 °F (36.7 °C) (10/15/23 1002)  Pulse: 78 (10/15/23 1032)  Resp: (!) 22 (10/15/23 1032)  BP: (!) 150/69 (10/15/23 1032)  SpO2: 96 % (10/15/23 1032) Vital Signs (24h Range):  Temp:  [98 °F (36.7 °C)-98.7 °F (37.1 °C)] 98 °F (36.7 °C)  Pulse:  [78-95] 78  Resp:  [11-25] 22  SpO2:  [92 %-100 %] 96 %  BP: ()/(46-91) 150/69     Weight: 69.9 kg (154 lb)  Body mass index is 28.17 kg/m².    Intake/Output Summary (Last 24 hours) at 10/15/2023 1336  Last data filed at 10/15/2023 1014  Gross per 24 hour   Intake 1600 ml   Output --   Net 1600 ml         Physical Exam  Vitals reviewed.   Constitutional:       Appearance: Normal appearance.   HENT:      Head: Normocephalic and atraumatic.   Eyes:      Pupils: Pupils are equal, round, and reactive to light.   Cardiovascular:      Rate and Rhythm: Normal rate and regular rhythm.      Pulses: Normal pulses.   Pulmonary:      Effort: Pulmonary effort  is normal.      Breath sounds: Normal breath sounds.   Abdominal:      General: Abdomen is flat.      Palpations: Abdomen is soft.   Skin:     General: Skin is warm and dry.      Capillary Refill: Capillary refill takes less than 2 seconds.   Neurological:      Mental Status: She is alert. Mental status is at baseline.   Psychiatric:         Mood and Affect: Mood normal.         Behavior: Behavior normal.             Significant Labs: All pertinent labs within the past 24 hours have been reviewed.    Significant Imaging: I have reviewed all pertinent imaging results/findings within the past 24 hours.

## 2023-10-15 NOTE — TRANSFER OF CARE
"Anesthesia Transfer of Care Note    Patient: Purnima Cardona    Procedure(s) Performed: * No procedures listed *    Patient location: GI    Anesthesia Type: general    Transport from OR: Transported from OR on room air with adequate spontaneous ventilation. Continuous ECG monitoring in transport. Continuous SpO2 monitoring in transport    Post pain: adequate analgesia    Post assessment: no apparent anesthetic complications and tolerated procedure well    Post vital signs: stable    Level of consciousness: responds to stimulation    Nausea/Vomiting: no nausea/vomiting    Complications: none    Transfer of care protocol was followed      Last vitals:   Visit Vitals  BP (!) 123/59 (BP Location: Left arm, Patient Position: Lying)   Pulse 81   Temp 36.7 °C (98 °F)   Resp 16   Ht 5' 2" (1.575 m)   Wt 69.9 kg (154 lb)   SpO2 100%   Breastfeeding No   BMI 28.17 kg/m²     "

## 2023-10-15 NOTE — ANESTHESIA PREPROCEDURE EVALUATION
10/15/2023  Purnima Cardona is a 89 y.o., female.      Pre-op Assessment    I have reviewed the Patient Summary Reports.     I have reviewed the Nursing Notes. I have reviewed the NPO Status.   I have reviewed the Medications.     Review of Systems  Anesthesia Hx:  No problems with previous Anesthesia  Denies Family Hx of Anesthesia complications.   Denies Personal Hx of Anesthesia complications.   Social:  Non-Smoker, No Alcohol Use Advanced age   Hematology/Oncology:         -- Anemia:   Cardiovascular:   Exercise tolerance: good Hypertension Dysrhythmias    Pulmonary:   Shortness of breath    Hepatic/GI:   Bowel Prep. GI bleed - symptomatic anemia       Physical Exam  General: Well nourished, Cooperative and Alert    Airway:  Mallampati: II   Mouth Opening: Normal  TM Distance: Normal  Tongue: Normal  Neck ROM: Normal ROM    Chest/Lungs:  Clear to auscultation, Normal Respiratory Rate    Heart:  Rate: Normal  Rhythm: Regular Rhythm        Chemistry        Component Value Date/Time     10/15/2023 0435    K 3.8 10/15/2023 0435     10/15/2023 0435    CO2 30 10/15/2023 0435    BUN 29 (H) 10/15/2023 0435    CREATININE 0.71 10/15/2023 0435     10/15/2023 0435        Component Value Date/Time    CALCIUM 8.1 (L) 10/15/2023 0435    ALKPHOS 66 10/14/2023 0519    AST 17 10/14/2023 0519    ALT 20 10/14/2023 0519    BILITOT 0.6 10/14/2023 0519    EGFRNONAA 60 02/09/2022 1451        Lab Results   Component Value Date    WBC 6.09 10/15/2023    HGB 8.5 (L) 10/15/2023    HGB 8.5 (L) 10/15/2023    HCT 25.5 (L) 10/15/2023    HCT 25.5 (L) 10/15/2023     10/15/2023     Results for orders placed or performed in visit on 08/16/23   EKG 12-lead    Collection Time: 08/16/23  3:22 PM    Narrative    Test Reason : I10,    Vent. Rate : 091 BPM     Atrial Rate : 091 BPM     P-R Int : 196 ms          QRS Dur :  084 ms      QT Int : 350 ms       P-R-T Axes : 084 -42 083 degrees     QTc Int : 430 ms    Normal sinus rhythm  Left axis deviation  Septal infarct (cited on or before 27-JUL-2022)  Abnormal ECG  When compared with ECG of 27-JUL-2022 15:01,  Premature supraventricular complexes are no longer Present  The axis Shifted left  Confirmed by Jose Dowell DO ([id number]) on 8/16/2023 11:19:40 PM    Referred By:             Confirmed By:Jose Dowell DO         Anesthesia Plan  Type of Anesthesia, risks & benefits discussed:    Anesthesia Type: MAC, Gen Natural Airway  Intra-op Monitoring Plan: Standard ASA Monitors  Post Op Pain Control Plan: multimodal analgesia  Induction:  IV  Informed Consent: Informed consent signed with the Patient and all parties understand the risks and agree with anesthesia plan.  All questions answered.   ASA Score: 3  Day of Surgery Review of History & Physical: H&P Update referred to the surgeon/provider.I have interviewed and examined the patient. I have reviewed the patient's H&P dated: There are no significant changes.     Ready For Surgery From Anesthesia Perspective.     .

## 2023-10-16 VITALS
DIASTOLIC BLOOD PRESSURE: 74 MMHG | RESPIRATION RATE: 10 BRPM | WEIGHT: 154 LBS | BODY MASS INDEX: 28.34 KG/M2 | HEIGHT: 62 IN | HEART RATE: 79 BPM | TEMPERATURE: 98 F | SYSTOLIC BLOOD PRESSURE: 171 MMHG | OXYGEN SATURATION: 97 %

## 2023-10-16 LAB
ANION GAP SERPL CALCULATED.3IONS-SCNC: 11 MMOL/L (ref 7–16)
BASOPHILS # BLD AUTO: 0.02 K/UL (ref 0–0.2)
BASOPHILS NFR BLD AUTO: 0.2 % (ref 0–1)
BUN SERPL-MCNC: 13 MG/DL (ref 7–18)
BUN/CREAT SERPL: 18 (ref 6–20)
CALCIUM SERPL-MCNC: 8.4 MG/DL (ref 8.5–10.1)
CHLORIDE SERPL-SCNC: 103 MMOL/L (ref 98–107)
CO2 SERPL-SCNC: 29 MMOL/L (ref 21–32)
CREAT SERPL-MCNC: 0.74 MG/DL (ref 0.55–1.02)
DIFFERENTIAL METHOD BLD: ABNORMAL
EGFR (NO RACE VARIABLE) (RUSH/TITUS): 77 ML/MIN/1.73M2
EOSINOPHIL # BLD AUTO: 0.09 K/UL (ref 0–0.5)
EOSINOPHIL NFR BLD AUTO: 1.1 % (ref 1–4)
ERYTHROCYTE [DISTWIDTH] IN BLOOD BY AUTOMATED COUNT: 14.3 % (ref 11.5–14.5)
GLUCOSE SERPL-MCNC: 110 MG/DL (ref 74–106)
HCT VFR BLD AUTO: 25.8 % (ref 38–47)
HCT VFR BLD AUTO: 28.5 % (ref 38–47)
HGB BLD-MCNC: 8.7 G/DL (ref 12–16)
HGB BLD-MCNC: 9.5 G/DL (ref 12–16)
IMM GRANULOCYTES # BLD AUTO: 0.03 K/UL (ref 0–0.04)
IMM GRANULOCYTES NFR BLD: 0.4 % (ref 0–0.4)
LYMPHOCYTES # BLD AUTO: 2.2 K/UL (ref 1–4.8)
LYMPHOCYTES NFR BLD AUTO: 26.9 % (ref 27–41)
MCH RBC QN AUTO: 30.4 PG (ref 27–31)
MCHC RBC AUTO-ENTMCNC: 33.3 G/DL (ref 32–36)
MCV RBC AUTO: 91.1 FL (ref 80–96)
MONOCYTES # BLD AUTO: 0.63 K/UL (ref 0–0.8)
MONOCYTES NFR BLD AUTO: 7.7 % (ref 2–6)
MPC BLD CALC-MCNC: 11.2 FL (ref 9.4–12.4)
NEUTROPHILS # BLD AUTO: 5.22 K/UL (ref 1.8–7.7)
NEUTROPHILS NFR BLD AUTO: 63.7 % (ref 53–65)
NRBC # BLD AUTO: 0 X10E3/UL
NRBC, AUTO (.00): 0 %
PLATELET # BLD AUTO: 179 K/UL (ref 150–400)
POTASSIUM SERPL-SCNC: 3.6 MMOL/L (ref 3.5–5.1)
RBC # BLD AUTO: 3.13 M/UL (ref 4.2–5.4)
SODIUM SERPL-SCNC: 139 MMOL/L (ref 136–145)
WBC # BLD AUTO: 8.19 K/UL (ref 4.5–11)

## 2023-10-16 PROCEDURE — 80048 BASIC METABOLIC PNL TOTAL CA: CPT | Performed by: FAMILY MEDICINE

## 2023-10-16 PROCEDURE — 25000003 PHARM REV CODE 250: Performed by: STUDENT IN AN ORGANIZED HEALTH CARE EDUCATION/TRAINING PROGRAM

## 2023-10-16 PROCEDURE — 99239 PR HOSPITAL DISCHARGE DAY,>30 MIN: ICD-10-PCS | Mod: ,,, | Performed by: STUDENT IN AN ORGANIZED HEALTH CARE EDUCATION/TRAINING PROGRAM

## 2023-10-16 PROCEDURE — 85025 COMPLETE CBC W/AUTO DIFF WBC: CPT | Performed by: FAMILY MEDICINE

## 2023-10-16 PROCEDURE — 99239 HOSP IP/OBS DSCHRG MGMT >30: CPT | Mod: ,,, | Performed by: STUDENT IN AN ORGANIZED HEALTH CARE EDUCATION/TRAINING PROGRAM

## 2023-10-16 PROCEDURE — 85014 HEMATOCRIT: CPT | Performed by: STUDENT IN AN ORGANIZED HEALTH CARE EDUCATION/TRAINING PROGRAM

## 2023-10-16 RX ORDER — PANTOPRAZOLE SODIUM 40 MG/1
40 TABLET, DELAYED RELEASE ORAL DAILY
Status: DISCONTINUED | OUTPATIENT
Start: 2023-10-16 | End: 2023-10-16

## 2023-10-16 RX ADMIN — PANTOPRAZOLE SODIUM 40 MG: 40 TABLET, DELAYED RELEASE ORAL at 08:10

## 2023-10-16 NOTE — PLAN OF CARE
[facility]  Initial Discharge Assessment       Primary Care Provider: Jia Montero FNP    Admission Diagnosis: Upper GI bleeding [K92.2]    Admission Date: 10/14/2023  Expected Discharge Date: 10/16/2023    Transition of Care Barriers: None    Payor: MEDICARE / Plan: MEDICARE PART A & B / Product Type: Government /     Extended Emergency Contact Information  Primary Emergency Contact: Meg Silva  Address: [address] [address]  Mobile Phone: [phone]  Relation: Daughter  Secondary Emergency Contact: REGINA SPANN  Address: [address] [address]  Mobile Phone: [phone]  Relation: Son  Preferred language: English   needed? No    Discharge Plan A: Assisted Living  Discharge Plan B: Assisted Living      [facility] [place] [address]  [address] [place], [place] [id number]  Phone: [phone] Fax: [phone]      Initial Assessment (most recent)       Adult Discharge Assessment - 10/16/23 1030          Discharge Assessment    Assessment Type Discharge Planning Assessment     Source of Information patient     Communicated MADHURI with patient/caregiver Date not available/Unable to determine     People in Home --   Assisted Living    Facility Arrived From: [facility]     Do you expect to return to your current living situation? Yes     Do you have help at home or someone to help you manage your care at home? Yes     Who are your caregiver(s) and their phone number(s)? [facility] staff     Prior to hospitilization cognitive status: Unable to Assess     Current cognitive status: Alert/Oriented     Walking or Climbing Stairs ambulation difficulty, requires equipment     Mobility Management Rollator     Home Accessibility stairs to enter home;wheelchair accessible     Home Layout Able to live on 1st floor      Equipment Currently Used at Home rollator     Patient currently being followed by outpatient case management? No     Do you currently have service(s) that help you manage your care at home? No     Do you take prescription medications? Yes     Do you have prescription coverage? Yes     Coverage Medicare     Do you have any problems affording any of your prescribed medications? No     Is the patient taking medications as prescribed? yes     Who is going to help you get home at discharge? daughter     How do you get to doctors appointments? family or friend will provide     Are you on dialysis? No     Do you take coumadin? No     DME Needed Upon Discharge  none     Discharge Plan discussed with: Patient;Adult children     Transition of Care Barriers None     Discharge Plan A Assisted Living     Discharge Plan B Assisted Living        Physical Activity    On average, how many days per week do you engage in moderate to strenuous exercise (like a brisk walk)? 0 days     On average, how many minutes do you engage in exercise at this level? 0 min        Financial Resource Strain    How hard is it for you to pay for the very basics like food, housing, medical care, and heating? Not hard at all        Housing Stability    In the last 12 months, was there a time when you were not able to pay the mortgage or rent on time? No     In the last 12 months, how many places have you lived? 1     In the last 12 months, was there a time when you did not have a steady place to sleep or slept in a shelter (including now)? No        Transportation Needs    In the past 12 months, has lack of transportation kept you from medical appointments or from getting medications? No     In the past 12 months, has lack of transportation kept you from meetings, work, or from getting things needed for daily living? No        Food Insecurity    Within the past 12 months, you worried that your food would run out before you got the money to buy more.  Often true     Within the past 12 months, the food you bought just didn't last and you didn't have money to get more. Often true        Stress    Do you feel stress - tense, restless, nervous, or anxious, or unable to sleep at night because your mind is troubled all the time - these days? Not at all        Social Connections    In a typical week, how many times do you talk on the phone with family, friends, or neighbors? More than three times a week     How often do you get together with friends or relatives? More than three times a week     How often do you attend Lutheran or Presybeterian services? More than 4 times per year     Do you belong to any clubs or organizations such as Lutheran groups, unions, fraternal or athletic groups, or school groups? No     How often do you attend meetings of the clubs or organizations you belong to? Never     Are you , , , , never , or living with a partner?         Alcohol Use    Q1: How often do you have a drink containing alcohol? Never     Q2: How many drinks containing alcohol do you have on a typical day when you are drinking? Patient does not drink     Q3: How often do you have six or more drinks on one occasion? Never                            Pt is from [facility]. Pt will return when discharged. No needs voiced.

## 2023-10-16 NOTE — NURSING
[address] [facility] in [place], [phone] given update over phone on patient discharge status. Will send discharge packet with patient.

## 2023-10-16 NOTE — PLAN OF CARE
Problem: Adult Inpatient Plan of Care  Goal: Plan of Care Review  Outcome: Ongoing, Progressing  Goal: Patient-Specific Goal (Individualized)  Outcome: Ongoing, Progressing  Goal: Absence of Hospital-Acquired Illness or Injury  Outcome: Ongoing, Progressing  Goal: Optimal Comfort and Wellbeing  Outcome: Ongoing, Progressing  Goal: Readiness for Transition of Care  Outcome: Ongoing, Progressing     Problem: Fall Injury Risk  Goal: Absence of Fall and Fall-Related Injury  Outcome: Ongoing, Progressing  Intervention: Promote Injury-Free Environment  Flowsheets (Taken 10/16/2023 0316)  Safety Promotion/Fall Prevention:   assistive device/personal item within reach   bed alarm set   Fall Risk signage in place   Fall Risk reviewed with patient/family   side rails raised x 2   instructed to call staff for mobility   medications reviewed

## 2023-10-16 NOTE — PLAN OF CARE
[facility]  Discharge Final Note    Primary Care Provider: Jia Montero FNP    Expected Discharge Date: 10/16/2023    Final Discharge Note (most recent)       Final Note - 10/16/23 1047          Final Note    Assessment Type Final Discharge Note     Anticipated Discharge Disposition Home or Self Care                     Important Message from Medicare  Important Message from Medicare regarding Discharge Appeal Rights: Given to patient/caregiver, Explained to patient/caregiver, Signed/date by patient/caregiver     Date IMM was signed: 10/16/23  Time IMM was signed: 0920    Contact Info       Jose Purcell MD   Specialty: Otolaryngology    [address] [facility]  [facility] [address]   Phone: [phone]       Next Steps: Schedule an appointment as soon as possible for a visit in 1 week(s)    Instructions: Please follow up on October 17 at 2:40    Jia Montero FNP   Specialty: Family Medicine   Relationship: PCP - General    [address] [place] [address]/[facility] [place] [address]   Phone: [phone]       Next Steps: Follow up          Pt discharged back to [facility] with no needs.

## 2023-10-16 NOTE — DISCHARGE SUMMARY
[facility] Medicine  Discharge Summary      Patient Name: Purnima Cardona  MRN: [id number]  GHANSHYAM: [id number]  Patient Class: IP- Inpatient  Admission Date: 10/14/2023  Hospital Length of Stay: 2 days  Discharge Date and Time:  10/16/2023 6:43 AM  Attending Physician: Mauricio Fournier DO   Discharging Provider: Mauricio Fournier DO  Primary Care Provider: Jia Montero FNP    Primary Care Team: Networked reference to record PCT     HPI:   Patient is an 89 y/o WF resident at the [facility] that reports several days of diarrhea.  At about 4am had bowel movement with a great deal of blood and clots.  Patient thinks bright red.  Report to ER was more maroon.  Stool heme positive in ER.  Patient does report intermittent slight bleeding in the past she attributes to hemorrhoids.  Nothing like this however.  She denies N/V abdominal pain. No fever or chills.       * No surgery found *      Hospital Course:   10/15-chart reviewed, hemorrhoids and diverticuli, no active bleeding. Hb stable    10/16-Hb stable, stable for DC. Follow up with PCP.  Follow up with ENT for ear fullness       Goals of Care Treatment Preferences:  Code Status: Full Code      Consults:   Consults (From admission, onward)        Status Ordering Provider     Inpatient consult to Gastroenterology  Once        Provider:  NELLY Lara MD    Acknowledged NEFTALI FULLER JR          Cardiac/Vascular  Essential hypertension  pcp follow up    GI  * Acute GI bleeding    Resolved, PPI daily. Stop ASA  Follow up PCP      Final Active Diagnoses:    Diagnosis Date Noted POA    PRINCIPAL PROBLEM:  Acute GI bleeding [K92.2] 10/14/2023 Unknown    Essential hypertension [I10] 04/25/2021 Yes      Problems Resolved During this Admission:       Discharged Condition: good    Disposition: Home or Self Care    Follow Up:   Follow-up Information     Jia Montero FNP. Schedule an appointment as soon as possible for a visit in 1  week(s).    Specialty: Family Medicine  Contact information:  [address] [facility] [place] [address]  [phone]             Jose Purcell MD. Schedule an appointment as soon as possible for a visit in 1 week(s).    Specialty: Otolaryngology  Contact information:  [address]  [facility]  [place] [address]  [phone]                       Patient Instructions:      Diet Cardiac     Activity as tolerated       Significant Diagnostic Studies: Labs: All labs within the past 24 hours have been reviewed    Pending Diagnostic Studies:     Procedure Component Value Units Date/Time    EXTRA TUBES [[id number]] Collected: 10/14/23 1144    Order Status: Sent Lab Status: In process Updated: 10/14/23 1156    Specimen: Blood, Venous     Narrative:      The following orders were created for panel order EXTRA TUBES.  Procedure                               Abnormality         Status                     ---------                               -----------         ------                     Light Green Top Hold[[id number]]                            In process                   Please view results for these tests on the individual orders.         Medications:  Reconciled Home Medications:      Medication List      CONTINUE taking these medications    amLODIPine 2.5 MG tablet  Commonly known as: NORVASC  Take 1 tablet (2.5 mg total) by mouth once daily.     calcium carbonate 600 mg calcium (1,500 mg) Tab  Commonly known as: OS-NASREEN  Take 600 mg by mouth once daily. Take 1 tablet po BID     furosemide 20 MG tablet  Commonly known as: LASIX  Take 1 tablet (20 mg total) by mouth once daily.     lisinopriL 40 MG tablet  Commonly known as: PRINIVIL,ZESTRIL  Take 1 tablet (40 mg total) by mouth once daily.     metOLazone 2.5 MG tablet  Commonly known as: ZAROXOLYN  Take 2 tablets (5 mg total) by mouth once daily. for 4 days     MULTI VITAMIN ORAL  Take by  mouth.     pantoprazole 40 MG tablet  Commonly known as: PROTONIX  Take 1 tablet (40 mg total) by mouth once daily.     potassium 99 mg Tab  Take 1 tablet by mouth once daily.     TYLENOL EXTRA STRENGTH ORAL  Take by mouth.        STOP taking these medications    aspirin 81 MG EC tablet  Commonly known as: ECOTRIN            Indwelling Lines/Drains at time of discharge:   Lines/Drains/Airways     None                 Time spent on the discharge of patient: >30 minutes         Mauricio Fournier DO  Department of Hospital Medicine  [facility]

## 2023-10-16 NOTE — NURSING
5629 Discharge packet given to and reviewed with patient and family at this time. Pt and family verbalized understanding. Questions answered to the best of my ability. No further needs voiced at this time.

## 2023-10-17 ENCOUNTER — OFFICE VISIT (OUTPATIENT)
Dept: OTOLARYNGOLOGY | Facility: CLINIC | Age: 88
End: 2023-10-17
Payer: MEDICARE

## 2023-10-17 ENCOUNTER — PATIENT OUTREACH (OUTPATIENT)
Dept: ADMINISTRATIVE | Facility: CLINIC | Age: 88
End: 2023-10-17

## 2023-10-17 VITALS — BODY MASS INDEX: 28.34 KG/M2 | HEIGHT: 62 IN | WEIGHT: 154 LBS

## 2023-10-17 DIAGNOSIS — H61.23 BILATERAL IMPACTED CERUMEN: Primary | ICD-10-CM

## 2023-10-17 DIAGNOSIS — H90.2 CONDUCTIVE HEARING LOSS, UNSPECIFIED LATERALITY: ICD-10-CM

## 2023-10-17 PROCEDURE — 69210 PR REMOVAL IMPACTED CERUMEN REQUIRING INSTRUMENTATION, UNILATERAL: ICD-10-PCS | Mod: S$PBB,,, | Performed by: OTOLARYNGOLOGY

## 2023-10-17 PROCEDURE — 99204 PR OFFICE/OUTPT VISIT, NEW, LEVL IV, 45-59 MIN: ICD-10-PCS | Mod: 25,S$PBB,, | Performed by: OTOLARYNGOLOGY

## 2023-10-17 PROCEDURE — 69210 REMOVE IMPACTED EAR WAX UNI: CPT | Mod: S$PBB,,, | Performed by: OTOLARYNGOLOGY

## 2023-10-17 PROCEDURE — 99213 OFFICE O/P EST LOW 20 MIN: CPT | Mod: PBBFAC | Performed by: OTOLARYNGOLOGY

## 2023-10-17 PROCEDURE — 69210 REMOVE IMPACTED EAR WAX UNI: CPT | Mod: 50,PBBFAC | Performed by: OTOLARYNGOLOGY

## 2023-10-17 PROCEDURE — 99204 OFFICE O/P NEW MOD 45 MIN: CPT | Mod: 25,S$PBB,, | Performed by: OTOLARYNGOLOGY

## 2023-10-17 NOTE — PROGRESS NOTES
C3 nurse spoke with caregiver Snehal at [facility] for a TCC post hospital discharge follow up call. The patient reports does not have a scheduled HOSFU appointment. C3 nurse was unable to schedule HOSFU appointment for [facility]. Caregiver stated patients daughter manages appointments. C3 nurse attempted to contact daughter to advised to contact their PCP to schedule a HOSPFU within 5-7 days.

## 2023-10-17 NOTE — PROGRESS NOTES
Subjective:       Patient ID: Purnima Cardona is a 89 y.o. female.    Chief Complaint: Ear Fullness (Bilateral ears. Pt states right ear is worse than left ear. )    Ear Fullness   Associated symptoms include hearing loss.     Review of Systems   HENT:  Positive for ear pain and hearing loss.    All other systems reviewed and are negative.      Objective:      Physical Exam  General: NAD  Head: Normocephalic, atraumatic, no facial asymmetry/normal strength,  Ears: Both auricules normal in appearance, w/o deformities tympanic membranes normal external auditory canals severe bilateral wax impactions  Nose: External nose w/o deformities normal turbinates no drainage or inflammation  Oral Cavity: Lips, gums, floor of mouth, tongue hard palate, and buccal mucosa without mass/lesion  Oropharynx: Mucosa pink and moist, soft palate, posterior pharynx and oropharyngeal wall without mass/lesion  Neck: Supple, symmetric, trachea midline, no palpable mass/lesion, no palpable cervical lymphadenopathy  Skin: Warm and dry, no concerning lesions  Respiratory: Respirations even, unlabored     Procedure: Binocular microscopy with removal of cerumen impaction using microsurgical instrumentation.  After explaining the procedure and obtaining verbal assent,  each external auditory canal visualized with the 250 mm focal length lens through the operating microscope. The obstructing cerumen was removed with microsurgical instrumentation to reveal normal and healthy external auditory canals. The patient tolerated this procedure well without complication.     Assessment:       1. Bilateral impacted cerumen    2. Conductive hearing loss, unspecified laterality        Plan:       F/u 6 months discussed cleaning

## 2023-10-18 NOTE — PROGRESS NOTES
C3 nurse contacted patient's daughter Meg Silva to advise to schedule hospfu visit. Daughter reports she would like to find another [facility] provider close to [facility] in [place] where pt resides. Advised on [place] [facility], daughter in agreement. Hospfu scheduled for 10/24/23 at 315.

## 2023-10-26 DIAGNOSIS — I10 HYPERTENSION, UNSPECIFIED TYPE: ICD-10-CM

## 2023-10-26 DIAGNOSIS — R60.0 LOWER EXTREMITY EDEMA: ICD-10-CM

## 2023-10-28 RX ORDER — LISINOPRIL 40 MG/1
40 TABLET ORAL DAILY
Qty: 90 TABLET | Refills: 3 | Status: SHIPPED | OUTPATIENT
Start: 2023-10-28

## 2023-11-02 ENCOUNTER — OFFICE VISIT (OUTPATIENT)
Dept: FAMILY MEDICINE | Facility: CLINIC | Age: 88
End: 2023-11-02
Payer: MEDICARE

## 2023-11-02 VITALS
BODY MASS INDEX: 26.68 KG/M2 | WEIGHT: 145 LBS | DIASTOLIC BLOOD PRESSURE: 73 MMHG | OXYGEN SATURATION: 97 % | RESPIRATION RATE: 20 BRPM | HEIGHT: 62 IN | SYSTOLIC BLOOD PRESSURE: 139 MMHG | HEART RATE: 8 BPM | TEMPERATURE: 98 F

## 2023-11-02 DIAGNOSIS — R53.1 WEAKNESS: ICD-10-CM

## 2023-11-02 DIAGNOSIS — K57.90 DIVERTICULAR DISEASE: Primary | ICD-10-CM

## 2023-11-02 DIAGNOSIS — D64.9 ANEMIA, UNSPECIFIED TYPE: ICD-10-CM

## 2023-11-02 DIAGNOSIS — I10 HYPERTENSION, UNSPECIFIED TYPE: ICD-10-CM

## 2023-11-02 PROCEDURE — 99495 TCM SERVICES (MODERATE COMPLEXITY): ICD-10-PCS | Mod: ,,, | Performed by: NURSE PRACTITIONER

## 2023-11-02 PROCEDURE — 99495 TRANSJ CARE MGMT MOD F2F 14D: CPT | Mod: ,,, | Performed by: NURSE PRACTITIONER

## 2023-11-02 RX ORDER — OMEPRAZOLE 20 MG/1
20 CAPSULE, DELAYED RELEASE ORAL DAILY
COMMUNITY
End: 2023-11-02

## 2023-11-02 RX ORDER — AMOXICILLIN 500 MG/1
CAPSULE ORAL
COMMUNITY
Start: 2023-09-08 | End: 2023-11-02

## 2023-11-02 RX ORDER — POTASSIUM CHLORIDE 600 MG/1
8 TABLET, FILM COATED, EXTENDED RELEASE ORAL DAILY
Qty: 90 TABLET | Refills: 0 | Status: SHIPPED | OUTPATIENT
Start: 2023-11-02 | End: 2024-02-05 | Stop reason: SDUPTHER

## 2023-11-02 RX ORDER — CHLORHEXIDINE GLUCONATE ORAL RINSE 1.2 MG/ML
SOLUTION DENTAL
COMMUNITY
Start: 2023-09-08 | End: 2023-11-02

## 2023-11-02 NOTE — PROGRESS NOTES
[facility] - FAMILY MEDICINE       PATIENT NAME: Purnima Cardona   : 1934    AGE: 89 y.o. DATE OF ENCOUNTER: 23    MRN: [id number]      PCP: Candida Slaughter FNP    Reason for Visit / Chief Complaint:  Transitional Care (Rm 2/Patient presents to clinic for hospital follow up of diverticulosis. Patient complains of swelling in feet for several weeks.  Requesting physical therapy. )         274}    Subjective:     HPI:    Transitional Care Note    Purnima Cardona presents for a Transitional Care Management hospital discharge follow-up visit. [facility] (date: [facility]/2023) and discharged on (date: 10/16/2023). This is her face to face visit. She had interactive contact on (date: 10/17/2023) which is documented in a phone encounter in her chart.     Family and/or Caretaker present at visit?  Yes.  Diagnostic tests reviewed/disposition: I have reviewed all completed as well as pending diagnostic tests at the time of discharge.  Disease/illness education: diverticular disease   Home health/community services discussion/referrals: Patient does not have home health established from hospital visit.  They do need home health.  If needed, we will set up home health for the patient.   Establishment or re-establishment of referral orders for community resources:  Lives at [facility] .   Discussion with other health care providers: No discussion with other health care providers necessary.     Discharge and current medications have been reconciled.  Patient was admitted for gi bleed. Underwent colonoscopy per Dr Lara  Multiple diverticula of moderate severity containing stool in the descending colon and sigmoid colon; no bleeding was identified  Three internal medium hemorrhoids observed during retroflexion; no bleeding was identified  The cecum, ascending colon and transverse colon appeared normal.        Dr Dowell Cardiologist   2023  Vent. Rate : 091 BPM     Atrial  Rate : 091 BPM      P-R Int : 196 ms          QRS Dur : 084 ms       QT Int : 350 ms       P-R-T Axes : 084 -42 083 degrees      QTc Int : 430 ms     Normal sinus rhythm   Left axis deviation   Septal infarct (cited on or before 27-JUL-2022)   Abnormal ECG   When compared with ECG of 27-JUL-2022 15:01,   Premature supraventricular complexes are no longer Present   The axis Shifted left   Confirmed by Jose Dowell DO ([id number]) on 8/16/2023 11:19:40 PM     Referred By:             Confirmed By:Jose Dowell DO    Review of Systems:   Review of Systems    Allergies and Meds: 274}     Review of patient's allergies indicates:   Allergen Reactions    Clonidine     Hiprex [methenamine hippurate]     Sulfa (sulfonamide antibiotics)         Current Outpatient Medications:     acetaminophen (TYLENOL EXTRA STRENGTH ORAL), Take by mouth., Disp: , Rfl:     amLODIPine (NORVASC) 2.5 MG tablet, Take 1 tablet (2.5 mg total) by mouth once daily., Disp: 30 tablet, Rfl: 5    calcium carbonate (OS-NASREEN) 600 mg calcium (1,500 mg) Tab, Take 600 mg by mouth once daily. Take 1 tablet po BID, Disp: , Rfl:     furosemide (LASIX) 20 MG tablet, Take 1 tablet (20 mg total) by mouth once daily., Disp: 90 tablet, Rfl: 3    lisinopriL (PRINIVIL,ZESTRIL) 40 MG tablet, Take 1 tablet (40 mg total) by mouth once daily., Disp: 90 tablet, Rfl: 3    multivit-min/ferrous fumarate (MULTI VITAMIN ORAL), Take by mouth., Disp: , Rfl:     pantoprazole (PROTONIX) 40 MG tablet, Take 1 tablet (40 mg total) by mouth once daily., Disp: 90 tablet, Rfl: 1    potassium chloride (KLOR-CON) 8 MEQ TbSR, Take 1 tablet (8 mEq total) by mouth once daily. for 90 doses, Disp: 90 tablet, Rfl: 0    Labs:274}    I have reviewed old labs below:  Lab Results   Component Value Date    WBC 8.19 10/16/2023    RBC 3.13 (L) 10/16/2023    HGB 8.7 (L) 10/16/2023    HCT 25.8 (L) 10/16/2023     10/16/2023     10/16/2023    K 3.6 10/16/2023     10/16/2023    CALCIUM 8.4  "(L) 10/16/2023     (H) 10/16/2023    BUN 13 10/16/2023    CREATININE 0.74 10/16/2023    EGFRNONAA 60 02/09/2022    ALT 20 10/14/2023    AST 17 10/14/2023    INR 1.09 10/14/2023    HGBA1C 6.6 10/27/2022       Medical History: 274}     Past Medical History:   Diagnosis Date    Abnormal EKG     Essential hypertension     Hyperlipidemia     Shortness of breath       Social History     Tobacco Use   Smoking Status Never   Smokeless Tobacco Never      Past Surgical History:   Procedure Laterality Date    CHOLECYSTECTOMY      EYE SURGERY      cataract removal    HYSTERECTOMY          Health Maintenance: 274}     Health Maintenance         Date Due Completion Date    COVID-19 Vaccine (1) Never done ---    TETANUS VACCINE Never done ---    Shingles Vaccine (1 of 2) Never done ---    RSV Vaccine (Age 60+) (1 - 1-dose 60+ series) Never done ---    Pneumococcal Vaccines (Age 65+) (1 - PCV) Never done ---    Influenza Vaccine (1) 09/01/2023 10/27/2022 (Declined)    Override on 10/27/2022: Declined    Lipid Panel 05/22/2025 5/22/2020            Objective:  274}   /73 (BP Location: Left arm, Patient Position: Sitting, BP Method: Large (Automatic))   Pulse (!) 8   Temp 98.1 °F (36.7 °C) (Oral)   Resp 20   Ht 5' 2" (1.575 m)   Wt 65.8 kg (145 lb)   SpO2 97%   BMI 26.52 kg/m²     Wt Readings from Last 3 Encounters:   11/02/23 65.8 kg (145 lb)   10/17/23 69.9 kg (154 lb)   10/15/23 69.9 kg (154 lb)     BP Readings from Last 3 Encounters:   11/02/23 139/73   10/16/23 (!) 171/74   08/16/23 (!) 170/88     Body mass index is 26.52 kg/m².     Physical Exam  Vitals reviewed.   Constitutional:       Appearance: Normal appearance.   HENT:      Head: Normocephalic.      Right Ear: Tympanic membrane, ear canal and external ear normal.      Left Ear: Tympanic membrane, ear canal and external ear normal.      Nose: Nose normal.      Mouth/Throat:      Mouth: Mucous membranes are moist.   Eyes:      Extraocular Movements: " Extraocular movements intact.   Cardiovascular:      Rate and Rhythm: Normal rate.      Pulses: Normal pulses.   Pulmonary:      Effort: Pulmonary effort is normal.   Abdominal:      Palpations: Abdomen is soft.   Musculoskeletal:         General: Normal range of motion.      Cervical back: Normal range of motion.   Skin:     General: Skin is warm and dry.      Capillary Refill: Capillary refill takes less than 2 seconds.   Neurological:      General: No focal deficit present.      Mental Status: She is alert and oriented to person, place, and time.   Psychiatric:         Mood and Affect: Mood normal.         Behavior: Behavior normal.         Thought Content: Thought content normal.         Judgment: Judgment normal.          Assessment and Plan: 274}     1. Diverticular disease      prior cscope per GI bleeding resolved spontaneously  disscussed foods to avoid with diverticular disease         2. Hypertension, unspecified type      continue lisinorpil, lasix and amlodipine      3. Anemia, unspecified type  Ambulatory referral/consult to Home Health    hgb 8.7 on discharge will continue to monitor  mv with iron recommended      4. Weakness  Ambulatory referral/consult to Home Health    HH consulted for PT/OT/skilled nursing            Return to clinic 3 mo; and sooner as needed.    Future Appointments   Date Time Provider Department Center   1/17/2024  1:45 PM Jose Dowell DO [facility]   2/5/2024  2:45 PM Candida Slaughter FNP [facility] [place]   4/17/2024  2:40 PM Jose Purcell MD [facility] ENT [facility]        Signature:  Candida WALKER

## 2023-11-15 ENCOUNTER — TELEPHONE (OUTPATIENT)
Dept: CARDIOLOGY | Facility: CLINIC | Age: 88
End: 2023-11-15
Payer: MEDICARE

## 2023-11-15 NOTE — TELEPHONE ENCOUNTER
Candi from [facility] called and stated pt was having 3+ edema in her lower extremities for about 1 week. Spoke with Dr Dowell and he suggest pt to increase lasix to 40mg daily for 3 days then resume 20mg daily. I called Candi and informed her of these orders. She is calling the pt and letting her know of these orders. I informed Candi that if that didn't work to call the office back. She voiced understanding.

## 2023-11-29 RX ORDER — PANTOPRAZOLE SODIUM 40 MG/1
40 TABLET, DELAYED RELEASE ORAL DAILY
Qty: 90 TABLET | Refills: 4 | Status: SHIPPED | OUTPATIENT
Start: 2023-11-29

## 2023-12-09 DIAGNOSIS — Z71.89 COMPLEX CARE COORDINATION: ICD-10-CM

## 2023-12-18 ENCOUNTER — DOCUMENT SCAN (OUTPATIENT)
Dept: HOME HEALTH SERVICES | Facility: HOSPITAL | Age: 88
End: 2023-12-18
Payer: MEDICARE

## 2024-01-01 ENCOUNTER — HOSPITAL ENCOUNTER (INPATIENT)
Facility: HOSPITAL | Age: 89
LOS: 6 days | DRG: 870 | End: 2024-10-05
Attending: EMERGENCY MEDICINE | Admitting: STUDENT IN AN ORGANIZED HEALTH CARE EDUCATION/TRAINING PROGRAM
Payer: MEDICARE

## 2024-01-01 VITALS
SYSTOLIC BLOOD PRESSURE: 114 MMHG | WEIGHT: 187.38 LBS | TEMPERATURE: 100 F | OXYGEN SATURATION: 69 % | BODY MASS INDEX: 36.79 KG/M2 | HEIGHT: 60 IN | DIASTOLIC BLOOD PRESSURE: 58 MMHG | HEART RATE: 110 BPM | RESPIRATION RATE: 23 BRPM

## 2024-01-01 DIAGNOSIS — S43.004A DISLOCATION OF RIGHT SHOULDER JOINT, INITIAL ENCOUNTER: Primary | ICD-10-CM

## 2024-01-01 DIAGNOSIS — J18.9 PNEUMONIA OF RIGHT LOWER LOBE DUE TO INFECTIOUS ORGANISM: ICD-10-CM

## 2024-01-01 DIAGNOSIS — R73.9 STRESS HYPERGLYCEMIA: ICD-10-CM

## 2024-01-01 DIAGNOSIS — I46.9 CARDIAC ARREST: ICD-10-CM

## 2024-01-01 DIAGNOSIS — J93.9 PNEUMOTHORAX, LEFT: ICD-10-CM

## 2024-01-01 DIAGNOSIS — R40.4 SEDATED DUE TO MEDICATION: ICD-10-CM

## 2024-01-01 DIAGNOSIS — Z99.11 ON MECHANICALLY ASSISTED VENTILATION: ICD-10-CM

## 2024-01-01 DIAGNOSIS — G92.9 TOXIC ENCEPHALOPATHY, UNSPECIFIED TOXIN: ICD-10-CM

## 2024-01-01 DIAGNOSIS — T50.905A SEDATED DUE TO MEDICATION: ICD-10-CM

## 2024-01-01 DIAGNOSIS — R06.02 SHORTNESS OF BREATH: ICD-10-CM

## 2024-01-01 DIAGNOSIS — A41.9 SEPTIC SHOCK: ICD-10-CM

## 2024-01-01 DIAGNOSIS — R65.21 SEPTIC SHOCK: ICD-10-CM

## 2024-01-01 DIAGNOSIS — I46.9 CARDIORESPIRATORY ARREST: ICD-10-CM

## 2024-01-01 DIAGNOSIS — I49.01 VENTRICULAR FIBRILLATION: ICD-10-CM

## 2024-01-01 DIAGNOSIS — R41.82 ALTERED MENTAL STATUS, UNSPECIFIED ALTERED MENTAL STATUS TYPE: ICD-10-CM

## 2024-01-01 DIAGNOSIS — N17.9 AKI (ACUTE KIDNEY INJURY): ICD-10-CM

## 2024-01-01 LAB
ALBUMIN SERPL BCP-MCNC: 2.8 G/DL (ref 3.5–5)
ALBUMIN/GLOB SERPL: 0.8 {RATIO}
ALP SERPL-CCNC: 175 U/L (ref 55–142)
ALT SERPL W P-5'-P-CCNC: 782 U/L (ref 13–56)
ANION GAP SERPL CALCULATED.3IONS-SCNC: 10 MMOL/L (ref 7–16)
ANION GAP SERPL CALCULATED.3IONS-SCNC: 11 MMOL/L (ref 7–16)
ANION GAP SERPL CALCULATED.3IONS-SCNC: 12 MMOL/L (ref 7–16)
ANION GAP SERPL CALCULATED.3IONS-SCNC: 13 MMOL/L (ref 7–16)
ANION GAP SERPL CALCULATED.3IONS-SCNC: 14 MMOL/L (ref 7–16)
ANION GAP SERPL CALCULATED.3IONS-SCNC: 7 MMOL/L (ref 7–16)
ANION GAP SERPL CALCULATED.3IONS-SCNC: 8 MMOL/L (ref 7–16)
ANION GAP SERPL CALCULATED.3IONS-SCNC: 9 MMOL/L (ref 7–16)
AORTIC ROOT ANNULUS: 3.26 CM
AORTIC VALVE CUSP SEPERATION: 0.86 CM
APTT PPP: 30.9 SECONDS (ref 25.2–37.3)
AST SERPL W P-5'-P-CCNC: 1153 U/L (ref 15–37)
AV INDEX (PROSTH): 0.66
AV MEAN GRADIENT: 3.4 MMHG
AV PEAK GRADIENT: 6.8 MMHG
AV VALVE AREA BY VELOCITY RATIO: 2.3 CM²
AV VALVE AREA: 2.5 CM²
AV VELOCITY RATIO: 0.62
BACTERIA #/AREA URNS HPF: ABNORMAL /HPF
BACTERIA BLD CULT: NORMAL
BACTERIA BLD CULT: NORMAL
BASOPHILS # BLD AUTO: 0.01 K/UL (ref 0–0.2)
BASOPHILS # BLD AUTO: 0.02 K/UL (ref 0–0.2)
BASOPHILS # BLD AUTO: 0.03 K/UL (ref 0–0.2)
BASOPHILS # BLD AUTO: 0.05 K/UL (ref 0–0.2)
BASOPHILS NFR BLD AUTO: 0.1 % (ref 0–1)
BASOPHILS NFR BLD AUTO: 0.2 % (ref 0–1)
BASOPHILS NFR BLD AUTO: 0.3 % (ref 0–1)
BASOPHILS NFR BLD AUTO: 0.3 % (ref 0–1)
BILIRUB SERPL-MCNC: 1 MG/DL (ref ?–1.2)
BILIRUB UR QL STRIP: NEGATIVE
BSA FOR ECHO PROCEDURE: 1.66 M2
BUN SERPL-MCNC: 24 MG/DL (ref 7–18)
BUN SERPL-MCNC: 26 MG/DL (ref 7–18)
BUN SERPL-MCNC: 27 MG/DL (ref 7–18)
BUN SERPL-MCNC: 36 MG/DL (ref 7–18)
BUN SERPL-MCNC: 38 MG/DL (ref 7–18)
BUN SERPL-MCNC: 41 MG/DL (ref 7–18)
BUN SERPL-MCNC: 42 MG/DL (ref 7–18)
BUN SERPL-MCNC: 42 MG/DL (ref 7–18)
BUN SERPL-MCNC: 43 MG/DL (ref 7–18)
BUN SERPL-MCNC: 44 MG/DL (ref 7–18)
BUN SERPL-MCNC: 47 MG/DL (ref 7–18)
BUN/CREAT SERPL: 26 (ref 6–20)
BUN/CREAT SERPL: 27 (ref 6–20)
BUN/CREAT SERPL: 31 (ref 6–20)
BUN/CREAT SERPL: 32 (ref 6–20)
BUN/CREAT SERPL: 34 (ref 6–20)
BUN/CREAT SERPL: 36 (ref 6–20)
BUN/CREAT SERPL: 36 (ref 6–20)
BUN/CREAT SERPL: 37 (ref 6–20)
BUN/CREAT SERPL: 39 (ref 6–20)
BUN/CREAT SERPL: 43 (ref 6–20)
BUN/CREAT SERPL: 44 (ref 6–20)
CALCIUM SERPL-MCNC: 7.4 MG/DL (ref 8.5–10.1)
CALCIUM SERPL-MCNC: 7.5 MG/DL (ref 8.5–10.1)
CALCIUM SERPL-MCNC: 7.5 MG/DL (ref 8.5–10.1)
CALCIUM SERPL-MCNC: 7.9 MG/DL (ref 8.5–10.1)
CALCIUM SERPL-MCNC: 8.2 MG/DL (ref 8.5–10.1)
CALCIUM SERPL-MCNC: 8.4 MG/DL (ref 8.5–10.1)
CALCIUM SERPL-MCNC: 8.4 MG/DL (ref 8.5–10.1)
CALCIUM SERPL-MCNC: 8.7 MG/DL (ref 8.5–10.1)
CALCIUM SERPL-MCNC: 8.9 MG/DL (ref 8.5–10.1)
CALCIUM SERPL-MCNC: 8.9 MG/DL (ref 8.5–10.1)
CHLORIDE SERPL-SCNC: 100 MMOL/L (ref 98–107)
CHLORIDE SERPL-SCNC: 102 MMOL/L (ref 98–107)
CHLORIDE SERPL-SCNC: 107 MMOL/L (ref 98–107)
CHLORIDE SERPL-SCNC: 96 MMOL/L (ref 98–107)
CHLORIDE SERPL-SCNC: 98 MMOL/L (ref 98–107)
CHLORIDE SERPL-SCNC: 98 MMOL/L (ref 98–107)
CHLORIDE SERPL-SCNC: 99 MMOL/L (ref 98–107)
CK SERPL-CCNC: 133 U/L (ref 26–192)
CLARITY UR: ABNORMAL
CO2 SERPL-SCNC: 25 MMOL/L (ref 21–32)
CO2 SERPL-SCNC: 27 MMOL/L (ref 21–32)
CO2 SERPL-SCNC: 27 MMOL/L (ref 21–32)
CO2 SERPL-SCNC: 28 MMOL/L (ref 21–32)
CO2 SERPL-SCNC: 30 MMOL/L (ref 21–32)
CO2 SERPL-SCNC: 30 MMOL/L (ref 21–32)
CO2 SERPL-SCNC: 31 MMOL/L (ref 21–32)
CO2 SERPL-SCNC: 32 MMOL/L (ref 21–32)
COLOR UR: YELLOW
CREAT SERPL-MCNC: 0.55 MG/DL (ref 0.55–1.02)
CREAT SERPL-MCNC: 0.63 MG/DL (ref 0.55–1.02)
CREAT SERPL-MCNC: 0.7 MG/DL (ref 0.55–1.02)
CREAT SERPL-MCNC: 0.98 MG/DL (ref 0.55–1.02)
CREAT SERPL-MCNC: 1 MG/DL (ref 0.55–1.02)
CREAT SERPL-MCNC: 1.21 MG/DL (ref 0.55–1.02)
CREAT SERPL-MCNC: 1.23 MG/DL (ref 0.55–1.02)
CREAT SERPL-MCNC: 1.33 MG/DL (ref 0.55–1.02)
CREAT SERPL-MCNC: 1.53 MG/DL (ref 0.55–1.02)
CREAT SERPL-MCNC: 1.54 MG/DL (ref 0.55–1.02)
CREAT SERPL-MCNC: 1.63 MG/DL (ref 0.55–1.02)
CULTURE, LOWER RESPIRATORY: ABNORMAL
CV ECHO LV RWT: 0.61 CM
DIFFERENTIAL METHOD BLD: ABNORMAL
DOP CALC AO PEAK VEL: 1.3 M/S
DOP CALC AO VTI: 28.8 CM
DOP CALC LVOT AREA: 3.8 CM2
DOP CALC LVOT DIAMETER: 2.2 CM
DOP CALC LVOT PEAK VEL: 0.8 M/S
DOP CALC LVOT STROKE VOLUME: 72.2 CM3
DOP CALC MV VTI: 47.3 CM
DOP CALCLVOT PEAK VEL VTI: 19 CM
E WAVE DECELERATION TIME: 377.17 MSEC
E/A RATIO: 0.78
E/E' RATIO: 15.08 M/S
ECHO LV POSTERIOR WALL: 1.1 CM (ref 0.6–1.1)
EGFR (NO RACE VARIABLE) (RUSH/TITUS): 30 ML/MIN/1.73M2
EGFR (NO RACE VARIABLE) (RUSH/TITUS): 32 ML/MIN/1.73M2
EGFR (NO RACE VARIABLE) (RUSH/TITUS): 32 ML/MIN/1.73M2
EGFR (NO RACE VARIABLE) (RUSH/TITUS): 38 ML/MIN/1.73M2
EGFR (NO RACE VARIABLE) (RUSH/TITUS): 42 ML/MIN/1.73M2
EGFR (NO RACE VARIABLE) (RUSH/TITUS): 43 ML/MIN/1.73M2
EGFR (NO RACE VARIABLE) (RUSH/TITUS): 54 ML/MIN/1.73M2
EGFR (NO RACE VARIABLE) (RUSH/TITUS): 55 ML/MIN/1.73M2
EGFR (NO RACE VARIABLE) (RUSH/TITUS): 82 ML/MIN/1.73M2
EGFR (NO RACE VARIABLE) (RUSH/TITUS): 84 ML/MIN/1.73M2
EGFR (NO RACE VARIABLE) (RUSH/TITUS): 87 ML/MIN/1.73M2
EOSINOPHIL # BLD AUTO: 0.01 K/UL (ref 0–0.5)
EOSINOPHIL # BLD AUTO: 0.02 K/UL (ref 0–0.5)
EOSINOPHIL # BLD AUTO: 0.05 K/UL (ref 0–0.5)
EOSINOPHIL # BLD AUTO: 0.07 K/UL (ref 0–0.5)
EOSINOPHIL NFR BLD AUTO: 0.1 % (ref 1–4)
EOSINOPHIL NFR BLD AUTO: 0.2 % (ref 1–4)
EOSINOPHIL NFR BLD AUTO: 0.4 % (ref 1–4)
EOSINOPHIL NFR BLD AUTO: 0.6 % (ref 1–4)
ERYTHROCYTE [DISTWIDTH] IN BLOOD BY AUTOMATED COUNT: 13.5 % (ref 11.5–14.5)
ERYTHROCYTE [DISTWIDTH] IN BLOOD BY AUTOMATED COUNT: 13.7 % (ref 11.5–14.5)
ERYTHROCYTE [DISTWIDTH] IN BLOOD BY AUTOMATED COUNT: 14 % (ref 11.5–14.5)
ERYTHROCYTE [DISTWIDTH] IN BLOOD BY AUTOMATED COUNT: 14.2 % (ref 11.5–14.5)
FRACTIONAL SHORTENING: 38.9 % (ref 28–44)
GLOBULIN SER-MCNC: 3.6 G/DL (ref 2–4)
GLUCOSE SERPL-MCNC: 103 MG/DL (ref 70–105)
GLUCOSE SERPL-MCNC: 115 MG/DL (ref 70–105)
GLUCOSE SERPL-MCNC: 115 MG/DL (ref 70–105)
GLUCOSE SERPL-MCNC: 117 MG/DL (ref 70–105)
GLUCOSE SERPL-MCNC: 121 MG/DL (ref 70–105)
GLUCOSE SERPL-MCNC: 125 MG/DL (ref 70–105)
GLUCOSE SERPL-MCNC: 127 MG/DL (ref 70–105)
GLUCOSE SERPL-MCNC: 129 MG/DL (ref 74–106)
GLUCOSE SERPL-MCNC: 132 MG/DL (ref 74–106)
GLUCOSE SERPL-MCNC: 138 MG/DL (ref 70–105)
GLUCOSE SERPL-MCNC: 139 MG/DL (ref 70–105)
GLUCOSE SERPL-MCNC: 140 MG/DL (ref 70–105)
GLUCOSE SERPL-MCNC: 141 MG/DL (ref 70–105)
GLUCOSE SERPL-MCNC: 141 MG/DL (ref 74–106)
GLUCOSE SERPL-MCNC: 144 MG/DL (ref 74–106)
GLUCOSE SERPL-MCNC: 145 MG/DL (ref 70–105)
GLUCOSE SERPL-MCNC: 145 MG/DL (ref 74–106)
GLUCOSE SERPL-MCNC: 147 MG/DL (ref 70–105)
GLUCOSE SERPL-MCNC: 153 MG/DL (ref 70–105)
GLUCOSE SERPL-MCNC: 160 MG/DL (ref 74–106)
GLUCOSE SERPL-MCNC: 165 MG/DL (ref 70–105)
GLUCOSE SERPL-MCNC: 168 MG/DL (ref 70–105)
GLUCOSE SERPL-MCNC: 178 MG/DL (ref 70–105)
GLUCOSE SERPL-MCNC: 181 MG/DL (ref 70–105)
GLUCOSE SERPL-MCNC: 184 MG/DL (ref 70–105)
GLUCOSE SERPL-MCNC: 185 MG/DL (ref 74–106)
GLUCOSE SERPL-MCNC: 191 MG/DL (ref 70–105)
GLUCOSE SERPL-MCNC: 192 MG/DL (ref 70–105)
GLUCOSE SERPL-MCNC: 204 MG/DL (ref 70–105)
GLUCOSE SERPL-MCNC: 207 MG/DL (ref 70–105)
GLUCOSE SERPL-MCNC: 208 MG/DL (ref 70–105)
GLUCOSE SERPL-MCNC: 216 MG/DL (ref 70–105)
GLUCOSE SERPL-MCNC: 217 MG/DL (ref 70–105)
GLUCOSE SERPL-MCNC: 228 MG/DL (ref 70–105)
GLUCOSE SERPL-MCNC: 238 MG/DL (ref 70–105)
GLUCOSE SERPL-MCNC: 243 MG/DL (ref 70–105)
GLUCOSE SERPL-MCNC: 257 MG/DL (ref 74–106)
GLUCOSE SERPL-MCNC: 263 MG/DL (ref 70–105)
GLUCOSE SERPL-MCNC: 264 MG/DL (ref 74–106)
GLUCOSE SERPL-MCNC: 288 MG/DL (ref 70–105)
GLUCOSE SERPL-MCNC: 317 MG/DL (ref 74–106)
GLUCOSE SERPL-MCNC: 329 MG/DL (ref 70–105)
GLUCOSE SERPL-MCNC: 356 MG/DL (ref 70–105)
GLUCOSE SERPL-MCNC: 357 MG/DL (ref 74–106)
GLUCOSE SERPL-MCNC: 365 MG/DL (ref 70–105)
GLUCOSE SERPL-MCNC: 369 MG/DL (ref 70–105)
GLUCOSE UR STRIP-MCNC: NORMAL MG/DL
GRAM STN SPEC: ABNORMAL
GRAM STN SPEC: ABNORMAL
HCO3 UR-SCNC: 29.2 MMOL/L (ref 21–28)
HCO3 UR-SCNC: 30.9 MMOL/L (ref 21–28)
HCO3 UR-SCNC: 31.1 MMOL/L (ref 21–28)
HCO3 UR-SCNC: 31.3 MMOL/L (ref 21–28)
HCT VFR BLD AUTO: 29.3 % (ref 38–47)
HCT VFR BLD AUTO: 30 % (ref 38–47)
HCT VFR BLD AUTO: 31.4 % (ref 38–47)
HCT VFR BLD AUTO: 41.2 % (ref 38–47)
HCT VFR BLD CALC: 40 % (ref 35–51)
HGB BLD-MCNC: 10 G/DL (ref 12–16)
HGB BLD-MCNC: 12.1 G/DL (ref 12–16)
HGB BLD-MCNC: 9.4 G/DL (ref 12–16)
HGB BLD-MCNC: 9.6 G/DL (ref 12–16)
HYALINE CASTS #/AREA URNS LPF: ABNORMAL /LPF
IMM GRANULOCYTES # BLD AUTO: 0.04 K/UL (ref 0–0.04)
IMM GRANULOCYTES # BLD AUTO: 0.04 K/UL (ref 0–0.04)
IMM GRANULOCYTES # BLD AUTO: 0.05 K/UL (ref 0–0.04)
IMM GRANULOCYTES # BLD AUTO: 0.3 K/UL (ref 0–0.04)
IMM GRANULOCYTES NFR BLD: 0.3 % (ref 0–0.4)
IMM GRANULOCYTES NFR BLD: 0.3 % (ref 0–0.4)
IMM GRANULOCYTES NFR BLD: 0.5 % (ref 0–0.4)
IMM GRANULOCYTES NFR BLD: 2.1 % (ref 0–0.4)
INDIRECT COOMBS: NORMAL
INR BLD: 1.28
INR BLD: 1.47
INR BLD: 1.52
INTERVENTRICULAR SEPTUM: 1.2 CM (ref 0.6–1.1)
KETONES UR STRIP-SCNC: ABNORMAL MG/DL
LACTATE SERPL-SCNC: 1.8 MMOL/L (ref 0.4–2)
LACTATE SERPL-SCNC: 2.1 MMOL/L (ref 0.4–2)
LACTATE SERPL-SCNC: 2.3 MMOL/L (ref 0.4–2)
LACTATE SERPL-SCNC: 2.7 MMOL/L (ref 0.4–2)
LACTATE SERPL-SCNC: 2.7 MMOL/L (ref 0.4–2)
LACTATE SERPL-SCNC: 5.2 MMOL/L (ref 0.4–2)
LACTATE SERPL-SCNC: 5.5 MMOL/L (ref 0.4–2)
LACTATE SERPL-SCNC: 5.9 MMOL/L (ref 0.4–2)
LDH SERPL L TO P-CCNC: 6.8 MMOL/L (ref 0.3–1.2)
LEFT INTERNAL DIMENSION IN SYSTOLE: 2.2 CM (ref 2.1–4)
LEFT VENTRICLE DIASTOLIC VOLUME INDEX: 34.09 ML/M2
LEFT VENTRICLE DIASTOLIC VOLUME: 55.22 ML
LEFT VENTRICLE MASS INDEX: 81.9 G/M2
LEFT VENTRICLE SYSTOLIC VOLUME INDEX: 10.3 ML/M2
LEFT VENTRICLE SYSTOLIC VOLUME: 16.65 ML
LEFT VENTRICULAR INTERNAL DIMENSION IN DIASTOLE: 3.6 CM (ref 3.5–6)
LEFT VENTRICULAR MASS: 132.7 G
LEUKOCYTE ESTERASE UR QL STRIP: ABNORMAL
LV LATERAL E/E' RATIO: 14 M/S
LV SEPTAL E/E' RATIO: 16.33 M/S
LVED V (TEICH): 55.22 ML
LVES V (TEICH): 16.65 ML
LVOT MG: 1.59 MMHG
LVOT MV: 0.61 CM/S
LYMPHOCYTES # BLD AUTO: 0.99 K/UL (ref 1–4.8)
LYMPHOCYTES # BLD AUTO: 1.4 K/UL (ref 1–4.8)
LYMPHOCYTES # BLD AUTO: 1.69 K/UL (ref 1–4.8)
LYMPHOCYTES # BLD AUTO: 2.08 K/UL (ref 1–4.8)
LYMPHOCYTES NFR BLD AUTO: 10.5 % (ref 27–41)
LYMPHOCYTES NFR BLD AUTO: 14.7 % (ref 27–41)
LYMPHOCYTES NFR BLD AUTO: 18.1 % (ref 27–41)
LYMPHOCYTES NFR BLD AUTO: 9.8 % (ref 27–41)
MAGNESIUM SERPL-MCNC: 2 MG/DL (ref 1.7–2.3)
MAGNESIUM SERPL-MCNC: 2.2 MG/DL (ref 1.7–2.3)
MAGNESIUM SERPL-MCNC: 2.6 MG/DL (ref 1.7–2.3)
MAGNESIUM SERPL-MCNC: 2.7 MG/DL (ref 1.7–2.3)
MAGNESIUM SERPL-MCNC: 2.7 MG/DL (ref 1.7–2.3)
MAGNESIUM SERPL-MCNC: 2.8 MG/DL (ref 1.7–2.3)
MAGNESIUM SERPL-MCNC: 2.9 MG/DL (ref 1.7–2.3)
MCH RBC QN AUTO: 29.4 PG (ref 27–31)
MCH RBC QN AUTO: 29.5 PG (ref 27–31)
MCH RBC QN AUTO: 29.9 PG (ref 27–31)
MCH RBC QN AUTO: 29.9 PG (ref 27–31)
MCHC RBC AUTO-ENTMCNC: 29.4 G/DL (ref 32–36)
MCHC RBC AUTO-ENTMCNC: 31.8 G/DL (ref 32–36)
MCHC RBC AUTO-ENTMCNC: 32 G/DL (ref 32–36)
MCHC RBC AUTO-ENTMCNC: 32.1 G/DL (ref 32–36)
MCV RBC AUTO: 101.7 FL (ref 80–96)
MCV RBC AUTO: 91.8 FL (ref 80–96)
MCV RBC AUTO: 92 FL (ref 80–96)
MCV RBC AUTO: 94 FL (ref 80–96)
MONOCYTES # BLD AUTO: 0.49 K/UL (ref 0–0.8)
MONOCYTES # BLD AUTO: 0.57 K/UL (ref 0–0.8)
MONOCYTES # BLD AUTO: 0.58 K/UL (ref 0–0.8)
MONOCYTES # BLD AUTO: 0.64 K/UL (ref 0–0.8)
MONOCYTES NFR BLD AUTO: 4 % (ref 2–6)
MONOCYTES NFR BLD AUTO: 5 % (ref 2–6)
MONOCYTES NFR BLD AUTO: 5.2 % (ref 2–6)
MONOCYTES NFR BLD AUTO: 5.6 % (ref 2–6)
MPC BLD CALC-MCNC: 10.9 FL (ref 9.4–12.4)
MPC BLD CALC-MCNC: 11.1 FL (ref 9.4–12.4)
MPC BLD CALC-MCNC: 11.2 FL (ref 9.4–12.4)
MPC BLD CALC-MCNC: 11.6 FL (ref 9.4–12.4)
MUCOUS, UA: ABNORMAL /LPF
MV MEAN GRADIENT: 2 MMHG
MV PEAK A VEL: 1.26 M/S
MV PEAK E VEL: 0.98 M/S
MV PEAK GRADIENT: 6 MMHG
MV VALVE AREA BY CONTINUITY EQUATION: 1.53 CM2
NEUTROPHILS # BLD AUTO: 11.96 K/UL (ref 1.8–7.7)
NEUTROPHILS # BLD AUTO: 7.88 K/UL (ref 1.8–7.7)
NEUTROPHILS # BLD AUTO: 8.69 K/UL (ref 1.8–7.7)
NEUTROPHILS # BLD AUTO: 9.05 K/UL (ref 1.8–7.7)
NEUTROPHILS NFR BLD AUTO: 75.7 % (ref 53–65)
NEUTROPHILS NFR BLD AUTO: 78.8 % (ref 53–65)
NEUTROPHILS NFR BLD AUTO: 83.5 % (ref 53–65)
NEUTROPHILS NFR BLD AUTO: 83.7 % (ref 53–65)
NITRITE UR QL STRIP: NEGATIVE
NRBC # BLD AUTO: 0 X10E3/UL
NRBC, AUTO (.00): 0 %
NT-PROBNP SERPL-MCNC: 5510 PG/ML (ref 1–450)
OHS CV RV/LV RATIO: 0.92 CM
OHS QRS DURATION: 112 MS
OHS QRS DURATION: 90 MS
OHS QTC CALCULATION: 448 MS
OHS QTC CALCULATION: 475 MS
PCO2 BLDA: 41 MMHG (ref 35–48)
PCO2 BLDA: 44 MMHG (ref 35–48)
PCO2 BLDA: 55 MMHG (ref 35–48)
PCO2 BLDA: 56 MMHG (ref 35–48)
PH SMN: 7.35 [PH] (ref 7.35–7.45)
PH SMN: 7.36 [PH] (ref 7.35–7.45)
PH SMN: 7.46 [PH] (ref 7.35–7.45)
PH SMN: 7.46 [PH] (ref 7.35–7.45)
PH UR STRIP: 5.5 PH UNITS
PHOSPHATE SERPL-MCNC: 1.4 MG/DL (ref 2.5–4.5)
PHOSPHATE SERPL-MCNC: 1.7 MG/DL (ref 2.5–4.5)
PHOSPHATE SERPL-MCNC: 2.7 MG/DL (ref 2.5–4.5)
PHOSPHATE SERPL-MCNC: 3.1 MG/DL (ref 2.5–4.5)
PHOSPHATE SERPL-MCNC: 3.1 MG/DL (ref 2.5–4.5)
PHOSPHATE SERPL-MCNC: 3.2 MG/DL (ref 2.5–4.5)
PHOSPHATE SERPL-MCNC: 3.3 MG/DL (ref 2.5–4.5)
PHOSPHATE SERPL-MCNC: 5.2 MG/DL (ref 2.5–4.5)
PHOSPHATE SERPL-MCNC: 6.4 MG/DL (ref 2.5–4.5)
PISA TR MAX VEL: 3.16 M/S
PLATELET # BLD AUTO: 140 K/UL (ref 150–400)
PLATELET # BLD AUTO: 154 K/UL (ref 150–400)
PLATELET # BLD AUTO: 154 K/UL (ref 150–400)
PLATELET # BLD AUTO: 190 K/UL (ref 150–400)
PO2 BLDA: 202 MMHG (ref 83–108)
PO2 BLDA: 47 MMHG (ref 83–108)
PO2 BLDA: 56 MMHG (ref 83–108)
PO2 BLDA: 89 MMHG (ref 83–108)
POC BASE EXCESS: 4 MMOL/L (ref -2–3)
POC BASE EXCESS: 4.4 MMOL/L (ref -2–3)
POC BASE EXCESS: 4.9 MMOL/L (ref -2–3)
POC BASE EXCESS: 6.6 MMOL/L (ref -2–3)
POC CO2: 30.5 MMOL/L
POC IONIZED CALCIUM: 0.93 MMOL/L (ref 1.15–1.35)
POC SATURATED O2: 100 % (ref 95–98)
POC SATURATED O2: 80 % (ref 95–98)
POC SATURATED O2: 87 % (ref 95–98)
POC SATURATED O2: 97 % (ref 95–98)
POCT GLUCOSE: 357 MG/DL (ref 60–95)
POTASSIUM BLD-SCNC: 6.6 MMOL/L (ref 3.4–4.5)
POTASSIUM SERPL-SCNC: 2.8 MMOL/L (ref 3.5–5.1)
POTASSIUM SERPL-SCNC: 3.1 MMOL/L (ref 3.5–5.1)
POTASSIUM SERPL-SCNC: 3.6 MMOL/L (ref 3.5–5.1)
POTASSIUM SERPL-SCNC: 3.6 MMOL/L (ref 3.5–5.1)
POTASSIUM SERPL-SCNC: 3.7 MMOL/L (ref 3.5–5.1)
POTASSIUM SERPL-SCNC: 3.7 MMOL/L (ref 3.5–5.1)
POTASSIUM SERPL-SCNC: 4 MMOL/L (ref 3.5–5.1)
POTASSIUM SERPL-SCNC: 4.8 MMOL/L (ref 3.5–5.1)
POTASSIUM SERPL-SCNC: 5 MMOL/L (ref 3.5–5.1)
POTASSIUM SERPL-SCNC: 5.2 MMOL/L (ref 3.5–5.1)
PROT SERPL-MCNC: 6.4 G/DL (ref 6.4–8.2)
PROT UR QL STRIP: 70
PROTHROMBIN TIME: 15.9 SECONDS (ref 11.7–14.7)
PROTHROMBIN TIME: 17.7 SECONDS (ref 11.7–14.7)
PROTHROMBIN TIME: 18.1 SECONDS (ref 11.7–14.7)
PV PEAK GRADIENT: 2 MMHG
PV PEAK VELOCITY: 0.66 M/S
RA PRESSURE ESTIMATED: 3 MMHG
RA VOL SYS: 30.2 ML
RBC # BLD AUTO: 3.19 M/UL (ref 4.2–5.4)
RBC # BLD AUTO: 3.26 M/UL (ref 4.2–5.4)
RBC # BLD AUTO: 3.34 M/UL (ref 4.2–5.4)
RBC # BLD AUTO: 4.05 M/UL (ref 4.2–5.4)
RBC # UR STRIP: NEGATIVE /UL
RBC #/AREA URNS HPF: 10 /HPF
RH BLD: NORMAL
RIGHT ATRIAL AREA: 15 CM2
RIGHT ATRIUM VOLUME AREA LENGTH APICAL 4 CHAMBER: 30.55 ML
RIGHT VENTRICLE DIASTOLIC BASEL DIMENSION: 3.3 CM
RIGHT VENTRICLE DIASTOLIC LENGTH: 5.9 CM
RIGHT VENTRICLE DIASTOLIC MID DIMENSION: 2.2 CM
RIGHT VENTRICULAR LENGTH IN DIASTOLE (APICAL 4-CHAMBER VIEW): 5.85 CM
RV MID DIAMA: 2.21 CM
RV TB RVSP: 6 MMHG
SODIUM BLD-SCNC: 136 MMOL/L (ref 136–145)
SODIUM SERPL-SCNC: 131 MMOL/L (ref 136–145)
SODIUM SERPL-SCNC: 132 MMOL/L (ref 136–145)
SODIUM SERPL-SCNC: 134 MMOL/L (ref 136–145)
SODIUM SERPL-SCNC: 135 MMOL/L (ref 136–145)
SODIUM SERPL-SCNC: 136 MMOL/L (ref 136–145)
SODIUM SERPL-SCNC: 138 MMOL/L (ref 136–145)
SODIUM SERPL-SCNC: 139 MMOL/L (ref 136–145)
SP GR UR STRIP: 1.03
SPECIMEN OUTDATE: NORMAL
SQUAMOUS #/AREA URNS LPF: ABNORMAL /HPF
T4 FREE SERPL-MCNC: 1.31 NG/DL (ref 0.76–1.46)
TDI LATERAL: 0.07 M/S
TDI SEPTAL: 0.06 M/S
TDI: 0.07 M/S
TR MAX PG: 40 MMHG
TRICUSPID ANNULAR PLANE SYSTOLIC EXCURSION: 2.5 CM
TROPONIN I SERPL DL<=0.01 NG/ML-MCNC: 129.7 PG/ML
TROPONIN I SERPL DL<=0.01 NG/ML-MCNC: 26.5 PG/ML
TSH SERPL DL<=0.005 MIU/L-ACNC: 0.32 UIU/ML (ref 0.36–3.74)
TV REST PULMONARY ARTERY PRESSURE: 43 MMHG
UA COMPLETE W REFLEX CULTURE PNL UR: ABNORMAL
UROBILINOGEN UR STRIP-ACNC: 2 MG/DL
VANCOMYCIN TROUGH SERPL-MCNC: 6.4 ΜG/ML (ref 10–20)
WBC # BLD AUTO: 11.49 K/UL (ref 4.5–11)
WBC # BLD AUTO: 11.49 K/UL (ref 4.5–11)
WBC # BLD AUTO: 14.29 K/UL (ref 4.5–11)
WBC # BLD AUTO: 9.44 K/UL (ref 4.5–11)
WBC #/AREA URNS HPF: 115 /HPF
Z-SCORE OF LEFT VENTRICULAR DIMENSION IN END DIASTOLE: -2.41
Z-SCORE OF LEFT VENTRICULAR DIMENSION IN END SYSTOLE: -2.02

## 2024-01-01 PROCEDURE — 99900026 HC AIRWAY MAINTENANCE (STAT)

## 2024-01-01 PROCEDURE — 27000221 HC OXYGEN, UP TO 24 HOURS

## 2024-01-01 PROCEDURE — 84100 ASSAY OF PHOSPHORUS: CPT | Performed by: EMERGENCY MEDICINE

## 2024-01-01 PROCEDURE — 99233 SBSQ HOSP IP/OBS HIGH 50: CPT | Mod: ,,, | Performed by: INTERNAL MEDICINE

## 2024-01-01 PROCEDURE — 80048 BASIC METABOLIC PNL TOTAL CA: CPT | Performed by: STUDENT IN AN ORGANIZED HEALTH CARE EDUCATION/TRAINING PROGRAM

## 2024-01-01 PROCEDURE — 82962 GLUCOSE BLOOD TEST: CPT

## 2024-01-01 PROCEDURE — 86901 BLOOD TYPING SEROLOGIC RH(D): CPT | Performed by: EMERGENCY MEDICINE

## 2024-01-01 PROCEDURE — 83735 ASSAY OF MAGNESIUM: CPT | Performed by: EMERGENCY MEDICINE

## 2024-01-01 PROCEDURE — 85610 PROTHROMBIN TIME: CPT | Performed by: EMERGENCY MEDICINE

## 2024-01-01 PROCEDURE — 84100 ASSAY OF PHOSPHORUS: CPT | Performed by: INTERNAL MEDICINE

## 2024-01-01 PROCEDURE — 25000003 PHARM REV CODE 250: Performed by: STUDENT IN AN ORGANIZED HEALTH CARE EDUCATION/TRAINING PROGRAM

## 2024-01-01 PROCEDURE — 99291 CRITICAL CARE FIRST HOUR: CPT | Mod: ,,, | Performed by: INTERNAL MEDICINE

## 2024-01-01 PROCEDURE — 32551 INSERTION OF CHEST TUBE: CPT | Mod: LT,,, | Performed by: STUDENT IN AN ORGANIZED HEALTH CARE EDUCATION/TRAINING PROGRAM

## 2024-01-01 PROCEDURE — 94003 VENT MGMT INPAT SUBQ DAY: CPT

## 2024-01-01 PROCEDURE — 94799 UNLISTED PULMONARY SVC/PX: CPT

## 2024-01-01 PROCEDURE — 94761 N-INVAS EAR/PLS OXIMETRY MLT: CPT

## 2024-01-01 PROCEDURE — 86850 RBC ANTIBODY SCREEN: CPT | Performed by: EMERGENCY MEDICINE

## 2024-01-01 PROCEDURE — 80202 ASSAY OF VANCOMYCIN: CPT | Performed by: STUDENT IN AN ORGANIZED HEALTH CARE EDUCATION/TRAINING PROGRAM

## 2024-01-01 PROCEDURE — 25000003 PHARM REV CODE 250

## 2024-01-01 PROCEDURE — 99900035 HC TECH TIME PER 15 MIN (STAT)

## 2024-01-01 PROCEDURE — C1751 CATH, INF, PER/CENT/MIDLINE: HCPCS

## 2024-01-01 PROCEDURE — 63600175 PHARM REV CODE 636 W HCPCS: Performed by: NURSE PRACTITIONER

## 2024-01-01 PROCEDURE — 83735 ASSAY OF MAGNESIUM: CPT | Performed by: INTERNAL MEDICINE

## 2024-01-01 PROCEDURE — 63600175 PHARM REV CODE 636 W HCPCS: Performed by: STUDENT IN AN ORGANIZED HEALTH CARE EDUCATION/TRAINING PROGRAM

## 2024-01-01 PROCEDURE — 36415 COLL VENOUS BLD VENIPUNCTURE: CPT | Performed by: STUDENT IN AN ORGANIZED HEALTH CARE EDUCATION/TRAINING PROGRAM

## 2024-01-01 PROCEDURE — 25000003 PHARM REV CODE 250: Performed by: EMERGENCY MEDICINE

## 2024-01-01 PROCEDURE — 83605 ASSAY OF LACTIC ACID: CPT

## 2024-01-01 PROCEDURE — 63600175 PHARM REV CODE 636 W HCPCS: Performed by: INTERNAL MEDICINE

## 2024-01-01 PROCEDURE — 84443 ASSAY THYROID STIM HORMONE: CPT | Performed by: STUDENT IN AN ORGANIZED HEALTH CARE EDUCATION/TRAINING PROGRAM

## 2024-01-01 PROCEDURE — 27000207 HC ISOLATION

## 2024-01-01 PROCEDURE — 02HV33Z INSERTION OF INFUSION DEVICE INTO SUPERIOR VENA CAVA, PERCUTANEOUS APPROACH: ICD-10-PCS | Performed by: STUDENT IN AN ORGANIZED HEALTH CARE EDUCATION/TRAINING PROGRAM

## 2024-01-01 PROCEDURE — 85025 COMPLETE CBC W/AUTO DIFF WBC: CPT | Performed by: STUDENT IN AN ORGANIZED HEALTH CARE EDUCATION/TRAINING PROGRAM

## 2024-01-01 PROCEDURE — 84295 ASSAY OF SERUM SODIUM: CPT

## 2024-01-01 PROCEDURE — 25000003 PHARM REV CODE 250: Performed by: INTERNAL MEDICINE

## 2024-01-01 PROCEDURE — 93005 ELECTROCARDIOGRAM TRACING: CPT

## 2024-01-01 PROCEDURE — 51702 INSERT TEMP BLADDER CATH: CPT

## 2024-01-01 PROCEDURE — 84484 ASSAY OF TROPONIN QUANT: CPT | Performed by: EMERGENCY MEDICINE

## 2024-01-01 PROCEDURE — 31500 INSERT EMERGENCY AIRWAY: CPT

## 2024-01-01 PROCEDURE — 36415 COLL VENOUS BLD VENIPUNCTURE: CPT | Performed by: INTERNAL MEDICINE

## 2024-01-01 PROCEDURE — 92950 HEART/LUNG RESUSCITATION CPR: CPT

## 2024-01-01 PROCEDURE — 85025 COMPLETE CBC W/AUTO DIFF WBC: CPT | Performed by: EMERGENCY MEDICINE

## 2024-01-01 PROCEDURE — 82803 BLOOD GASES ANY COMBINATION: CPT

## 2024-01-01 PROCEDURE — 36415 COLL VENOUS BLD VENIPUNCTURE: CPT | Performed by: EMERGENCY MEDICINE

## 2024-01-01 PROCEDURE — 95822 EEG COMA OR SLEEP ONLY: CPT | Mod: 26,,, | Performed by: INTERNAL MEDICINE

## 2024-01-01 PROCEDURE — 80048 BASIC METABOLIC PNL TOTAL CA: CPT | Performed by: INTERNAL MEDICINE

## 2024-01-01 PROCEDURE — 20000000 HC ICU ROOM

## 2024-01-01 PROCEDURE — 36600 WITHDRAWAL OF ARTERIAL BLOOD: CPT

## 2024-01-01 PROCEDURE — 93010 ELECTROCARDIOGRAM REPORT: CPT | Mod: 76,,, | Performed by: INTERNAL MEDICINE

## 2024-01-01 PROCEDURE — 84132 ASSAY OF SERUM POTASSIUM: CPT

## 2024-01-01 PROCEDURE — 5A2204Z RESTORATION OF CARDIAC RHYTHM, SINGLE: ICD-10-PCS | Performed by: EMERGENCY MEDICINE

## 2024-01-01 PROCEDURE — 87040 BLOOD CULTURE FOR BACTERIA: CPT | Performed by: EMERGENCY MEDICINE

## 2024-01-01 PROCEDURE — 25000003 PHARM REV CODE 250: Performed by: NURSE PRACTITIONER

## 2024-01-01 PROCEDURE — 0W9B30Z DRAINAGE OF LEFT PLEURAL CAVITY WITH DRAINAGE DEVICE, PERCUTANEOUS APPROACH: ICD-10-PCS | Performed by: STUDENT IN AN ORGANIZED HEALTH CARE EDUCATION/TRAINING PROGRAM

## 2024-01-01 PROCEDURE — 87086 URINE CULTURE/COLONY COUNT: CPT | Performed by: EMERGENCY MEDICINE

## 2024-01-01 PROCEDURE — 36556 INSERT NON-TUNNEL CV CATH: CPT

## 2024-01-01 PROCEDURE — 83880 ASSAY OF NATRIURETIC PEPTIDE: CPT | Performed by: EMERGENCY MEDICINE

## 2024-01-01 PROCEDURE — 87205 SMEAR GRAM STAIN: CPT | Performed by: STUDENT IN AN ORGANIZED HEALTH CARE EDUCATION/TRAINING PROGRAM

## 2024-01-01 PROCEDURE — 63600175 PHARM REV CODE 636 W HCPCS: Performed by: EMERGENCY MEDICINE

## 2024-01-01 PROCEDURE — 82947 ASSAY GLUCOSE BLOOD QUANT: CPT

## 2024-01-01 PROCEDURE — P9047 ALBUMIN (HUMAN), 25%, 50ML: HCPCS | Mod: JZ,JG | Performed by: STUDENT IN AN ORGANIZED HEALTH CARE EDUCATION/TRAINING PROGRAM

## 2024-01-01 PROCEDURE — 82947 ASSAY GLUCOSE BLOOD QUANT: CPT | Performed by: STUDENT IN AN ORGANIZED HEALTH CARE EDUCATION/TRAINING PROGRAM

## 2024-01-01 PROCEDURE — 96375 TX/PRO/DX INJ NEW DRUG ADDON: CPT

## 2024-01-01 PROCEDURE — 82550 ASSAY OF CK (CPK): CPT | Performed by: EMERGENCY MEDICINE

## 2024-01-01 PROCEDURE — 94002 VENT MGMT INPAT INIT DAY: CPT

## 2024-01-01 PROCEDURE — 87070 CULTURE OTHR SPECIMN AEROBIC: CPT | Performed by: STUDENT IN AN ORGANIZED HEALTH CARE EDUCATION/TRAINING PROGRAM

## 2024-01-01 PROCEDURE — 83605 ASSAY OF LACTIC ACID: CPT | Performed by: EMERGENCY MEDICINE

## 2024-01-01 PROCEDURE — 85730 THROMBOPLASTIN TIME PARTIAL: CPT | Performed by: EMERGENCY MEDICINE

## 2024-01-01 PROCEDURE — 85014 HEMATOCRIT: CPT

## 2024-01-01 PROCEDURE — 0BH17EZ INSERTION OF ENDOTRACHEAL AIRWAY INTO TRACHEA, VIA NATURAL OR ARTIFICIAL OPENING: ICD-10-PCS | Performed by: EMERGENCY MEDICINE

## 2024-01-01 PROCEDURE — 36556 INSERT NON-TUNNEL CV CATH: CPT | Mod: 51,,, | Performed by: STUDENT IN AN ORGANIZED HEALTH CARE EDUCATION/TRAINING PROGRAM

## 2024-01-01 PROCEDURE — 99291 CRITICAL CARE FIRST HOUR: CPT | Mod: 25,,, | Performed by: STUDENT IN AN ORGANIZED HEALTH CARE EDUCATION/TRAINING PROGRAM

## 2024-01-01 PROCEDURE — 5A1955Z RESPIRATORY VENTILATION, GREATER THAN 96 CONSECUTIVE HOURS: ICD-10-PCS | Performed by: EMERGENCY MEDICINE

## 2024-01-01 PROCEDURE — 95813 EEG EXTND MNTR 61-119 MIN: CPT

## 2024-01-01 PROCEDURE — 27201640 HC PAD, ARTICGEL

## 2024-01-01 PROCEDURE — 99291 CRITICAL CARE FIRST HOUR: CPT

## 2024-01-01 PROCEDURE — 96365 THER/PROPH/DIAG IV INF INIT: CPT

## 2024-01-01 PROCEDURE — 93010 ELECTROCARDIOGRAM REPORT: CPT | Mod: ,,, | Performed by: INTERNAL MEDICINE

## 2024-01-01 PROCEDURE — 81003 URINALYSIS AUTO W/O SCOPE: CPT | Performed by: EMERGENCY MEDICINE

## 2024-01-01 PROCEDURE — 82330 ASSAY OF CALCIUM: CPT

## 2024-01-01 PROCEDURE — 87077 CULTURE AEROBIC IDENTIFY: CPT | Performed by: STUDENT IN AN ORGANIZED HEALTH CARE EDUCATION/TRAINING PROGRAM

## 2024-01-01 PROCEDURE — 99292 CRITICAL CARE ADDL 30 MIN: CPT | Mod: 25,,, | Performed by: STUDENT IN AN ORGANIZED HEALTH CARE EDUCATION/TRAINING PROGRAM

## 2024-01-01 PROCEDURE — 80053 COMPREHEN METABOLIC PANEL: CPT | Performed by: EMERGENCY MEDICINE

## 2024-01-01 PROCEDURE — 25500020 PHARM REV CODE 255: Performed by: STUDENT IN AN ORGANIZED HEALTH CARE EDUCATION/TRAINING PROGRAM

## 2024-01-01 PROCEDURE — 82565 ASSAY OF CREATININE: CPT | Performed by: INTERNAL MEDICINE

## 2024-01-01 PROCEDURE — 86900 BLOOD TYPING SEROLOGIC ABO: CPT | Performed by: EMERGENCY MEDICINE

## 2024-01-01 PROCEDURE — 84439 ASSAY OF FREE THYROXINE: CPT | Performed by: STUDENT IN AN ORGANIZED HEALTH CARE EDUCATION/TRAINING PROGRAM

## 2024-01-01 PROCEDURE — 84484 ASSAY OF TROPONIN QUANT: CPT | Performed by: STUDENT IN AN ORGANIZED HEALTH CARE EDUCATION/TRAINING PROGRAM

## 2024-01-01 PROCEDURE — 96360 HYDRATION IV INFUSION INIT: CPT | Mod: 59

## 2024-01-01 PROCEDURE — 81001 URINALYSIS AUTO W/SCOPE: CPT | Performed by: EMERGENCY MEDICINE

## 2024-01-01 PROCEDURE — 27202364 HC PAD, COOLING, ANY SIZE

## 2024-01-01 RX ORDER — NOREPINEPHRINE BITARTRATE/D5W 4MG/250ML
0-3 PLASTIC BAG, INJECTION (ML) INTRAVENOUS CONTINUOUS
Status: DISCONTINUED | OUTPATIENT
Start: 2024-01-01 | End: 2024-01-01 | Stop reason: CLARIF

## 2024-01-01 RX ORDER — ATROPINE SULFATE 0.1 MG/ML
INJECTION INTRAVENOUS
Status: DISCONTINUED
Start: 2024-01-01 | End: 2024-01-01 | Stop reason: WASHOUT

## 2024-01-01 RX ORDER — EPINEPHRINE 0.1 MG/ML
INJECTION INTRAVENOUS
Status: COMPLETED
Start: 2024-01-01 | End: 2024-01-01

## 2024-01-01 RX ORDER — MUPIROCIN 20 MG/G
OINTMENT TOPICAL 2 TIMES DAILY
Status: COMPLETED | OUTPATIENT
Start: 2024-01-01 | End: 2024-01-01

## 2024-01-01 RX ORDER — ATROPINE SULFATE 0.1 MG/ML
INJECTION INTRAVENOUS
Status: DISPENSED
Start: 2024-01-01 | End: 2024-01-01

## 2024-01-01 RX ORDER — MORPHINE SULFATE 4 MG/ML
4 INJECTION, SOLUTION INTRAMUSCULAR; INTRAVENOUS
Status: DISCONTINUED | OUTPATIENT
Start: 2024-01-01 | End: 2024-01-01

## 2024-01-01 RX ORDER — MORPHINE SULFATE 4 MG/ML
8 INJECTION, SOLUTION INTRAMUSCULAR; INTRAVENOUS
Status: DISCONTINUED | OUTPATIENT
Start: 2024-01-01 | End: 2024-01-01 | Stop reason: HOSPADM

## 2024-01-01 RX ORDER — FUROSEMIDE 10 MG/ML
80 INJECTION INTRAMUSCULAR; INTRAVENOUS ONCE
Status: DISCONTINUED | OUTPATIENT
Start: 2024-01-01 | End: 2024-01-01

## 2024-01-01 RX ORDER — PANTOPRAZOLE SODIUM 40 MG/10ML
80 INJECTION, POWDER, LYOPHILIZED, FOR SOLUTION INTRAVENOUS ONCE
Status: COMPLETED | OUTPATIENT
Start: 2024-01-01 | End: 2024-01-01

## 2024-01-01 RX ORDER — ATROPINE SULFATE 0.1 MG/ML
1 INJECTION INTRAVENOUS ONCE
Status: COMPLETED | OUTPATIENT
Start: 2024-01-01 | End: 2024-01-01

## 2024-01-01 RX ORDER — MAGNESIUM SULFATE HEPTAHYDRATE 40 MG/ML
2 INJECTION, SOLUTION INTRAVENOUS ONCE
Status: COMPLETED | OUTPATIENT
Start: 2024-01-01 | End: 2024-01-01

## 2024-01-01 RX ORDER — FUROSEMIDE 10 MG/ML
INJECTION INTRAMUSCULAR; INTRAVENOUS
Status: DISCONTINUED
Start: 2024-01-01 | End: 2024-01-01 | Stop reason: WASHOUT

## 2024-01-01 RX ORDER — BUSPIRONE HYDROCHLORIDE 5 MG/1
10 TABLET ORAL 3 TIMES DAILY
Status: DISPENSED | OUTPATIENT
Start: 2024-01-01 | End: 2024-01-01

## 2024-01-01 RX ORDER — SODIUM CHLORIDE 0.9 % (FLUSH) 0.9 %
10 SYRINGE (ML) INJECTION
Status: DISCONTINUED | OUTPATIENT
Start: 2024-01-01 | End: 2024-01-01

## 2024-01-01 RX ORDER — BUSPIRONE HYDROCHLORIDE 7.5 MG/1
30 TABLET ORAL EVERY 8 HOURS
Status: DISCONTINUED | OUTPATIENT
Start: 2024-01-01 | End: 2024-01-01

## 2024-01-01 RX ORDER — FENTANYL CITRAT/DEXTROSE 5%/PF 100 MCG/10
0-300 PATIENT CONTROLLED ANALGESIA SYRINGE INTRAVENOUS CONTINUOUS
Status: DISCONTINUED | OUTPATIENT
Start: 2024-01-01 | End: 2024-01-01

## 2024-01-01 RX ORDER — AMIODARONE HYDROCHLORIDE 150 MG/3ML
INJECTION, SOLUTION INTRAVENOUS CODE/TRAUMA/SEDATION MEDICATION
Status: COMPLETED | OUTPATIENT
Start: 2024-01-01 | End: 2024-01-01

## 2024-01-01 RX ORDER — INSULIN ASPART 100 [IU]/ML
0-10 INJECTION, SOLUTION INTRAVENOUS; SUBCUTANEOUS EVERY 6 HOURS PRN
Status: DISCONTINUED | OUTPATIENT
Start: 2024-01-01 | End: 2024-01-01 | Stop reason: HOSPADM

## 2024-01-01 RX ORDER — NOREPINEPHRINE BITARTRATE/D5W 4MG/250ML
0-3 PLASTIC BAG, INJECTION (ML) INTRAVENOUS CONTINUOUS
Status: DISCONTINUED | OUTPATIENT
Start: 2024-01-01 | End: 2024-01-01

## 2024-01-01 RX ORDER — PANTOPRAZOLE SODIUM 40 MG/10ML
40 INJECTION, POWDER, LYOPHILIZED, FOR SOLUTION INTRAVENOUS DAILY
Status: DISCONTINUED | OUTPATIENT
Start: 2024-01-01 | End: 2024-01-01

## 2024-01-01 RX ORDER — INSULIN GLARGINE 100 [IU]/ML
10 INJECTION, SOLUTION SUBCUTANEOUS NIGHTLY
Status: DISCONTINUED | OUTPATIENT
Start: 2024-01-01 | End: 2024-01-01

## 2024-01-01 RX ORDER — ONDANSETRON HYDROCHLORIDE 2 MG/ML
4 INJECTION, SOLUTION INTRAVENOUS EVERY 8 HOURS PRN
Status: DISCONTINUED | OUTPATIENT
Start: 2024-01-01 | End: 2024-01-01 | Stop reason: HOSPADM

## 2024-01-01 RX ORDER — POTASSIUM CHLORIDE 7.45 MG/ML
10 INJECTION INTRAVENOUS ONCE
Status: COMPLETED | OUTPATIENT
Start: 2024-01-01 | End: 2024-01-01

## 2024-01-01 RX ORDER — EPINEPHRINE 0.1 MG/ML
INJECTION INTRAVENOUS CODE/TRAUMA/SEDATION MEDICATION
Status: COMPLETED | OUTPATIENT
Start: 2024-01-01 | End: 2024-01-01

## 2024-01-01 RX ORDER — SODIUM BICARBONATE 1 MEQ/ML
SYRINGE (ML) INTRAVENOUS CODE/TRAUMA/SEDATION MEDICATION
Status: COMPLETED | OUTPATIENT
Start: 2024-01-01 | End: 2024-01-01

## 2024-01-01 RX ORDER — LORAZEPAM 2 MG/ML
2 INJECTION INTRAMUSCULAR
Status: DISCONTINUED | OUTPATIENT
Start: 2024-01-01 | End: 2024-01-01 | Stop reason: HOSPADM

## 2024-01-01 RX ORDER — ALBUMIN HUMAN 250 G/1000ML
25 SOLUTION INTRAVENOUS ONCE
Status: COMPLETED | OUTPATIENT
Start: 2024-01-01 | End: 2024-01-01

## 2024-01-01 RX ORDER — FENTANYL CITRATE 50 UG/ML
25 INJECTION, SOLUTION INTRAMUSCULAR; INTRAVENOUS
Status: DISCONTINUED | OUTPATIENT
Start: 2024-01-01 | End: 2024-01-01 | Stop reason: HOSPADM

## 2024-01-01 RX ORDER — ACETAMINOPHEN 325 MG/1
650 TABLET ORAL EVERY 6 HOURS PRN
Status: DISCONTINUED | OUTPATIENT
Start: 2024-01-01 | End: 2024-01-01 | Stop reason: HOSPADM

## 2024-01-01 RX ORDER — ENOXAPARIN SODIUM 100 MG/ML
40 INJECTION SUBCUTANEOUS EVERY 24 HOURS
Status: DISCONTINUED | OUTPATIENT
Start: 2024-01-01 | End: 2024-01-01

## 2024-01-01 RX ORDER — GLUCAGON 1 MG
1 KIT INJECTION
Status: DISCONTINUED | OUTPATIENT
Start: 2024-01-01 | End: 2024-01-01 | Stop reason: HOSPADM

## 2024-01-01 RX ORDER — PROPOFOL 10 MG/ML
0-50 INJECTION, EMULSION INTRAVENOUS CONTINUOUS
Status: DISCONTINUED | OUTPATIENT
Start: 2024-01-01 | End: 2024-01-01

## 2024-01-01 RX ORDER — SODIUM CHLORIDE, SODIUM GLUCONATE, SODIUM ACETATE, POTASSIUM CHLORIDE AND MAGNESIUM CHLORIDE 30; 37; 368; 526; 502 MG/100ML; MG/100ML; MG/100ML; MG/100ML; MG/100ML
INJECTION, SOLUTION INTRAVENOUS CONTINUOUS
Status: DISCONTINUED | OUTPATIENT
Start: 2024-01-01 | End: 2024-01-01

## 2024-01-01 RX ORDER — IOPAMIDOL 755 MG/ML
100 INJECTION, SOLUTION INTRAVASCULAR
Status: COMPLETED | OUTPATIENT
Start: 2024-01-01 | End: 2024-01-01

## 2024-01-01 RX ORDER — SCOLOPAMINE TRANSDERMAL SYSTEM 1 MG/1
1 PATCH, EXTENDED RELEASE TRANSDERMAL
Status: DISCONTINUED | OUTPATIENT
Start: 2024-01-01 | End: 2024-01-01 | Stop reason: HOSPADM

## 2024-01-01 RX ORDER — SODIUM CHLORIDE 9 MG/ML
INJECTION, SOLUTION INTRAVENOUS
Status: DISCONTINUED | OUTPATIENT
Start: 2024-01-01 | End: 2024-01-01 | Stop reason: HOSPADM

## 2024-01-01 RX ORDER — MAGNESIUM SULFATE HEPTAHYDRATE 40 MG/ML
0.5 INJECTION, SOLUTION INTRAVENOUS CONTINUOUS
Status: DISCONTINUED | OUTPATIENT
Start: 2024-01-01 | End: 2024-01-01

## 2024-01-01 RX ORDER — NOREPINEPHRINE BITARTRATE/D5W 4MG/250ML
0-.2 PLASTIC BAG, INJECTION (ML) INTRAVENOUS CONTINUOUS
Status: DISCONTINUED | OUTPATIENT
Start: 2024-01-01 | End: 2024-01-01

## 2024-01-01 RX ORDER — NOREPINEPHRINE BITARTRATE/D5W 4MG/250ML
PLASTIC BAG, INJECTION (ML) INTRAVENOUS
Status: COMPLETED
Start: 2024-01-01 | End: 2024-01-01

## 2024-01-01 RX ORDER — CHLORHEXIDINE GLUCONATE ORAL RINSE 1.2 MG/ML
15 SOLUTION DENTAL 2 TIMES DAILY
Status: DISCONTINUED | OUTPATIENT
Start: 2024-01-01 | End: 2024-01-01

## 2024-01-01 RX ORDER — SODIUM CHLORIDE 9 MG/ML
INJECTION, SOLUTION INTRAVENOUS
Status: COMPLETED
Start: 2024-01-01 | End: 2024-01-01

## 2024-01-01 RX ADMIN — INSULIN ASPART 2 UNITS: 100 INJECTION, SOLUTION INTRAVENOUS; SUBCUTANEOUS at 11:10

## 2024-01-01 RX ADMIN — INSULIN HUMAN 1 UNITS/HR: 1 INJECTION, SOLUTION INTRAVENOUS at 01:09

## 2024-01-01 RX ADMIN — NOREPINEPHRINE BITARTRATE 0.5 MCG/KG/MIN: 4 INJECTION, SOLUTION INTRAVENOUS at 12:09

## 2024-01-01 RX ADMIN — PANTOPRAZOLE SODIUM 40 MG: 40 INJECTION, POWDER, LYOPHILIZED, FOR SOLUTION INTRAVENOUS at 09:10

## 2024-01-01 RX ADMIN — CHLORHEXIDINE GLUCONATE 15 ML: 1.2 RINSE ORAL at 08:09

## 2024-01-01 RX ADMIN — PIPERACILLIN AND TAZOBACTAM 4.5 G: 4; .5 INJECTION, POWDER, FOR SOLUTION INTRAVENOUS; PARENTERAL at 03:09

## 2024-01-01 RX ADMIN — MUPIROCIN: 20 OINTMENT TOPICAL at 10:09

## 2024-01-01 RX ADMIN — PROPOFOL 25 MCG/KG/MIN: 10 INJECTION, EMULSION INTRAVENOUS at 09:09

## 2024-01-01 RX ADMIN — SODIUM CHLORIDE: 9 INJECTION, SOLUTION INTRAVENOUS at 11:09

## 2024-01-01 RX ADMIN — IOPAMIDOL 100 ML: 755 INJECTION, SOLUTION INTRAVENOUS at 12:09

## 2024-01-01 RX ADMIN — VANCOMYCIN HYDROCHLORIDE 1500 MG: 500 INJECTION, POWDER, LYOPHILIZED, FOR SOLUTION INTRAVENOUS at 07:10

## 2024-01-01 RX ADMIN — PIPERACILLIN AND TAZOBACTAM 4.5 G: 4; .5 INJECTION, POWDER, FOR SOLUTION INTRAVENOUS; PARENTERAL at 03:10

## 2024-01-01 RX ADMIN — ENOXAPARIN SODIUM 40 MG: 40 INJECTION SUBCUTANEOUS at 04:10

## 2024-01-01 RX ADMIN — MORPHINE SULFATE 4 MG: 4 INJECTION, SOLUTION INTRAMUSCULAR; INTRAVENOUS at 09:10

## 2024-01-01 RX ADMIN — INSULIN ASPART 2 UNITS: 100 INJECTION, SOLUTION INTRAVENOUS; SUBCUTANEOUS at 05:10

## 2024-01-01 RX ADMIN — CHLORHEXIDINE GLUCONATE 15 ML: 1.2 RINSE ORAL at 08:10

## 2024-01-01 RX ADMIN — PIPERACILLIN AND TAZOBACTAM 4.5 G: 4; .5 INJECTION, POWDER, FOR SOLUTION INTRAVENOUS; PARENTERAL at 06:09

## 2024-01-01 RX ADMIN — SODIUM CHLORIDE 8 MG/HR: 900 INJECTION INTRAVENOUS at 03:09

## 2024-01-01 RX ADMIN — VANCOMYCIN HYDROCHLORIDE 1250 MG: 1 INJECTION, POWDER, LYOPHILIZED, FOR SOLUTION INTRAVENOUS at 05:09

## 2024-01-01 RX ADMIN — SODIUM CHLORIDE 8 MG/HR: 900 INJECTION INTRAVENOUS at 10:09

## 2024-01-01 RX ADMIN — CHLORHEXIDINE GLUCONATE 15 ML: 1.2 RINSE ORAL at 10:10

## 2024-01-01 RX ADMIN — PANTOPRAZOLE SODIUM 40 MG: 40 INJECTION, POWDER, LYOPHILIZED, FOR SOLUTION INTRAVENOUS at 08:10

## 2024-01-01 RX ADMIN — MUPIROCIN: 20 OINTMENT TOPICAL at 08:10

## 2024-01-01 RX ADMIN — ENOXAPARIN SODIUM 40 MG: 40 INJECTION SUBCUTANEOUS at 05:10

## 2024-01-01 RX ADMIN — MORPHINE SULFATE 8 MG: 4 INJECTION, SOLUTION INTRAMUSCULAR; INTRAVENOUS at 10:10

## 2024-01-01 RX ADMIN — MUPIROCIN: 20 OINTMENT TOPICAL at 10:10

## 2024-01-01 RX ADMIN — MUPIROCIN: 20 OINTMENT TOPICAL at 08:09

## 2024-01-01 RX ADMIN — INSULIN ASPART 4 UNITS: 100 INJECTION, SOLUTION INTRAVENOUS; SUBCUTANEOUS at 06:10

## 2024-01-01 RX ADMIN — SODIUM CHLORIDE 8 MG/HR: 900 INJECTION INTRAVENOUS at 06:09

## 2024-01-01 RX ADMIN — INSULIN ASPART 2 UNITS: 100 INJECTION, SOLUTION INTRAVENOUS; SUBCUTANEOUS at 06:10

## 2024-01-01 RX ADMIN — LORAZEPAM 2 MG: 2 INJECTION, SOLUTION INTRAMUSCULAR; INTRAVENOUS at 12:10

## 2024-01-01 RX ADMIN — CHLORHEXIDINE GLUCONATE 15 ML: 1.2 RINSE ORAL at 09:10

## 2024-01-01 RX ADMIN — SODIUM CHLORIDE 8 MG/HR: 900 INJECTION INTRAVENOUS at 03:10

## 2024-01-01 RX ADMIN — CEFTRIAXONE SODIUM 1 G: 1 INJECTION, POWDER, FOR SOLUTION INTRAMUSCULAR; INTRAVENOUS at 11:10

## 2024-01-01 RX ADMIN — INSULIN ASPART 4 UNITS: 100 INJECTION, SOLUTION INTRAVENOUS; SUBCUTANEOUS at 02:10

## 2024-01-01 RX ADMIN — PIPERACILLIN AND TAZOBACTAM 4.5 G: 4; .5 INJECTION, POWDER, FOR SOLUTION INTRAVENOUS; PARENTERAL at 07:09

## 2024-01-01 RX ADMIN — VANCOMYCIN HYDROCHLORIDE 1500 MG: 500 INJECTION, POWDER, LYOPHILIZED, FOR SOLUTION INTRAVENOUS at 08:10

## 2024-01-01 RX ADMIN — PIPERACILLIN AND TAZOBACTAM 4.5 G: 4; .5 INJECTION, POWDER, FOR SOLUTION INTRAVENOUS; PARENTERAL at 11:09

## 2024-01-01 RX ADMIN — ATROPINE SULFATE 1 MG: 0.1 INJECTION INTRAVENOUS at 04:09

## 2024-01-01 RX ADMIN — FENTANYL CITRATE 25 MCG: 50 INJECTION INTRAMUSCULAR; INTRAVENOUS at 09:10

## 2024-01-01 RX ADMIN — PROPOFOL 5 MCG/KG/MIN: 10 INJECTION, EMULSION INTRAVENOUS at 08:09

## 2024-01-01 RX ADMIN — SODIUM CHLORIDE: 9 INJECTION, SOLUTION INTRAVENOUS at 05:09

## 2024-01-01 RX ADMIN — INSULIN GLARGINE 10 UNITS: 100 INJECTION, SOLUTION SUBCUTANEOUS at 08:10

## 2024-01-01 RX ADMIN — EPINEPHRINE 1 MG: 0.1 INJECTION, SOLUTION ENDOTRACHEAL; INTRACARDIAC; INTRAVENOUS at 06:09

## 2024-01-01 RX ADMIN — LORAZEPAM 2 MG: 2 INJECTION, SOLUTION INTRAMUSCULAR; INTRAVENOUS at 09:10

## 2024-01-01 RX ADMIN — SODIUM CHLORIDE 1000 ML: 9 INJECTION, SOLUTION INTRAVENOUS at 07:09

## 2024-01-01 RX ADMIN — NOREPINEPHRINE BITARTRATE 0.9 MCG/KG/MIN: 4 INJECTION, SOLUTION INTRAVENOUS at 09:09

## 2024-01-01 RX ADMIN — PROPOFOL 15 MCG/KG/MIN: 10 INJECTION, EMULSION INTRAVENOUS at 01:10

## 2024-01-01 RX ADMIN — NOREPINEPHRINE BITARTRATE 0.1 MCG/KG/MIN: 4 INJECTION, SOLUTION INTRAVENOUS at 07:09

## 2024-01-01 RX ADMIN — PANTOPRAZOLE SODIUM 80 MG: 40 INJECTION, POWDER, LYOPHILIZED, FOR SOLUTION INTRAVENOUS at 11:09

## 2024-01-01 RX ADMIN — ALBUMIN (HUMAN) 25 G: 5 SOLUTION INTRAVENOUS at 11:09

## 2024-01-01 RX ADMIN — POTASSIUM BICARBONATE 40 MEQ: 782 TABLET, EFFERVESCENT ORAL at 06:10

## 2024-01-01 RX ADMIN — SODIUM CHLORIDE 8 MG/HR: 900 INJECTION INTRAVENOUS at 11:09

## 2024-01-01 RX ADMIN — POTASSIUM CHLORIDE 10 MEQ: 7.46 INJECTION, SOLUTION INTRAVENOUS at 06:10

## 2024-01-01 RX ADMIN — MAGNESIUM SULFATE HEPTAHYDRATE 0.5 G/HR: 40 INJECTION, SOLUTION INTRAVENOUS at 10:09

## 2024-01-01 RX ADMIN — POTASSIUM BICARBONATE 40 MEQ: 782 TABLET, EFFERVESCENT ORAL at 11:10

## 2024-01-01 RX ADMIN — EPINEPHRINE 10 MCG: 0.1 INJECTION, SOLUTION ENDOTRACHEAL; INTRACARDIAC; INTRAVENOUS at 07:09

## 2024-01-01 RX ADMIN — SODIUM CHLORIDE 8 MG/HR: 900 INJECTION INTRAVENOUS at 07:09

## 2024-01-01 RX ADMIN — NOREPINEPHRINE BITARTRATE 0.16 MCG/KG/MIN: 4 INJECTION, SOLUTION INTRAVENOUS at 01:09

## 2024-01-01 RX ADMIN — NOREPINEPHRINE BITARTRATE 0.8 MCG/KG/MIN: 4 INJECTION, SOLUTION INTRAVENOUS at 10:09

## 2024-01-01 RX ADMIN — BUSPIRONE HYDROCHLORIDE 10 MG: 5 TABLET ORAL at 08:09

## 2024-01-01 RX ADMIN — ACETAMINOPHEN 650 MG: 325 TABLET ORAL at 04:10

## 2024-01-01 RX ADMIN — CEFTRIAXONE SODIUM 1 G: 1 INJECTION, POWDER, FOR SOLUTION INTRAMUSCULAR; INTRAVENOUS at 06:10

## 2024-01-01 RX ADMIN — SODIUM CHLORIDE: 9 INJECTION, SOLUTION INTRAVENOUS at 10:10

## 2024-01-01 RX ADMIN — NOREPINEPHRINE BITARTRATE 0.2 MCG/KG/MIN: 1 INJECTION, SOLUTION, CONCENTRATE INTRAVENOUS at 08:09

## 2024-01-01 RX ADMIN — SODIUM CHLORIDE 8 MG/HR: 900 INJECTION INTRAVENOUS at 04:09

## 2024-01-01 RX ADMIN — INSULIN ASPART 1 UNITS: 100 INJECTION, SOLUTION INTRAVENOUS; SUBCUTANEOUS at 12:10

## 2024-01-01 RX ADMIN — SODIUM CHLORIDE 8 MG/HR: 900 INJECTION INTRAVENOUS at 01:09

## 2024-01-01 RX ADMIN — MAGNESIUM SULFATE HEPTAHYDRATE 2 G: 40 INJECTION, SOLUTION INTRAVENOUS at 08:09

## 2024-01-01 RX ADMIN — CEFTRIAXONE SODIUM 1 G: 1 INJECTION, POWDER, FOR SOLUTION INTRAMUSCULAR; INTRAVENOUS at 10:10

## 2024-01-01 RX ADMIN — AMIODARONE HYDROCHLORIDE 150 MG: 50 INJECTION, SOLUTION INTRAVENOUS at 06:09

## 2024-01-01 RX ADMIN — NOREPINEPHRINE BITARTRATE 0.16 MCG/KG/MIN: 4 INJECTION, SOLUTION INTRAVENOUS at 06:09

## 2024-01-01 RX ADMIN — SODIUM CHLORIDE: 9 INJECTION, SOLUTION INTRAVENOUS at 06:10

## 2024-01-01 RX ADMIN — INSULIN GLARGINE 10 UNITS: 100 INJECTION, SOLUTION SUBCUTANEOUS at 09:10

## 2024-01-01 RX ADMIN — MORPHINE SULFATE 8 MG: 4 INJECTION, SOLUTION INTRAMUSCULAR; INTRAVENOUS at 12:10

## 2024-01-01 RX ADMIN — PROPOFOL 20 MCG/KG/MIN: 10 INJECTION, EMULSION INTRAVENOUS at 07:09

## 2024-01-01 RX ADMIN — CEFTRIAXONE SODIUM 1 G: 1 INJECTION, POWDER, FOR SOLUTION INTRAMUSCULAR; INTRAVENOUS at 08:10

## 2024-01-01 RX ADMIN — PANTOPRAZOLE SODIUM 40 MG: 40 INJECTION, POWDER, LYOPHILIZED, FOR SOLUTION INTRAVENOUS at 10:10

## 2024-01-01 RX ADMIN — FENTANYL CITRATE 25 MCG/HR: 50 INJECTION, SOLUTION INTRAMUSCULAR; INTRAVENOUS at 01:09

## 2024-01-01 RX ADMIN — VANCOMYCIN HYDROCHLORIDE 1500 MG: 500 INJECTION, POWDER, LYOPHILIZED, FOR SOLUTION INTRAVENOUS at 09:10

## 2024-01-01 RX ADMIN — SODIUM BICARBONATE 100 MEQ: 84 INJECTION INTRAVENOUS at 06:09

## 2024-01-01 RX ADMIN — NOREPINEPHRINE BITARTRATE 0.16 MCG/KG/MIN: 4 INJECTION, SOLUTION INTRAVENOUS at 08:09

## 2024-01-01 RX ADMIN — MUPIROCIN: 20 OINTMENT TOPICAL at 09:10

## 2024-01-01 RX ADMIN — LORAZEPAM 2 MG: 2 INJECTION, SOLUTION INTRAMUSCULAR; INTRAVENOUS at 02:10

## 2024-01-01 RX ADMIN — SODIUM CHLORIDE, SODIUM GLUCONATE, SODIUM ACETATE, POTASSIUM CHLORIDE AND MAGNESIUM CHLORIDE: 526; 502; 368; 37; 30 INJECTION, SOLUTION INTRAVENOUS at 07:09

## 2024-01-11 ENCOUNTER — OFFICE VISIT (OUTPATIENT)
Dept: FAMILY MEDICINE | Facility: CLINIC | Age: 89
End: 2024-01-11
Payer: MEDICARE

## 2024-01-11 VITALS
HEIGHT: 62 IN | SYSTOLIC BLOOD PRESSURE: 173 MMHG | DIASTOLIC BLOOD PRESSURE: 78 MMHG | BODY MASS INDEX: 26.68 KG/M2 | TEMPERATURE: 98 F | OXYGEN SATURATION: 95 % | RESPIRATION RATE: 20 BRPM | HEART RATE: 108 BPM | WEIGHT: 145 LBS

## 2024-01-11 DIAGNOSIS — R50.9 FEVER, UNSPECIFIED FEVER CAUSE: ICD-10-CM

## 2024-01-11 DIAGNOSIS — J10.1 INFLUENZA A: ICD-10-CM

## 2024-01-11 DIAGNOSIS — R05.9 COUGH, UNSPECIFIED TYPE: Primary | ICD-10-CM

## 2024-01-11 DIAGNOSIS — J02.9 SORE THROAT: ICD-10-CM

## 2024-01-11 PROBLEM — K92.2 ACUTE GI BLEEDING: Status: RESOLVED | Noted: 2023-10-14 | Resolved: 2024-01-11

## 2024-01-11 LAB
CTP QC/QA: YES
FLUAV AG NPH QL: POSITIVE
FLUBV AG NPH QL: NEGATIVE
S PYO RRNA THROAT QL PROBE: NEGATIVE
SARS-COV-2 RDRP RESP QL NAA+PROBE: NEGATIVE

## 2024-01-11 PROCEDURE — 87804 INFLUENZA ASSAY W/OPTIC: CPT | Mod: 59,RHCUB | Performed by: NURSE PRACTITIONER

## 2024-01-11 PROCEDURE — 87880 STREP A ASSAY W/OPTIC: CPT | Mod: RHCUB | Performed by: NURSE PRACTITIONER

## 2024-01-11 PROCEDURE — 87635 SARS-COV-2 COVID-19 AMP PRB: CPT | Mod: RHCUB | Performed by: NURSE PRACTITIONER

## 2024-01-11 PROCEDURE — 99214 OFFICE O/P EST MOD 30 MIN: CPT | Mod: ,,, | Performed by: NURSE PRACTITIONER

## 2024-01-11 RX ORDER — OSELTAMIVIR PHOSPHATE 75 MG/1
75 CAPSULE ORAL 2 TIMES DAILY
Qty: 10 CAPSULE | Refills: 0 | Status: SHIPPED | OUTPATIENT
Start: 2024-01-11 | End: 2024-02-05

## 2024-01-11 RX ORDER — AZITHROMYCIN 250 MG/1
TABLET, FILM COATED ORAL
Qty: 6 TABLET | Refills: 0 | Status: SHIPPED | OUTPATIENT
Start: 2024-01-11 | End: 2024-02-05

## 2024-01-11 NOTE — PROGRESS NOTES
SUMAN Ayala        PATIENT NAME: Purnima Cardona  : 1934  DATE: 24  MRN: [id number]      Patient PCP Information       Provider PCP Type    Candida SlaughterSUMAN General            Reason for Visit / Chief Complaint: Cough and Fever (Patient presents to clinic for cough and fever. RM 4)           History of Present Illness / Problem Focused Workflow     Purnima Cardona presents to the clinic with Cough and Fever (Patient presents to clinic for cough and fever. RM 4)     HPI  Ms Cardona comes to clinic from [facility] for evaluation due to cough, chest congestion and fever.   Patient BP elevated on arrival. Patient accompanied by sitter, spoke with daughter over phone.   Patient has been out of amlodipine. Daughter refilled prescription and bringing it to her.     Review of Systems     Review of Systems   Constitutional:  Positive for fever. Negative for activity change, appetite change, chills, diaphoresis, fatigue and unexpected weight change.   HENT:  Positive for congestion. Negative for ear pain, facial swelling, hearing loss, nosebleeds and sore throat.    Eyes: Negative.    Respiratory:  Positive for cough. Negative for apnea, shortness of breath and wheezing.    Cardiovascular:  Negative for chest pain, palpitations and leg swelling.   Gastrointestinal:  Negative for abdominal distention, abdominal pain, blood in stool, constipation, diarrhea and nausea.   Endocrine: Negative for cold intolerance, heat intolerance, polydipsia, polyphagia and polyuria.   Genitourinary:  Negative for decreased urine volume, difficulty urinating, dysuria, flank pain, frequency, hematuria and urgency.   Musculoskeletal:  Negative for arthralgias, joint swelling and myalgias.   Skin:  Negative for color change and rash.   Allergic/Immunologic: Negative.    Neurological:  Negative for dizziness, tremors, seizures, syncope, facial asymmetry, speech difficulty, weakness, light-headedness, numbness and headaches.    Hematological:  Negative for adenopathy. Does not bruise/bleed easily.   Psychiatric/Behavioral:  Negative for behavioral problems and confusion.        Medical / Social / Family History     Past Medical History:   Diagnosis Date    Abnormal EKG     Essential hypertension     Hyperlipidemia     Shortness of breath        Past Surgical History:   Procedure Laterality Date    CHOLECYSTECTOMY      EYE SURGERY      cataract removal    HYSTERECTOMY         Social History  Ms.  reports that she has never smoked. She has never been exposed to tobacco smoke. She has never used smokeless tobacco. She reports that she does not drink alcohol and does not use drugs.    Family History  Ms.'s family history includes Breast cancer in her mother; Stroke in her father.    Medications and Allergies     Medications  Outpatient Medications Marked as Taking for the 1/11/24 encounter (Office Visit) with Candida Slaughter FNP   Medication Sig Dispense Refill    acetaminophen (TYLENOL EXTRA STRENGTH ORAL) Take by mouth.      amLODIPine (NORVASC) 2.5 MG tablet Take 1 tablet (2.5 mg total) by mouth once daily. 30 tablet 5    calcium carbonate (OS-NASREEN) 600 mg calcium (1,500 mg) Tab Take 600 mg by mouth once daily. Take 1 tablet po BID      furosemide (LASIX) 20 MG tablet Take 1 tablet (20 mg total) by mouth once daily. 90 tablet 3    lisinopriL (PRINIVIL,ZESTRIL) 40 MG tablet Take 1 tablet (40 mg total) by mouth once daily. 90 tablet 3    multivit-min/ferrous fumarate (MULTI VITAMIN ORAL) Take by mouth.      pantoprazole (PROTONIX) 40 MG tablet Take 1 tablet (40 mg total) by mouth once daily. 90 tablet 4    potassium chloride (KLOR-CON) 8 MEQ TbSR Take 1 tablet (8 mEq total) by mouth once daily. for 90 doses 90 tablet 0       Allergies  Review of patient's allergies indicates:   Allergen Reactions    Clonidine     Hiprex [methenamine hippurate]     Sulfa (sulfonamide antibiotics)        Physical Examination     Vitals:    01/11/24 1606  "01/11/24 1610   BP: (!) 180/78 (!) 173/78   BP Location: Left arm    Patient Position: Sitting    BP Method: Medium (Automatic)    Pulse: 108    Resp: 20    Temp: 97.8 °F (36.6 °C)    TempSrc: Oral    SpO2: 95%    Weight: 65.8 kg (145 lb)    Height: 5' 2" (1.575 m)        Physical Exam  Vitals reviewed.   Constitutional:       Appearance: Normal appearance.   HENT:      Head: Normocephalic.      Right Ear: External ear normal.      Left Ear: External ear normal.      Nose: Congestion and rhinorrhea present.      Mouth/Throat:      Mouth: Mucous membranes are moist.   Eyes:      Extraocular Movements: Extraocular movements intact.   Cardiovascular:      Rate and Rhythm: Normal rate and regular rhythm.      Pulses: Normal pulses.      Heart sounds: Normal heart sounds.   Pulmonary:      Effort: Pulmonary effort is normal.      Breath sounds: Normal breath sounds.      Comments: Clear BBS  Wet cough  Abdominal:      Palpations: Abdomen is soft.   Musculoskeletal:         General: Normal range of motion.      Cervical back: Normal range of motion.   Skin:     General: Skin is warm and dry.      Capillary Refill: Capillary refill takes less than 2 seconds.   Neurological:      General: No focal deficit present.      Mental Status: She is alert and oriented to person, place, and time.   Psychiatric:         Mood and Affect: Mood normal.         Behavior: Behavior normal.         Thought Content: Thought content normal.         Judgment: Judgment normal.           Office Visit on 01/11/2024   Component Date Value Ref Range Status    POC Rapid COVID 01/11/2024 Negative  Negative Final     Acceptable 01/11/2024 Yes   Final    Rapid Influenza A Ag 01/11/2024 Positive (A)  Negative Final    Rapid Influenza B Ag 01/11/2024 Negative  Negative Final     Acceptable 01/11/2024 Yes   Final    Rapid Strep A Screen 01/11/2024 Negative  Negative, Positive Slide, Positive Tube Final     " Acceptable 01/11/2024 Yes   Final             Assessment and Plan (including Health Maintenance)      Problem List  Smart Sets  Document Outside HM   :    Plan:   Cough, unspecified type  -     POCT COVID-19 Rapid Screening  -     POCT Influenza A/B Rapid Antigen    Fever, unspecified fever cause  -     POCT COVID-19 Rapid Screening    Sore throat  -     POCT rapid strep A    Influenza A  -     oseltamivir (TAMIFLU) 75 MG capsule; Take 1 capsule (75 mg total) by mouth 2 (two) times daily. for 5 days  Dispense: 10 capsule; Refill: 0  -     azithromycin (Z-DEANNA) 250 MG tablet; Take 2 tablets by mouth on day 1; Take 1 tablet by mouth on days 2-5  Dispense: 6 tablet; Refill: 0     Patient tested positive for influenza  Take tamiflu as directed  Tylenol/Motrin as needed as directed for fever/headaches/bodyaches   OTC cough medicine as needed for cough  RTC if symptoms persist or fail to improve  Adequate oral hydration     There are no Patient Instructions on file for this visit.       Health Maintenance Due   Topic Date Due    COVID-19 Vaccine (1) Never done    TETANUS VACCINE  Never done    Shingles Vaccine (1 of 2) Never done    RSV Vaccine (Age 60+ and Pregnant patients) (1 - 1-dose 60+ series) Never done    Pneumococcal Vaccines (Age 65+) (1 - PCV) Never done    Influenza Vaccine (1) 09/01/2023       Most Recent Immunizations   Administered Date(s) Administered    PPD Test 07/20/2022        Problem List Items Addressed This Visit    None  Visit Diagnoses       Cough, unspecified type    -  Primary    Relevant Orders    POCT COVID-19 Rapid Screening (Completed)    POCT Influenza A/B Rapid Antigen (Completed)    Fever, unspecified fever cause        Relevant Orders    POCT COVID-19 Rapid Screening (Completed)    Sore throat        Relevant Orders    POCT rapid strep A (Completed)    Influenza A        Relevant Medications    oseltamivir (TAMIFLU) 75 MG capsule    azithromycin (Z-DEANNA) 250 MG tablet            Health  Maintenance Topics with due status: Not Due       Topic Last Completion Date    Lipid Panel 05/22/2020       Future Appointments   Date Time Provider Department Center   1/17/2024  1:45 PM Jose Dowell DO [facility]   2/5/2024  2:45 PM Candida Slaughter FNP [facility] [place]   4/17/2024  2:40 PM Jose Purcell MD [facility] ENT [facility]            Signature:  SUMAN Ayala  [facility]     Date of encounter: 1/11/24

## 2024-01-12 RX ORDER — BENZONATATE 100 MG/1
100 CAPSULE ORAL 3 TIMES DAILY PRN
Qty: 30 CAPSULE | Refills: 0 | Status: SHIPPED | OUTPATIENT
Start: 2024-01-12 | End: 2024-02-05

## 2024-01-30 RX ORDER — FUROSEMIDE 20 MG/1
20 TABLET ORAL DAILY
Qty: 90 TABLET | Refills: 1 | Status: SHIPPED | OUTPATIENT
Start: 2024-01-30 | End: 2024-01-31 | Stop reason: SDUPTHER

## 2024-02-02 RX ORDER — FUROSEMIDE 20 MG/1
20 TABLET ORAL DAILY
Qty: 90 TABLET | Refills: 3 | Status: SHIPPED | OUTPATIENT
Start: 2024-02-02

## 2024-02-05 ENCOUNTER — OFFICE VISIT (OUTPATIENT)
Dept: FAMILY MEDICINE | Facility: CLINIC | Age: 89
End: 2024-02-05
Payer: MEDICARE

## 2024-02-05 VITALS
DIASTOLIC BLOOD PRESSURE: 55 MMHG | BODY MASS INDEX: 26.87 KG/M2 | RESPIRATION RATE: 20 BRPM | OXYGEN SATURATION: 96 % | WEIGHT: 146 LBS | TEMPERATURE: 98 F | HEIGHT: 62 IN | HEART RATE: 95 BPM | SYSTOLIC BLOOD PRESSURE: 147 MMHG

## 2024-02-05 DIAGNOSIS — I10 HYPERTENSION, UNSPECIFIED TYPE: Primary | Chronic | ICD-10-CM

## 2024-02-05 PROCEDURE — 99213 OFFICE O/P EST LOW 20 MIN: CPT | Mod: ,,, | Performed by: NURSE PRACTITIONER

## 2024-02-05 RX ORDER — POTASSIUM CHLORIDE 600 MG/1
8 TABLET, FILM COATED, EXTENDED RELEASE ORAL DAILY
Qty: 90 TABLET | Refills: 3 | Status: SHIPPED | OUTPATIENT
Start: 2024-02-05 | End: 2025-01-30

## 2024-02-05 RX ORDER — NIFEDIPINE 30 MG/1
30 TABLET, EXTENDED RELEASE ORAL DAILY
Qty: 30 TABLET | Refills: 11 | Status: SHIPPED | OUTPATIENT
Start: 2024-02-05 | End: 2024-02-12 | Stop reason: ALTCHOICE

## 2024-02-05 NOTE — PROGRESS NOTES
SUMAN Ayala        PATIENT NAME: Purnima Cardona  : 1934  DATE: 24  MRN: [id number]      Patient PCP Information       Provider PCP Type    Candida MORELOS SUMAN Slaughter [facility]            Reason for Visit / Chief Complaint: Follow-up (Patient presents to the clinic for a 3m f/u ) and Hypertension      History of Present Illness / Problem Focused Workflow     Purnima Cardona presents to the clinic with Follow-up (Patient presents to the clinic for a 3m f/u ) and Hypertension     HPI  Ms Cardona is here for follow up, bp remains elevated today.   Patient continues to have mod edema bilateral ankles.   Patient currently taking lasix 20 mg daily  Patient also on lisinopril and amlodipine.   Patient ambulatory with walker.     Dr Dowell Cardiologist   2023  Vent. Rate : 091 BPM     Atrial Rate : 091 BPM      P-R Int : 196 ms          QRS Dur : 084 ms       QT Int : 350 ms       P-R-T Axes : 084 -42 083 degrees      QTc Int : 430 ms     Normal sinus rhythm   Left axis deviation   Septal infarct (cited on or before 2022)   Abnormal ECG   When compared with ECG of 2022 15:01,   Premature supraventricular complexes are no longer Present   The axis Shifted left   Confirmed by Jose Dowell DO ([id number]) on 2023 11:19:40 PM       Review of Systems     Review of Systems   Constitutional:  Negative for activity change, appetite change, chills, diaphoresis, fatigue, fever and unexpected weight change.   HENT:  Negative for congestion, ear pain, facial swelling, hearing loss, nosebleeds and sore throat.    Eyes: Negative.    Respiratory:  Negative for apnea, cough, shortness of breath and wheezing.    Cardiovascular:  Positive for leg swelling. Negative for chest pain and palpitations.   Gastrointestinal:  Negative for abdominal distention, abdominal pain, blood in stool, constipation, diarrhea and nausea.   Endocrine: Negative for cold intolerance, heat intolerance, polydipsia, polyphagia and  polyuria.   Genitourinary:  Negative for decreased urine volume, difficulty urinating, dysuria, flank pain, frequency, hematuria and urgency.   Musculoskeletal:  Negative for arthralgias, joint swelling and myalgias.   Skin:  Negative for color change and rash.   Allergic/Immunologic: Negative.    Neurological:  Negative for dizziness, tremors, seizures, syncope, facial asymmetry, speech difficulty, weakness, light-headedness, numbness and headaches.   Hematological:  Negative for adenopathy. Does not bruise/bleed easily.   Psychiatric/Behavioral:  Negative for behavioral problems and confusion.        Medical / Social / Family History     Past Medical History:   Diagnosis Date    Abnormal EKG     Essential hypertension     Hyperlipidemia     Shortness of breath        Past Surgical History:   Procedure Laterality Date    CHOLECYSTECTOMY      EYE SURGERY      cataract removal    HYSTERECTOMY         Social History  Ms.  reports that she has never smoked. She has never been exposed to tobacco smoke. She has never used smokeless tobacco. She reports that she does not drink alcohol and does not use drugs.    Family History  Ms.'s family history includes Breast cancer in her mother; Stroke in her father.    Medications and Allergies     Medications  Outpatient Medications Marked as Taking for the 2/5/24 encounter (Office Visit) with Candida Slaughter FNP   Medication Sig Dispense Refill    calcium carbonate (OS-NASREEN) 600 mg calcium (1,500 mg) Tab Take 600 mg by mouth once daily. Take 1 tablet po BID      furosemide (LASIX) 20 MG tablet Take 1 tablet (20 mg total) by mouth once daily. 90 tablet 3    lisinopriL (PRINIVIL,ZESTRIL) 40 MG tablet Take 1 tablet (40 mg total) by mouth once daily. 90 tablet 3    multivit-min/ferrous fumarate (MULTI VITAMIN ORAL) Take by mouth.      pantoprazole (PROTONIX) 40 MG tablet Take 1 tablet (40 mg total) by mouth once daily. 90 tablet 4    [DISCONTINUED] amLODIPine (NORVASC) 2.5 MG  "tablet Take 1 tablet (2.5 mg total) by mouth once daily. 30 tablet 5       Allergies  Review of patient's allergies indicates:   Allergen Reactions    Clonidine     Hiprex [methenamine hippurate]     Sulfa (sulfonamide antibiotics)        Physical Examination     Vitals:    02/05/24 1518 02/05/24 1521   BP: (!) 166/80 (!) 147/55   BP Location: Right arm Right arm   Patient Position: Sitting Sitting   BP Method: Large (Automatic) Large (Automatic)   Pulse: 95    Resp: 20    Temp: 97.5 °F (36.4 °C)    TempSrc: Oral    SpO2: 96%    Weight: 66.2 kg (146 lb)    Height: 5' 2" (1.575 m)        Physical Exam  Vitals and nursing note reviewed.   Constitutional:       Appearance: Normal appearance. She is normal weight.   HENT:      Head: Normocephalic.      Right Ear: External ear normal.      Left Ear: External ear normal.      Nose: Nose normal.      Mouth/Throat:      Mouth: Mucous membranes are moist.   Eyes:      Extraocular Movements: Extraocular movements intact.      Conjunctiva/sclera: Conjunctivae normal.      Pupils: Pupils are equal, round, and reactive to light.   Cardiovascular:      Rate and Rhythm: Normal rate and regular rhythm.      Pulses: Normal pulses.      Heart sounds: Normal heart sounds. No murmur heard.  Pulmonary:      Effort: Pulmonary effort is normal.      Breath sounds: Normal breath sounds. No stridor. No wheezing or rhonchi.   Abdominal:      General: Bowel sounds are normal. There is no distension.      Palpations: Abdomen is soft. There is no mass.      Tenderness: There is no abdominal tenderness.   Musculoskeletal:         General: No swelling or tenderness. Normal range of motion.      Cervical back: Normal range of motion and neck supple.      Right lower leg: Edema present.      Left lower leg: Edema present.   Skin:     General: Skin is warm and dry.   Neurological:      General: No focal deficit present.      Mental Status: She is alert. Mental status is at baseline.      Cranial " Nerves: No cranial nerve deficit.      Sensory: No sensory deficit.      Motor: No weakness.   Psychiatric:         Mood and Affect: Mood normal.         Behavior: Behavior normal.         Thought Content: Thought content normal.         Judgment: Judgment normal.           No visits with results within 14 Day(s) from this visit.   Latest known visit with results is:   Office Visit on 01/11/2024   Component Date Value Ref Range Status    POC Rapid COVID 01/11/2024 Negative  Negative Final     Acceptable 01/11/2024 Yes   Final    Rapid Influenza A Ag 01/11/2024 Positive (A)  Negative Final    Rapid Influenza B Ag 01/11/2024 Negative  Negative Final     Acceptable 01/11/2024 Yes   Final    Rapid Strep A Screen 01/11/2024 Negative  Negative, Positive Slide, Positive Tube Final     Acceptable 01/11/2024 Yes   Final             Assessment and Plan (including Health Maintenance)      Problem List  Smart Sets  Document Outside HM   :    Plan:   Hypertension, unspecified type  Comments:  stop amlodipine start procardia. continue lisinopril and lasix  patient to follow up in 4 weeks for repeat BP check and assess ankle swelling  Orders:  -     potassium chloride (KLOR-CON) 8 MEQ TbSR; Take 1 tablet (8 mEq total) by mouth once daily. for 360 doses  Dispense: 90 tablet; Refill: 3  -     NIFEdipine (PROCARDIA-XL) 30 MG (OSM) 24 hr tablet; Take 1 tablet (30 mg total) by mouth once daily.  Dispense: 30 tablet; Refill: 11     RTC in 4 weeks for repeat bp eval  There are no Patient Instructions on file for this visit.       Health Maintenance Due   Topic Date Due    COVID-19 Vaccine (1) Never done    TETANUS VACCINE  Never done    Shingles Vaccine (1 of 2) Never done    RSV Vaccine (Age 60+ and Pregnant patients) (1 - 1-dose 60+ series) Never done    Pneumococcal Vaccines (Age 65+) (1 of 1 - PCV) Never done    Influenza Vaccine (1) 09/01/2023       Most Recent Immunizations    Administered Date(s) Administered    PPD Test 07/20/2022        Problem List Items Addressed This Visit    None  Visit Diagnoses       Hypertension, unspecified type  (Chronic)  (Poorly controlled)  -  Primary    stop amlodipine start procardia. continue lisinopril and lasix  patient to follow up in 4 weeks for repeat BP check and assess ankle swelling    Relevant Medications    potassium chloride (KLOR-CON) 8 MEQ TbSR    NIFEdipine (PROCARDIA-XL) 30 MG (OSM) 24 hr tablet            Health Maintenance Topics with due status: Not Due       Topic Last Completion Date    Lipid Panel 05/22/2020       Future Appointments   Date Time Provider Department Center   2/12/2024  2:45 PM Jose Dowell DO [facility] CARD [facility]   3/4/2024  2:45 PM Candida Slaughter FNP [facility]   3/7/2024  3:00 PM AWV NURSE, [facility]   4/17/2024  2:40 PM Jose Purcell MD [facility] ENT [facility]            Signature:  SUMAN Ayala  [facility]     Date of encounter: 2/5/24

## 2024-02-12 ENCOUNTER — OFFICE VISIT (OUTPATIENT)
Dept: CARDIOLOGY | Facility: CLINIC | Age: 89
End: 2024-02-12
Payer: MEDICARE

## 2024-02-12 VITALS
HEART RATE: 104 BPM | SYSTOLIC BLOOD PRESSURE: 122 MMHG | WEIGHT: 149.19 LBS | BODY MASS INDEX: 27.29 KG/M2 | OXYGEN SATURATION: 98 % | DIASTOLIC BLOOD PRESSURE: 62 MMHG

## 2024-02-12 DIAGNOSIS — I49.1 PREMATURE ATRIAL CONTRACTIONS: ICD-10-CM

## 2024-02-12 DIAGNOSIS — R60.0 LOWER EXTREMITY EDEMA: ICD-10-CM

## 2024-02-12 DIAGNOSIS — I10 ESSENTIAL HYPERTENSION: Primary | ICD-10-CM

## 2024-02-12 LAB
OHS QRS DURATION: 82 MS
OHS QTC CALCULATION: 430 MS

## 2024-02-12 PROCEDURE — 93005 ELECTROCARDIOGRAM TRACING: CPT | Mod: PBBFAC | Performed by: INTERNAL MEDICINE

## 2024-02-12 PROCEDURE — 99213 OFFICE O/P EST LOW 20 MIN: CPT | Mod: PBBFAC,25 | Performed by: INTERNAL MEDICINE

## 2024-02-12 PROCEDURE — 99214 OFFICE O/P EST MOD 30 MIN: CPT | Mod: S$PBB,,, | Performed by: INTERNAL MEDICINE

## 2024-02-12 PROCEDURE — 93010 ELECTROCARDIOGRAM REPORT: CPT | Mod: S$PBB,,, | Performed by: INTERNAL MEDICINE

## 2024-02-12 RX ORDER — ACETAMINOPHEN 500 MG
500 TABLET ORAL EVERY 6 HOURS PRN
COMMUNITY

## 2024-02-12 RX ORDER — CARVEDILOL 3.12 MG/1
3.12 TABLET ORAL 2 TIMES DAILY WITH MEALS
Qty: 60 TABLET | Refills: 11 | Status: SHIPPED | OUTPATIENT
Start: 2024-02-12 | End: 2024-04-19 | Stop reason: DRUGHIGH

## 2024-02-12 RX ORDER — METOLAZONE 5 MG/1
5 TABLET ORAL DAILY
Status: DISCONTINUED | OUTPATIENT
Start: 2024-02-13 | End: 2024-03-14

## 2024-02-12 NOTE — PROGRESS NOTES
Cardiology Clinic Note:    PCP: Candida Slaughter FNP    REFERRING PHYSICIAN: Candida Slaughter FNP    CHIEF COMPLAINT:   Chief Complaint   Patient presents with    Follow-up     6 month    Edema     Bilateral ankle. States pain in it since 2/11/24. Never goes down.     Shortness of Breath     Has always had it with exertion        HISTORY OF PRESENT ILLNESS:  Purnima Cardona is a 89 y.o. female who presents for evaluation of hypertension, shortness of breath.      Pt reports has been more active, moved to assisted living, notes much more socially interactive, shortness of breath with activity has resolved. . She reports chest pain has resolved.  She  is monitoring blood pressure has been well controlled.  She notes mild bilateral peripheral has improved, back to one Lasix daily. . Patient states she is walking  with walker, several times a week, has been limited by bad weather, not by symptoms. .    Patient denies dizziness.  Denies chest pain, pressure, tightness or squeezing.      Review of Systems   Constitutional: Negative for diaphoresis, malaise/fatigue, night sweats and weight gain.   HENT:  Negative for congestion, ear pain, hearing loss, nosebleeds and sore throat.    Eyes:  Negative for blurred vision, double vision, pain, photophobia and visual disturbance.   Cardiovascular:  Negative for chest pain, claudication, dyspnea on exertion, irregular heartbeat, leg swelling, near-syncope, orthopnea, palpitations and syncope.   Respiratory:  Negative for cough, shortness of breath, sleep disturbances due to breathing, snoring and wheezing.    Endocrine: Negative for cold intolerance, heat intolerance, polydipsia, polyphagia and polyuria.   Hematologic/Lymphatic: Negative for bleeding problem. Does not bruise/bleed easily.   Skin:  Negative for dry skin, flushing, itching, rash and skin cancer.   Musculoskeletal:  Negative for arthritis, back pain, falls, joint pain, muscle cramps, muscle weakness and  myalgias.   Gastrointestinal:  Negative for abdominal pain, change in bowel habit, constipation, diarrhea, dysphagia, heartburn, nausea and vomiting.   Genitourinary:  Negative for bladder incontinence, dysuria, flank pain, frequency and nocturia.   Neurological:  Negative for dizziness, focal weakness, headaches, light-headedness, loss of balance, numbness, paresthesias and seizures.   Psychiatric/Behavioral:  Negative for depression, memory loss and substance abuse. The patient is not nervous/anxious.    Allergic/Immunologic: Negative for environmental allergies.          PAST MEDICAL HISTORY:  Past Medical History:   Diagnosis Date    Abnormal EKG     Essential hypertension     Hyperlipidemia     Shortness of breath        PAST SURGICAL HISTORY:  Past Surgical History:   Procedure Laterality Date    CHOLECYSTECTOMY      EYE SURGERY      cataract removal    HYSTERECTOMY         SOCIAL HISTORY:  Social History     Socioeconomic History    Marital status:    Tobacco Use    Smoking status: Never     Passive exposure: Never    Smokeless tobacco: Never   Substance and Sexual Activity    Alcohol use: Never    Drug use: Never    Sexual activity: Not Currently     Social Determinants of Health     Financial Resource Strain: Low Risk  (2/12/2024)    Overall Financial Resource Strain (CARDIA)     Difficulty of Paying Living Expenses: Not hard at all   Food Insecurity: No Food Insecurity (2/12/2024)    Hunger Vital Sign     Worried About Running Out of Food in the Last Year: Never true     Ran Out of Food in the Last Year: Never true   Transportation Needs: No Transportation Needs (2/12/2024)    PRAPARE - Transportation     Lack of Transportation (Medical): No     Lack of Transportation (Non-Medical): No   Physical Activity: Insufficiently Active (2/12/2024)    Exercise Vital Sign     Days of Exercise per Week: 2 days     Minutes of Exercise per Session: 20 min   Stress: Stress Concern Present (2/12/2024)    [place]  [place] [employer] - Occupational Stress Questionnaire     Feeling of Stress : To some extent   Social Connections: Moderately Integrated (2/12/2024)    Social Connection and Isolation Panel [NHANES]     Frequency of Communication with Friends and Family: More than three times a week     Frequency of Social Gatherings with Friends and Family: Once a week     Attends [employer] Services: More than 4 times per year     Active Member of Clubs or Organizations: Yes     Attends Club or Organization Meetings: Never     Marital Status:    Housing Stability: Low Risk  (2/12/2024)    Housing Stability Vital Sign     Unable to Pay for Housing in the Last Year: No     Number of Places Lived in the Last Year: 1     Unstable Housing in the Last Year: No       FAMILY HISTORY:  Family History   Problem Relation Age of Onset    Breast cancer Mother     Stroke Father        ALLERGIES:  Allergies as of 02/12/2024 - Reviewed 02/12/2024   Allergen Reaction Noted    Clonidine  03/19/2021    Hiprex [methenamine hippurate]  03/19/2021    Sulfa (sulfonamide antibiotics)  03/19/2021         MEDICATIONS:  Current Outpatient Medications on File Prior to Visit   Medication Sig Dispense Refill    acetaminophen (TYLENOL) 500 MG tablet Take 500 mg by mouth every 6 (six) hours as needed for Pain.      furosemide (LASIX) 20 MG tablet Take 1 tablet (20 mg total) by mouth once daily. 90 tablet 3    lisinopriL (PRINIVIL,ZESTRIL) 40 MG tablet Take 1 tablet (40 mg total) by mouth once daily. 90 tablet 3    multivit-min/ferrous fumarate (MULTI VITAMIN ORAL) Take by mouth.      NIFEdipine (PROCARDIA-XL) 30 MG (OSM) 24 hr tablet Take 1 tablet (30 mg total) by mouth once daily. 30 tablet 11    pantoprazole (PROTONIX) 40 MG tablet Take 1 tablet (40 mg total) by mouth once daily. 90 tablet 4    potassium chloride (KLOR-CON) 8 MEQ TbSR Take 1 tablet (8 mEq total) by mouth once daily. for 360 doses 90 tablet 3    calcium carbonate (OS-NASREEN)  600 mg calcium (1,500 mg) Tab Take 600 mg by mouth once daily. Take 1 tablet po BID       No current facility-administered medications on file prior to visit.          PHYSICAL EXAM:  Blood pressure 122/62, pulse 104, weight 67.7 kg (149 lb 3.2 oz), SpO2 98 %.  Wt Readings from Last 3 Encounters:   02/12/24 67.7 kg (149 lb 3.2 oz)   02/05/24 66.2 kg (146 lb)   01/11/24 65.8 kg (145 lb)      Body mass index is 27.29 kg/m².    Physical Exam  Vitals and nursing note reviewed.   Constitutional:       Appearance: Normal appearance. She is normal weight.   HENT:      Head: Normocephalic and atraumatic.      Right Ear: External ear normal.      Left Ear: External ear normal.   Eyes:      General: No scleral icterus.        Right eye: No discharge.         Left eye: No discharge.      Extraocular Movements: Extraocular movements intact.      Conjunctiva/sclera: Conjunctivae normal.      Pupils: Pupils are equal, round, and reactive to light.   Cardiovascular:      Rate and Rhythm: Normal rate and regular rhythm.      Pulses: Normal pulses.      Heart sounds: Normal heart sounds. No murmur heard.     No friction rub. No gallop.   Pulmonary:      Effort: Pulmonary effort is normal.      Breath sounds: Normal breath sounds. No wheezing, rhonchi or rales.   Chest:      Chest wall: No tenderness.   Abdominal:      General: Abdomen is flat. Bowel sounds are normal. There is no distension.      Palpations: Abdomen is soft.      Tenderness: There is no abdominal tenderness. There is no guarding or rebound.   Musculoskeletal:         General: No swelling or tenderness. Normal range of motion.      Cervical back: Normal range of motion and neck supple.      Right lower leg: Edema present.      Left lower leg: Edema present.   Skin:     General: Skin is warm and dry.      Findings: No erythema or rash.   Neurological:      General: No focal deficit present.      Mental Status: She is alert and oriented to person, place, and time.       "Cranial Nerves: No cranial nerve deficit.      Motor: No weakness.      Gait: Gait normal.   Psychiatric:         Mood and Affect: Mood normal.         Behavior: Behavior normal.         Thought Content: Thought content normal.         Judgment: Judgment normal.        LABS REVIEWED:  Lab Results   Component Value Date    WBC 8.19 10/16/2023    RBC 3.13 (L) 10/16/2023    HGB 8.7 (L) 10/16/2023    HCT 25.8 (L) 10/16/2023    MCV 91.1 10/16/2023    MCH 30.4 10/16/2023    MCHC 33.3 10/16/2023    RDW 14.3 10/16/2023     10/16/2023    MPV 11.2 10/16/2023    NRBC 0.0 10/16/2023    INR 1.09 10/14/2023     Lab Results   Component Value Date     10/16/2023    K 3.6 10/16/2023     10/16/2023    CO2 29 10/16/2023    BUN 13 10/16/2023    MG 2.5 (H) 10/27/2022     Lab Results   Component Value Date    AST 17 10/14/2023    ALT 20 10/14/2023     Lab Results   Component Value Date     (H) 10/16/2023    HGBA1C 6.6 10/27/2022     No results found for: "CHOL", "HDL", "LDL", "TRIG", "CHOLHDL"    CARDIAC STUDIES REVIEWED:    OTHER IMAGING STUDIES REVIEWED:        ASSESSMENT:   Essential hypertension  -     EKG 12-lead; Future    Lower extremity edema    Premature atrial contractions            PLAN:  1.  Hypertension,not clear if has adequate control off amlodipine, was switched to Procardia, plan was to try to improve ankle edema, no change, will DC procardia, start Coreg 3.125 mg   bid  2.  Lower extremity edema, has reoccurred, taking lasix daily, add Zaroxyln 5 mg q d x 3,   3. Orthostatic symptoms have resolved.  4. Shortness of breath with exertion has resolved, encourage ambulation, goal walking 10 mins AM and PM with walker    Follow up in 4 to 6 weeks, sooner if symptoms change        "

## 2024-02-15 ENCOUNTER — DOCUMENT SCAN (OUTPATIENT)
Dept: HOME HEALTH SERVICES | Facility: HOSPITAL | Age: 89
End: 2024-02-15
Payer: MEDICARE

## 2024-02-29 NOTE — PROGRESS NOTES
Purnima Cardona presented for a  Medicare AWV and comprehensive Health Risk Assessment today. The following components were reviewed and updated:    Medical history  Family History  Social history  Allergies and Current Medications  Health Risk Assessment  Health Maintenance  Care Team         ** See Completed Assessments for Annual Wellness Visit within the encounter summary.**         The following assessments were completed:  Living Situation  CAGE  Depression Screening  Timed Get Up and Go  Whisper Test  Cognitive Function Screening  Nutrition Screening  ADL Screening  PAQ Screening      Opioid documentation:      Patient does not have a current opioid prescription.        Vitals:    03/07/24 1500   BP: 130/72   BP Location: Right arm   Patient Position: Sitting   Pulse: 97   Resp: 18   Temp: 98.5 °F (36.9 °C)   TempSrc: Oral   SpO2: 98%   Weight: 61.7 kg (136 lb)   Height: 5' (1.524 m)     Body mass index is 26.56 kg/m².  Physical Exam  Vitals reviewed.   Constitutional:       Appearance: Normal appearance.   HENT:      Head: Normocephalic.      Right Ear: External ear normal.      Left Ear: External ear normal.      Nose: Nose normal.      Mouth/Throat:      Mouth: Mucous membranes are moist.   Eyes:      Extraocular Movements: Extraocular movements intact.   Cardiovascular:      Rate and Rhythm: Normal rate.      Pulses: Normal pulses.      Heart sounds: Normal heart sounds.   Pulmonary:      Effort: Pulmonary effort is normal. No respiratory distress.      Breath sounds: Normal breath sounds. No stridor. No wheezing, rhonchi or rales.   Chest:      Chest wall: No tenderness.   Abdominal:      General: Bowel sounds are normal.   Musculoskeletal:         General: Normal range of motion.      Cervical back: Normal range of motion.      Right lower leg: Edema present.      Left lower leg: Edema present.   Skin:     General: Skin is warm and dry.      Capillary Refill: Capillary refill takes less than 2 seconds.    Neurological:      General: No focal deficit present.      Mental Status: She is alert and oriented to person, place, and time.   Psychiatric:         Mood and Affect: Mood normal.         Behavior: Behavior normal.         Thought Content: Thought content normal.         Judgment: Judgment normal.               Diagnoses and health risks identified today and associated recommendations/orders:    1. Encounter for subsequent annual wellness visit (AWV) in Medicare patient        2. Essential hypertension Well controlled Lipid Panel    Basic Metabolic Panel    Lipid Panel    Basic Metabolic Panel    continue coreg, lisinopril and lasix      3. Diverticular disease      stable      4. Anemia, unspecified type      stable      5. Premature atrial contractions      stable, continue coreg, lisinopril and lasix      6. Abnormality of gait and mobility      fall precautions, ambulatory with walker      7. Other reduced mobility        8. Loss of weight      patient has been walking 10 min daily since last cardiology appt, took short course of zaroxylyn per cardiology recs. weight down 10 llbs stabe      9. BMI 26.0-26.9,adult      healthy diet      10. Hyperglycemia  Hemoglobin A1C    follow up A1C ordered            Provided Purnima with a 5-10 year written screening schedule and personal prevention plan. Recommendations were developed using the USPSTF age appropriate recommendations. Education, counseling, and referrals were provided as needed. After Visit Summary printed and given to patient which includes a list of additional screenings\tests needed.    Follow up in about 1 year (around 3/7/2025).    Candida Slaughter, SUMAN    Covid vaccine - declined, Influenza vaccine - declined, Pneumonia vaccine - declined, RSV vaccine - VIS (10'19/2023) given with instructions to take at pharmacy, Tetanus vaccine- declined, Shingles vaccine - declined, Lipid panel - ordered this visit.  I offered to discuss advanced care planning,  including how to pick a person who would make decisions for you if you were unable to make them for yourself, called a health care power of , and what kind of decisions you might make such as use of life sustaining treatments such as ventilators and tube feeding when faced with a life limiting illness recorded on a living will that they will need to know. (How you want to be cared for as you near the end of your natural life)     X Patient is interested in learning more about how to make advanced directives.  I provided them paperwork and offered to discuss this with them.

## 2024-03-02 PROCEDURE — G0179 MD RECERTIFICATION HHA PT: HCPCS | Mod: ,,, | Performed by: INTERNAL MEDICINE

## 2024-03-06 ENCOUNTER — DOCUMENT SCAN (OUTPATIENT)
Dept: HOME HEALTH SERVICES | Facility: HOSPITAL | Age: 89
End: 2024-03-06
Payer: MEDICARE

## 2024-03-07 ENCOUNTER — OFFICE VISIT (OUTPATIENT)
Dept: FAMILY MEDICINE | Facility: CLINIC | Age: 89
End: 2024-03-07
Payer: MEDICARE

## 2024-03-07 VITALS
HEIGHT: 60 IN | WEIGHT: 136 LBS | HEART RATE: 97 BPM | BODY MASS INDEX: 26.7 KG/M2 | OXYGEN SATURATION: 98 % | DIASTOLIC BLOOD PRESSURE: 72 MMHG | TEMPERATURE: 99 F | SYSTOLIC BLOOD PRESSURE: 130 MMHG | RESPIRATION RATE: 18 BRPM

## 2024-03-07 DIAGNOSIS — I10 ESSENTIAL HYPERTENSION: Chronic | ICD-10-CM

## 2024-03-07 DIAGNOSIS — R73.9 HYPERGLYCEMIA: Chronic | ICD-10-CM

## 2024-03-07 DIAGNOSIS — R26.9 ABNORMALITY OF GAIT AND MOBILITY: ICD-10-CM

## 2024-03-07 DIAGNOSIS — Z00.00 ENCOUNTER FOR SUBSEQUENT ANNUAL WELLNESS VISIT (AWV) IN MEDICARE PATIENT: Primary | ICD-10-CM

## 2024-03-07 DIAGNOSIS — I49.1 PREMATURE ATRIAL CONTRACTIONS: Chronic | ICD-10-CM

## 2024-03-07 DIAGNOSIS — K57.90 DIVERTICULAR DISEASE: Chronic | ICD-10-CM

## 2024-03-07 DIAGNOSIS — R63.4 LOSS OF WEIGHT: ICD-10-CM

## 2024-03-07 DIAGNOSIS — D64.9 ANEMIA, UNSPECIFIED TYPE: Chronic | ICD-10-CM

## 2024-03-07 DIAGNOSIS — Z74.09 OTHER REDUCED MOBILITY: ICD-10-CM

## 2024-03-07 LAB
ANION GAP SERPL CALCULATED.3IONS-SCNC: 6 MMOL/L (ref 7–16)
BUN SERPL-MCNC: 20 MG/DL (ref 7–18)
BUN/CREAT SERPL: 22 (ref 6–20)
CALCIUM SERPL-MCNC: 9.3 MG/DL (ref 8.5–10.1)
CHLORIDE SERPL-SCNC: 103 MMOL/L (ref 98–107)
CHOLEST SERPL-MCNC: 143 MG/DL (ref 0–200)
CHOLEST/HDLC SERPL: 2.3 {RATIO}
CO2 SERPL-SCNC: 33 MMOL/L (ref 21–32)
CREAT SERPL-MCNC: 0.89 MG/DL (ref 0.55–1.02)
EGFR (NO RACE VARIABLE) (RUSH/TITUS): 62 ML/MIN/1.73M2
GLUCOSE SERPL-MCNC: 127 MG/DL (ref 74–106)
HDLC SERPL-MCNC: 61 MG/DL (ref 40–60)
LDLC SERPL CALC-MCNC: 49 MG/DL
NONHDLC SERPL-MCNC: 82 MG/DL
POTASSIUM SERPL-SCNC: 4.1 MMOL/L (ref 3.5–5.1)
SODIUM SERPL-SCNC: 138 MMOL/L (ref 136–145)
TRIGL SERPL-MCNC: 165 MG/DL (ref 35–150)
VLDLC SERPL-MCNC: 33 MG/DL

## 2024-03-07 PROCEDURE — 80061 LIPID PANEL: CPT | Mod: ,,, | Performed by: CLINICAL MEDICAL LABORATORY

## 2024-03-07 PROCEDURE — 80048 BASIC METABOLIC PNL TOTAL CA: CPT | Mod: ,,, | Performed by: CLINICAL MEDICAL LABORATORY

## 2024-03-07 PROCEDURE — G0439 PPPS, SUBSEQ VISIT: HCPCS | Mod: ,,, | Performed by: NURSE PRACTITIONER

## 2024-03-07 NOTE — PATIENT INSTRUCTIONS
Counseling and Referral of Other Preventative  (Italic type indicates deductible and co-insurance are waived)    Patient Name: Purnima Cardona  Today's Date: 3/7/2024    Health Maintenance       Date Due Completion Date    COVID-19 Vaccine (1) Never done ---    TETANUS VACCINE Never done ---    Shingles Vaccine (1 of 2) Never done ---    RSV Vaccine (Age 60+ and Pregnant patients) (1 - 1-dose 60+ series) Never done ---    Pneumococcal Vaccines (Age 65+) (1 of 1 - PCV) Never done ---    Influenza Vaccine (1) 09/01/2023 10/27/2022 (Declined)    Override on 10/27/2022: Declined    Lipid Panel 05/22/2025 5/22/2020        No orders of the defined types were placed in this encounter.    The following information is provided to all patients.  This information is to help you find resources for any of the problems found today that may be affecting your health:                  Living healthy guide: ms.gov    Understanding Diabetes: www.diabetes.org      Eating healthy: www.cdc.gov/healthyweight      CDC home safety checklist: www.cdc.gov/steadi/patient.html      Agency on Aging: ms.gov    Alcoholics anonymous (AA): www.aa.org      Physical Activity: www.brad.nih.gov/nd5hfgg      Tobacco use: ms.gov

## 2024-03-08 DIAGNOSIS — R73.9 HYPERGLYCEMIA: Primary | ICD-10-CM

## 2024-03-08 LAB
EST. AVERAGE GLUCOSE BLD GHB EST-MCNC: 137 MG/DL
HBA1C MFR BLD HPLC: 6.4 % (ref 4.5–6.6)

## 2024-03-08 PROCEDURE — 83036 HEMOGLOBIN GLYCOSYLATED A1C: CPT | Mod: ,,, | Performed by: CLINICAL MEDICAL LABORATORY

## 2024-03-13 ENCOUNTER — TELEPHONE (OUTPATIENT)
Dept: CARDIOLOGY | Facility: CLINIC | Age: 89
End: 2024-03-13
Payer: MEDICARE

## 2024-03-13 NOTE — TELEPHONE ENCOUNTER
After speaking to Dr. Dowell he wanted patient to come in due to him not being sure what is going on with the patient. The daughter could not get patient in to be seen until 4/1/24 and Dr. Dowell said that he will call in Zaroxyln 5mg q d X3 days but it is only until he can see the patient to find out what is going on.   I informed daughter and the homehealth nurse vaishali of this along with making sure they knew to call on 3/19 or 3/20 to see how it has done.  Patient daughter and Vaishali verbalized understaning  -HW

## 2024-03-13 NOTE — TELEPHONE ENCOUNTER
Spoke to home health nurse Vaishali. She states patient has 2+ pitting edema in bilateral lower extremities and the 20mg Lasix patient is taking a day is not helping. She is wanting to know if Dr. Dowell can get something to help with this.   I let her know Dr. Dowell is in Cath lab and hospital that I will speak to him when he comes back to the clinic today.   Verbalized understanding  -HW

## 2024-03-14 ENCOUNTER — OFFICE VISIT (OUTPATIENT)
Dept: CARDIOLOGY | Facility: CLINIC | Age: 89
End: 2024-03-14
Payer: MEDICARE

## 2024-03-14 VITALS
BODY MASS INDEX: 29.06 KG/M2 | DIASTOLIC BLOOD PRESSURE: 92 MMHG | WEIGHT: 148 LBS | HEIGHT: 60 IN | OXYGEN SATURATION: 97 % | SYSTOLIC BLOOD PRESSURE: 150 MMHG | HEART RATE: 105 BPM

## 2024-03-14 DIAGNOSIS — I10 ESSENTIAL HYPERTENSION: Primary | ICD-10-CM

## 2024-03-14 DIAGNOSIS — I49.1 PREMATURE ATRIAL CONTRACTIONS: ICD-10-CM

## 2024-03-14 DIAGNOSIS — R42 ORTHOSTATIC DIZZINESS: ICD-10-CM

## 2024-03-14 DIAGNOSIS — R60.0 LOWER EXTREMITY EDEMA: ICD-10-CM

## 2024-03-14 PROCEDURE — 99214 OFFICE O/P EST MOD 30 MIN: CPT | Mod: S$PBB,,, | Performed by: INTERNAL MEDICINE

## 2024-03-14 PROCEDURE — 99213 OFFICE O/P EST LOW 20 MIN: CPT | Mod: PBBFAC | Performed by: INTERNAL MEDICINE

## 2024-03-14 RX ORDER — METOLAZONE 5 MG/1
5 TABLET ORAL DAILY
Qty: 3 TABLET | Refills: 0 | Status: SHIPPED | OUTPATIENT
Start: 2024-03-14 | End: 2024-03-14 | Stop reason: SDUPTHER

## 2024-03-14 RX ORDER — METOLAZONE 5 MG/1
5 TABLET ORAL DAILY
Qty: 60 TABLET | Refills: 1 | Status: SHIPPED | OUTPATIENT
Start: 2024-03-14 | End: 2024-06-12

## 2024-04-01 ENCOUNTER — EXTERNAL HOME HEALTH (OUTPATIENT)
Dept: HOME HEALTH SERVICES | Facility: HOSPITAL | Age: 89
End: 2024-04-01
Payer: MEDICARE

## 2024-04-02 ENCOUNTER — OFFICE VISIT (OUTPATIENT)
Dept: FAMILY MEDICINE | Facility: CLINIC | Age: 89
End: 2024-04-02
Payer: MEDICARE

## 2024-04-02 VITALS
TEMPERATURE: 97 F | OXYGEN SATURATION: 97 % | HEART RATE: 81 BPM | HEIGHT: 60 IN | SYSTOLIC BLOOD PRESSURE: 136 MMHG | BODY MASS INDEX: 28.86 KG/M2 | WEIGHT: 147 LBS | DIASTOLIC BLOOD PRESSURE: 66 MMHG | RESPIRATION RATE: 20 BRPM

## 2024-04-02 DIAGNOSIS — F32.1 MAJOR DEPRESSIVE DISORDER, SINGLE EPISODE, MODERATE: ICD-10-CM

## 2024-04-02 DIAGNOSIS — Z13.31 POSITIVE DEPRESSION SCREENING: Primary | ICD-10-CM

## 2024-04-02 DIAGNOSIS — R11.0 NAUSEA: ICD-10-CM

## 2024-04-02 LAB
ANION GAP SERPL CALCULATED.3IONS-SCNC: 12 MMOL/L (ref 7–16)
BUN SERPL-MCNC: 30 MG/DL (ref 7–18)
BUN/CREAT SERPL: 26 (ref 6–20)
CALCIUM SERPL-MCNC: 9.6 MG/DL (ref 8.5–10.1)
CHLORIDE SERPL-SCNC: 96 MMOL/L (ref 98–107)
CO2 SERPL-SCNC: 34 MMOL/L (ref 21–32)
CREAT SERPL-MCNC: 1.14 MG/DL (ref 0.55–1.02)
EGFR (NO RACE VARIABLE) (RUSH/TITUS): 46 ML/MIN/1.73M2
GLUCOSE SERPL-MCNC: 185 MG/DL (ref 74–106)
POTASSIUM SERPL-SCNC: 3.6 MMOL/L (ref 3.5–5.1)
SODIUM SERPL-SCNC: 138 MMOL/L (ref 136–145)

## 2024-04-02 PROCEDURE — 80048 BASIC METABOLIC PNL TOTAL CA: CPT | Mod: ,,, | Performed by: CLINICAL MEDICAL LABORATORY

## 2024-04-02 PROCEDURE — 99213 OFFICE O/P EST LOW 20 MIN: CPT | Mod: ,,, | Performed by: NURSE PRACTITIONER

## 2024-04-02 RX ORDER — SERTRALINE HYDROCHLORIDE 25 MG/1
25 TABLET, FILM COATED ORAL DAILY
Qty: 30 TABLET | Refills: 11 | Status: SHIPPED | OUTPATIENT
Start: 2024-04-02 | End: 2024-04-19

## 2024-04-02 RX ORDER — AMLODIPINE BESYLATE 2.5 MG/1
2.5 TABLET ORAL DAILY
COMMUNITY
Start: 2024-03-15

## 2024-04-02 RX ORDER — ONDANSETRON 4 MG/1
4 TABLET, FILM COATED ORAL EVERY 8 HOURS PRN
Qty: 30 TABLET | Refills: 0 | Status: SHIPPED | OUTPATIENT
Start: 2024-04-02

## 2024-04-02 NOTE — PROGRESS NOTES
SUMAN Ayala        PATIENT NAME: Purnima Cardona  : 1934  DATE: 24  MRN: [id number]      Patient PCP Information       Provider PCP Type    Candida TAMY SUMAN Slaughter General            Reason for Visit / Chief Complaint: Panic Attack (Pt presents to the clinic for panic attack her daughter would like to know if there is something for her to take to slow her down her mind is in over drive she is complaining about eveything finding an excuse to make something wrong)       Update PCP  Update Chief Complaint         History of Present Illness / Problem Focused Workflow     Purnima Cardona presents to the clinic with Panic Attack (Pt presents to the clinic for panic attack her daughter would like to know if there is something for her to take to slow her down her mind is in over drive she is complaining about eveything finding an excuse to make something wrong)     HPI  Ms Cardona presents to clinic with daughter for evaluation. Daughter reports patient had increased anxiety and panic attack at [employer] today. State patient obsesses over small things.   Patient has prior hx of HTN, PACs, lower ext edema, hyperlipidemia and SOB. Prior GI bleed resolved.   Patient recently seen by Cardiology, zaroxyln added daily to lasix and bp regimen for ankle edema.   Swelling has improved since starting. Weight at baseline. Patient states she in amoxicillin for dental work.   Has been feeling nauseated while taking. Not eating lunch or dinner with afternoon dosing.   Instructed patient antibiotic might cause nausea if taking on empty stomach.   Discussed need to increase oral hydration and eat three meals daily.   Discussed with patient and daughter signs and symptoms of anxiety and panic attacks.   Patient states she does to identify being anxious but does frett over things at times.     Review of Systems     Review of Systems   Constitutional:  Negative for activity change, appetite change, chills, diaphoresis, fatigue,  fever and unexpected weight change.   HENT:  Negative for congestion, ear pain, facial swelling, hearing loss, nosebleeds and sore throat.    Eyes: Negative.    Respiratory:  Negative for apnea, cough, shortness of breath and wheezing.    Cardiovascular:  Negative for chest pain, palpitations and leg swelling.   Gastrointestinal:  Negative for abdominal distention, abdominal pain, blood in stool, constipation, diarrhea and nausea.   Endocrine: Negative for cold intolerance, heat intolerance, polydipsia, polyphagia and polyuria.   Genitourinary:  Negative for decreased urine volume, difficulty urinating, dysuria, flank pain, frequency, hematuria and urgency.   Musculoskeletal:  Negative for arthralgias, joint swelling and myalgias.   Skin:  Negative for color change and rash.   Allergic/Immunologic: Negative.    Neurological:  Negative for dizziness, tremors, seizures, syncope, facial asymmetry, speech difficulty, weakness, light-headedness, numbness and headaches.   Hematological:  Negative for adenopathy. Does not bruise/bleed easily.   Psychiatric/Behavioral:  Negative for behavioral problems and confusion. The patient is nervous/anxious.        Medical / Social / Family History     Past Medical History:   Diagnosis Date    Abnormal EKG     Essential hypertension     Hyperlipidemia     Shortness of breath        Past Surgical History:   Procedure Laterality Date    CHOLECYSTECTOMY      EYE SURGERY      cataract removal    HYSTERECTOMY         Social History  Ms.  reports that she has never smoked. She has never been exposed to tobacco smoke. She has never used smokeless tobacco. She reports that she does not drink alcohol and does not use drugs.    Family History  Ms.'s family history includes Breast cancer in her mother; Stroke in her father.    Medications and Allergies     Medications  Outpatient Medications Marked as Taking for the 4/2/24 encounter (Office Visit) with Candida Slaughter FNP   Medication Sig  Dispense Refill    acetaminophen (TYLENOL) 500 MG tablet Take 500 mg by mouth every 6 (six) hours as needed for Pain.      amLODIPine (NORVASC) 2.5 MG tablet Take 2.5 mg by mouth once daily.      calcium carbonate (OS-NASREEN) 600 mg calcium (1,500 mg) Tab Take 600 mg by mouth once daily. Take 1 tablet po BID      carvediloL (COREG) 3.125 MG tablet Take 1 tablet (3.125 mg total) by mouth 2 (two) times daily with meals. 60 tablet 11    furosemide (LASIX) 20 MG tablet Take 1 tablet (20 mg total) by mouth once daily. 90 tablet 3    lisinopriL (PRINIVIL,ZESTRIL) 40 MG tablet Take 1 tablet (40 mg total) by mouth once daily. 90 tablet 3    metOLazone (ZAROXOLYN) 5 MG tablet Take 1 tablet (5 mg total) by mouth once daily. for 90 doses 60 tablet 1    multivit-min/ferrous fumarate (MULTI VITAMIN ORAL) Take by mouth.      pantoprazole (PROTONIX) 40 MG tablet Take 1 tablet (40 mg total) by mouth once daily. 90 tablet 4    potassium chloride (KLOR-CON) 8 MEQ TbSR Take 1 tablet (8 mEq total) by mouth once daily. for 360 doses 90 tablet 3       Allergies  Review of patient's allergies indicates:   Allergen Reactions    Clonidine     Hiprex [methenamine hippurate]     Sulfa (sulfonamide antibiotics)        Physical Examination     Vitals:    04/02/24 1338   BP: 136/66   BP Location: Right arm   Patient Position: Sitting   BP Method: Medium (Automatic)   Pulse: 81   Resp: 20   Temp: 97 °F (36.1 °C)   TempSrc: Oral   SpO2: 97%   Weight: 66.7 kg (147 lb)   Height: 5' (1.524 m)     Over the last two weeks how often have you been bothered by little interest or pleasure in doing things: 2  Over the last two weeks how often have you been bothered by feeling down, depressed or hopeless: 2  PHQ-2 Total Score: 4  PHQ-9 Score: 10  PHQ-9 Interpretation: Moderate      Physical Exam  Vitals and nursing note reviewed.   Constitutional:       Appearance: Normal appearance. She is normal weight.   HENT:      Head: Normocephalic.      Right Ear:  External ear normal.      Left Ear: External ear normal.      Nose: Nose normal.      Mouth/Throat:      Mouth: Mucous membranes are moist.   Eyes:      Extraocular Movements: Extraocular movements intact.      Conjunctiva/sclera: Conjunctivae normal.      Pupils: Pupils are equal, round, and reactive to light.   Cardiovascular:      Rate and Rhythm: Normal rate and regular rhythm.      Pulses: Normal pulses.      Heart sounds: Normal heart sounds. No murmur heard.  Pulmonary:      Effort: Pulmonary effort is normal.      Breath sounds: Normal breath sounds. No stridor. No wheezing or rhonchi.   Abdominal:      General: Bowel sounds are normal. There is no distension.      Palpations: Abdomen is soft. There is no mass.      Tenderness: There is no abdominal tenderness.   Musculoskeletal:         General: No swelling or tenderness. Normal range of motion.      Cervical back: Normal range of motion and neck supple.      Right lower leg: No edema.      Left lower leg: No edema.   Skin:     General: Skin is warm and dry.      Capillary Refill: Capillary refill takes less than 2 seconds.   Neurological:      General: No focal deficit present.      Mental Status: She is alert and oriented to person, place, and time. Mental status is at baseline.      Cranial Nerves: No cranial nerve deficit.      Sensory: No sensory deficit.      Motor: No weakness.   Psychiatric:         Mood and Affect: Mood normal.         Behavior: Behavior normal.         Thought Content: Thought content normal.         Judgment: Judgment normal.           Office Visit on 04/02/2024   Component Date Value Ref Range Status    Sodium 04/02/2024 138  136 - 145 mmol/L Final    Potassium 04/02/2024 3.6  3.5 - 5.1 mmol/L Final    Chloride 04/02/2024 96 (L)  98 - 107 mmol/L Final    CO2 04/02/2024 34 (H)  21 - 32 mmol/L Final    Anion Gap 04/02/2024 12  7 - 16 mmol/L Final    Glucose 04/02/2024 185 (H)  74 - 106 mg/dL Final    BUN 04/02/2024 30 (H)  7 - 18  mg/dL Final    Creatinine 04/02/2024 1.14 (H)  0.55 - 1.02 mg/dL Final    BUN/Creatinine Ratio 04/02/2024 26 (H)  6 - 20 Final    Calcium 04/02/2024 9.6  8.5 - 10.1 mg/dL Final    eGFR 04/02/2024 46 (L)  >=60 mL/min/1.73m2 Final             Assessment and Plan (including Health Maintenance)      Problem List  Smart Sets  Document Outside HM   :    Plan:   Positive depression screening  Comments:  I have reviewed the positive depression score with the patient and found that no additional intervention is needed at this time.  Orders:  -     sertraline (ZOLOFT) 25 MG tablet; Take 1 tablet (25 mg total) by mouth once daily.  Dispense: 30 tablet; Refill: 11    Major depressive disorder, single episode, moderate  -     Basic Metabolic Panel; Future; Expected date: 04/02/2024  -     sertraline (ZOLOFT) 25 MG tablet; Take 1 tablet (25 mg total) by mouth once daily.  Dispense: 30 tablet; Refill: 11    Nausea  -     ondansetron (ZOFRAN) 4 MG tablet; Take 1 tablet (4 mg total) by mouth every 8 (eight) hours as needed for Nausea.  Dispense: 30 tablet; Refill: 0     RTC in 2 weeks   Increase PO intake and oral hydration   There are no Patient Instructions on file for this visit.       Health Maintenance Due   Topic Date Due    COVID-19 Vaccine (1) Never done    TETANUS VACCINE  Never done    Shingles Vaccine (1 of 2) Never done    RSV Vaccine (Age 60+ and Pregnant patients) (1 - 1-dose 60+ series) Never done    Pneumococcal Vaccines (Age 65+) (1 of 1 - PCV) Never done    Influenza Vaccine (1) 09/01/2023       Most Recent Immunizations   Administered Date(s) Administered    PPD Test 07/20/2022        Problem List Items Addressed This Visit          Psychiatric    Major depressive disorder, single episode, moderate    Relevant Medications    sertraline (ZOLOFT) 25 MG tablet    Other Relevant Orders    Basic Metabolic Panel (Completed)     Other Visit Diagnoses       Positive depression screening    -  Primary    I have reviewed  the positive depression score with the patient and found that no additional intervention is needed at this time.    Relevant Medications    sertraline (ZOLOFT) 25 MG tablet    Nausea        Relevant Medications    ondansetron (ZOFRAN) 4 MG tablet            Health Maintenance Topics with due status: Not Due       Topic Last Completion Date    Lipid Panel 03/07/2024       Future Appointments   Date Time Provider Department Center   4/17/2024  2:40 PM Jose Purcell MD [facility] [facility]   4/18/2024  3:15 PM Candida Slaughter FNP [facility]   6/6/2024  3:15 PM Candida Slaughter FNP [facility]   6/20/2024  2:45 PM Jose Dowell DO [facility] CARD [facility]   3/12/2025  1:00 PM AWREHANA NURSE, [facility] FAMILY MEDICINE [facility]            Signature:  SUMAN Ayala  [facility]     Date of encounter: 4/2/24

## 2024-04-16 ENCOUNTER — OFFICE VISIT (OUTPATIENT)
Dept: OTOLARYNGOLOGY | Facility: CLINIC | Age: 89
End: 2024-04-16
Payer: MEDICARE

## 2024-04-16 VITALS — BODY MASS INDEX: 28.86 KG/M2 | WEIGHT: 147 LBS | HEIGHT: 60 IN

## 2024-04-16 DIAGNOSIS — H90.2 CONDUCTIVE HEARING LOSS, UNSPECIFIED LATERALITY: Primary | ICD-10-CM

## 2024-04-16 DIAGNOSIS — H61.23 BILATERAL IMPACTED CERUMEN: ICD-10-CM

## 2024-04-16 PROCEDURE — 69210 REMOVE IMPACTED EAR WAX UNI: CPT | Mod: S$PBB,,, | Performed by: OTOLARYNGOLOGY

## 2024-04-16 PROCEDURE — 99214 OFFICE O/P EST MOD 30 MIN: CPT | Mod: 25,S$PBB,, | Performed by: OTOLARYNGOLOGY

## 2024-04-16 PROCEDURE — 69210 REMOVE IMPACTED EAR WAX UNI: CPT | Mod: 50,PBBFAC | Performed by: OTOLARYNGOLOGY

## 2024-04-16 PROCEDURE — 99213 OFFICE O/P EST LOW 20 MIN: CPT | Mod: PBBFAC,25 | Performed by: OTOLARYNGOLOGY

## 2024-04-16 NOTE — PROGRESS NOTES
Subjective:       Patient ID: Purnima Cardona is a 89 y.o. female.    Chief Complaint: Cerumen Impaction (Patient to have her ears cleaned.) and Follow-up (6 month follow up.)    Follow-up      Review of Systems   HENT:  Positive for hearing loss. Negative for ear pain.    All other systems reviewed and are negative.      Objective:      Physical Exam  General: NAD  Head: Normocephalic, atraumatic, no facial asymmetry/normal strength,  Ears: Both auricules normal in appearance, w/o deformities tympanic membranes normal external auditory canals bilateral impactions   Nose: External nose w/o deformities normal turbinates no drainage or inflammation  Oral Cavity: Lips, gums, floor of mouth, tongue hard palate, and buccal mucosa without mass/lesion  Oropharynx: Mucosa pink and moist, soft palate, posterior pharynx and oropharyngeal wall without mass/lesion  Neck: Supple, symmetric, trachea midline, no palpable mass/lesion, no palpable cervical lymphadenopathy  Skin: Warm and dry, no concerning lesions  Respiratory: Respirations even, unlabored  Procedure: Binocular microscopy with removal of cerumen impaction using microsurgical instrumentation.  After explaining the procedure and obtaining verbal assent,  each external auditory canal visualized with the 250 mm focal length lens through the operating microscope. The obstructing cerumen was removed with microsurgical instrumentation to reveal normal and healthy external auditory canals. The patient tolerated this procedure well without complication.    Assessment:       1. Conductive hearing loss, unspecified laterality    2. Bilateral impacted cerumen        Plan:       F/u 6 months

## 2024-04-18 ENCOUNTER — PATIENT MESSAGE (OUTPATIENT)
Dept: FAMILY MEDICINE | Facility: CLINIC | Age: 89
End: 2024-04-18
Payer: MEDICARE

## 2024-04-18 ENCOUNTER — OFFICE VISIT (OUTPATIENT)
Dept: FAMILY MEDICINE | Facility: CLINIC | Age: 89
End: 2024-04-18
Payer: MEDICARE

## 2024-04-18 VITALS
OXYGEN SATURATION: 97 % | RESPIRATION RATE: 18 BRPM | DIASTOLIC BLOOD PRESSURE: 91 MMHG | WEIGHT: 143 LBS | SYSTOLIC BLOOD PRESSURE: 165 MMHG | TEMPERATURE: 98 F | BODY MASS INDEX: 28.07 KG/M2 | HEIGHT: 60 IN | HEART RATE: 108 BPM

## 2024-04-18 DIAGNOSIS — R60.0 LOWER EXTREMITY EDEMA: ICD-10-CM

## 2024-04-18 DIAGNOSIS — F32.1 MAJOR DEPRESSIVE DISORDER, SINGLE EPISODE, MODERATE: ICD-10-CM

## 2024-04-18 DIAGNOSIS — I10 ESSENTIAL HYPERTENSION: Primary | ICD-10-CM

## 2024-04-18 PROCEDURE — 99212 OFFICE O/P EST SF 10 MIN: CPT | Mod: ,,, | Performed by: NURSE PRACTITIONER

## 2024-04-18 NOTE — PROGRESS NOTES
SUMAN Ayala        PATIENT NAME: Purnima Cardona  : 1934  DATE: 24  MRN: [id number]      Patient PCP Information       Provider PCP Type    Candida MORELOS SUMAN Slaughter General            Reason for Visit / Chief Complaint: Follow-up (Bp check / anxiety)       Update PCP  Update Chief Complaint         History of Present Illness / Problem Focused Workflow     Purnima Cardona presents to the clinic with Follow-up (Bp check / anxiety)     Follow-up      Ms Cardona presents to clinic for follow up for anxiety and bp check. Patient stopped zoloft. States she did not like the way it made her feel. Does not think she needs anxiety medications. Patient denies panic attack since last appt. Patient bp elevated. States she did take bp medication this am. Patient ankle swelling improved with addition of zaroxalyn to lasix. Patient was prev taken off ccb due to ankle swelling however restarted on low dose amlodipine, along with lisinopril and low dose coreg.     Medical / Social / Family History     Past Medical History:   Diagnosis Date    Abnormal EKG     Essential hypertension     Hyperlipidemia     Shortness of breath        Past Surgical History:   Procedure Laterality Date    CHOLECYSTECTOMY      EYE SURGERY      cataract removal    HYSTERECTOMY         Social History  Ms.  reports that she has never smoked. She has never been exposed to tobacco smoke. She has never used smokeless tobacco. She reports that she does not drink alcohol and does not use drugs.    Family History  Ms.'s family history includes Breast cancer in her mother; Stroke in her father.    Medications and Allergies     Medications  Current Outpatient Medications   Medication Sig Dispense Refill    acetaminophen (TYLENOL) 500 MG tablet Take 500 mg by mouth every 6 (six) hours as needed for Pain.      amLODIPine (NORVASC) 2.5 MG tablet Take 2.5 mg by mouth once daily.      calcium carbonate (OS-NASREEN) 600 mg calcium (1,500 mg) Tab Take 600 mg by  mouth once daily. Take 1 tablet po BID      furosemide (LASIX) 20 MG tablet Take 1 tablet (20 mg total) by mouth once daily. 90 tablet 3    lisinopriL (PRINIVIL,ZESTRIL) 40 MG tablet Take 1 tablet (40 mg total) by mouth once daily. 90 tablet 3    metOLazone (ZAROXOLYN) 5 MG tablet Take 1 tablet (5 mg total) by mouth once daily. for 90 doses 60 tablet 1    multivit-min/ferrous fumarate (MULTI VITAMIN ORAL) Take by mouth.      ondansetron (ZOFRAN) 4 MG tablet Take 1 tablet (4 mg total) by mouth every 8 (eight) hours as needed for Nausea. 30 tablet 0    pantoprazole (PROTONIX) 40 MG tablet Take 1 tablet (40 mg total) by mouth once daily. 90 tablet 4    potassium chloride (KLOR-CON) 8 MEQ TbSR Take 1 tablet (8 mEq total) by mouth once daily. for 360 doses 90 tablet 3    carvediloL (COREG) 6.25 MG tablet Take 1 tablet (6.25 mg total) by mouth 2 (two) times daily with meals. 180 tablet 3     No current facility-administered medications for this visit.       Allergies  Review of patient's allergies indicates:   Allergen Reactions    Clonidine     Hiprex [methenamine hippurate]     Sulfa (sulfonamide antibiotics)        Physical Examination     Vitals:    04/18/24 1520 04/18/24 1521   BP: (!) 162/75 (!) 165/91   BP Location: Left arm Right arm   Patient Position: Sitting Sitting   BP Method: Medium (Automatic) Medium (Automatic)   Pulse: 108    Resp: 18    Temp: 98 °F (36.7 °C)    TempSrc: Oral    SpO2: 97%    Weight: 64.9 kg (143 lb)    Height: 5' (1.524 m)        Physical Exam  Vitals and nursing note reviewed.   Constitutional:       Appearance: Normal appearance. She is normal weight.      Comments: Ambulatory with walker denies falls   HENT:      Head: Normocephalic.      Right Ear: External ear normal.      Left Ear: External ear normal.      Nose: Nose normal.      Mouth/Throat:      Mouth: Mucous membranes are moist.   Eyes:      Extraocular Movements: Extraocular movements intact.   Cardiovascular:      Rate and  Rhythm: Normal rate and regular rhythm.      Pulses: Normal pulses.      Heart sounds: Normal heart sounds. No murmur heard.  Pulmonary:      Effort: Pulmonary effort is normal.      Breath sounds: Normal breath sounds. No stridor. No wheezing or rhonchi.   Abdominal:      General: Bowel sounds are normal. There is no distension.      Palpations: Abdomen is soft. There is no mass.      Tenderness: There is no abdominal tenderness.   Musculoskeletal:         General: No swelling or tenderness. Normal range of motion.      Cervical back: Normal range of motion and neck supple.      Right lower leg: Edema present.      Left lower leg: Edema present.      Comments: Mild ankle edema, improved    Skin:     General: Skin is warm and dry.      Capillary Refill: Capillary refill takes less than 2 seconds.   Neurological:      General: No focal deficit present.      Mental Status: She is alert and oriented to person, place, and time. Mental status is at baseline.      Cranial Nerves: No cranial nerve deficit.      Sensory: No sensory deficit.      Motor: No weakness.   Psychiatric:         Mood and Affect: Mood normal.         Behavior: Behavior normal.         Thought Content: Thought content normal.         Judgment: Judgment normal.           No visits with results within 14 Day(s) from this visit.   Latest known visit with results is:   Office Visit on 04/02/2024   Component Date Value Ref Range Status    Sodium 04/02/2024 138  136 - 145 mmol/L Final    Potassium 04/02/2024 3.6  3.5 - 5.1 mmol/L Final    Chloride 04/02/2024 96 (L)  98 - 107 mmol/L Final    CO2 04/02/2024 34 (H)  21 - 32 mmol/L Final    Anion Gap 04/02/2024 12  7 - 16 mmol/L Final    Glucose 04/02/2024 185 (H)  74 - 106 mg/dL Final    BUN 04/02/2024 30 (H)  7 - 18 mg/dL Final    Creatinine 04/02/2024 1.14 (H)  0.55 - 1.02 mg/dL Final    BUN/Creatinine Ratio 04/02/2024 26 (H)  6 - 20 Final    Calcium 04/02/2024 9.6  8.5 - 10.1 mg/dL Final    eGFR 04/02/2024  46 (L)  >=60 mL/min/1.73m2 Final             Assessment and Plan (including Health Maintenance)      Problem List  Smart Sets  Document Outside HM   :    Plan:     1. Essential hypertension  Overview:  uncontrolled, reviewed low sodium diet, recs for increasing exercise, starting with five minutes gentle walking AM and PM.  Also recommend AM daily weights      Assessment & Plan:  Patient messaged home BP reading which also remained elevated.   Continue amlodipine 2.5 mg daily  Continue lisinopril 40 mg daily  Continue lasix 20 mg daily / potssaium 8 mEq daily  Continue zaroxolyn 5 mg daily  Increase coreg to 6.25 mg BID        2. Major depressive disorder, single episode, moderate  Assessment & Plan:  Patient does not wish to take medication at this time. States anxiety / depression stable. Patient did not tolerate zoloft.       3. Lower extremity edema  Overview:  Start Lasix 20 mg daily.  Discontinue HCTZ, monitor weight, call office if not decreased by 3 lbs in five days     Assessment & Plan:  Continue lasix 20 mg daily / potassium  Zaroxolyn 5 mg daily  Compression stockings  Walking daily  Low salt diet      No follow-ups on file.  Follow up in June as scheduled.   There are no Patient Instructions on file for this visit.       Health Maintenance Due   Topic Date Due    TETANUS VACCINE  Never done    Shingles Vaccine (1 of 2) Never done    RSV Vaccine (Age 60+ and Pregnant patients) (1 - 1-dose 60+ series) Never done    Pneumococcal Vaccines (Age 65+) (1 of 1 - PCV) Never done    Influenza Vaccine (1) 09/01/2023    COVID-19 Vaccine (1 - 2023-24 season) Never done       Most Recent Immunizations   Administered Date(s) Administered    PPD Test 07/20/2022            Health Maintenance Topics with due status: Not Due       Topic Last Completion Date    Lipid Panel 03/07/2024       Future Appointments   Date Time Provider Department Center   6/6/2024  3:15 PM Candida Slaughter FNP [facility]    6/20/2024  2:45 PM Jose Dowell,  [facility] CARD [place] [facility]   10/17/2024  3:00 PM Jose Purcell MD [facility]   3/12/2025  1:00 PM AWV NURSE, [facility] FAMILY MEDICINE [facility]            Signature:  SUMAN Ayala  [facility]     Date of encounter: 4/18/24

## 2024-04-19 RX ORDER — CARVEDILOL 6.25 MG/1
6.25 TABLET ORAL 2 TIMES DAILY WITH MEALS
Qty: 180 TABLET | Refills: 3 | Status: SHIPPED | OUTPATIENT
Start: 2024-04-19 | End: 2025-04-19

## 2024-04-19 NOTE — ASSESSMENT & PLAN NOTE
Patient does not wish to take medication at this time. States anxiety / depression stable. Patient did not tolerate zoloft.

## 2024-04-19 NOTE — ASSESSMENT & PLAN NOTE
Continue lasix 20 mg daily / potassium  Zaroxolyn 5 mg daily  Compression stockings  Walking daily  Low salt diet

## 2024-04-19 NOTE — ASSESSMENT & PLAN NOTE
Patient messaged home BP reading which also remained elevated.   Continue amlodipine 2.5 mg daily  Continue lisinopril 40 mg daily  Continue lasix 20 mg daily / potssaium 8 mEq daily  Continue zaroxolyn 5 mg daily  Increase coreg to 6.25 mg BID

## 2024-04-23 ENCOUNTER — TELEPHONE (OUTPATIENT)
Dept: FAMILY MEDICINE | Facility: CLINIC | Age: 89
End: 2024-04-23
Payer: MEDICARE

## 2024-04-23 VITALS — DIASTOLIC BLOOD PRESSURE: 69 MMHG | SYSTOLIC BLOOD PRESSURE: 143 MMHG

## 2024-04-23 NOTE — TELEPHONE ENCOUNTER
Documented blood pressure of patient given through patient portal messages. BP received 4/18/2024

## 2024-07-09 DIAGNOSIS — Z71.89 COMPLEX CARE COORDINATION: ICD-10-CM

## 2024-07-11 DIAGNOSIS — I10 HYPERTENSION, UNSPECIFIED TYPE: ICD-10-CM

## 2024-07-11 DIAGNOSIS — R60.0 LOWER EXTREMITY EDEMA: ICD-10-CM

## 2024-07-11 RX ORDER — AMLODIPINE BESYLATE 5 MG/1
5 TABLET ORAL DAILY
COMMUNITY
End: 2024-07-11 | Stop reason: SDUPTHER

## 2024-07-11 RX ORDER — AMLODIPINE BESYLATE 2.5 MG/1
2.5 TABLET ORAL
Qty: 30 TABLET | Refills: 5 | OUTPATIENT
Start: 2024-07-11

## 2024-07-11 NOTE — TELEPHONE ENCOUNTER
----- Message from Nat Moore sent at 7/11/2024  3:41 PM CDT -----  Patient called needing a refill on the prescriptions but didn't specify which pharmacy.  amLODIPine (NORVASC) 2.5 MG tablet   lisinopriL (PRINIVIL,ZESTRIL) 40 MG tablet    [phone]

## 2024-07-12 RX ORDER — AMLODIPINE BESYLATE 5 MG/1
5 TABLET ORAL DAILY
Qty: 90 TABLET | Refills: 1 | Status: SHIPPED | OUTPATIENT
Start: 2024-07-12 | End: 2024-07-12

## 2024-07-12 RX ORDER — LISINOPRIL 40 MG/1
40 TABLET ORAL DAILY
Qty: 90 TABLET | Refills: 3 | Status: SHIPPED | OUTPATIENT
Start: 2024-07-12 | End: 2024-07-12

## 2024-07-12 RX ORDER — AMLODIPINE BESYLATE 2.5 MG/1
2.5 TABLET ORAL DAILY
Qty: 90 TABLET | Refills: 1 | Status: SHIPPED | OUTPATIENT
Start: 2024-07-12 | End: 2025-01-08

## 2024-07-12 RX ORDER — LISINOPRIL 40 MG/1
40 TABLET ORAL DAILY
Qty: 90 TABLET | Refills: 1 | Status: SHIPPED | OUTPATIENT
Start: 2024-07-12

## 2024-08-15 ENCOUNTER — OFFICE VISIT (OUTPATIENT)
Dept: CARDIOLOGY | Facility: CLINIC | Age: 89
End: 2024-08-15
Payer: MEDICARE

## 2024-08-15 VITALS
BODY MASS INDEX: 28.07 KG/M2 | WEIGHT: 143 LBS | HEIGHT: 60 IN | OXYGEN SATURATION: 93 % | DIASTOLIC BLOOD PRESSURE: 64 MMHG | SYSTOLIC BLOOD PRESSURE: 128 MMHG | HEART RATE: 88 BPM

## 2024-08-15 DIAGNOSIS — R42 ORTHOSTATIC DIZZINESS: ICD-10-CM

## 2024-08-15 DIAGNOSIS — I49.1 PREMATURE ATRIAL CONTRACTIONS: ICD-10-CM

## 2024-08-15 DIAGNOSIS — R60.0 LOWER EXTREMITY EDEMA: ICD-10-CM

## 2024-08-15 DIAGNOSIS — M54.9 CHRONIC BACK PAIN, UNSPECIFIED BACK LOCATION, UNSPECIFIED BACK PAIN LATERALITY: Primary | ICD-10-CM

## 2024-08-15 DIAGNOSIS — G89.29 CHRONIC BACK PAIN, UNSPECIFIED BACK LOCATION, UNSPECIFIED BACK PAIN LATERALITY: Primary | ICD-10-CM

## 2024-08-15 DIAGNOSIS — I10 ESSENTIAL HYPERTENSION: ICD-10-CM

## 2024-08-15 PROCEDURE — 93010 ELECTROCARDIOGRAM REPORT: CPT | Mod: S$PBB,,, | Performed by: INTERNAL MEDICINE

## 2024-08-15 PROCEDURE — 99214 OFFICE O/P EST MOD 30 MIN: CPT | Mod: S$PBB,,, | Performed by: INTERNAL MEDICINE

## 2024-08-15 PROCEDURE — 99214 OFFICE O/P EST MOD 30 MIN: CPT | Mod: PBBFAC | Performed by: INTERNAL MEDICINE

## 2024-08-15 PROCEDURE — 99999 PR PBB SHADOW E&M-EST. PATIENT-LVL IV: CPT | Mod: PBBFAC,,, | Performed by: INTERNAL MEDICINE

## 2024-08-15 PROCEDURE — 93005 ELECTROCARDIOGRAM TRACING: CPT | Mod: PBBFAC | Performed by: INTERNAL MEDICINE

## 2024-08-15 RX ORDER — FUROSEMIDE 20 MG/1
TABLET ORAL
Qty: 60 TABLET | Refills: 11 | Status: SHIPPED | OUTPATIENT
Start: 2024-08-15

## 2024-08-15 NOTE — PROGRESS NOTES
Cardiology Clinic Note:    PCP: Candida Slaughter FNP    REFERRING PHYSICIAN: Candida Slaughter FNP    CHIEF COMPLAINT:   Chief Complaint   Patient presents with    Follow-up    Shortness of Breath     With exertion mainly     Edema     Lower ext ankles goes down some at night         HISTORY OF PRESENT ILLNESS:  Purnima Cardona is a 89 y.o. female who presents for evaluation of hypertension, shortness of breath, lower extremity swelling.      Pt reports was less active, moved to assisted living, now has started to ambulate more frequently. Her daughter notes she is much more socially interactive, shortness of breath with activity has resolved. . She reports chest pain has resolved.  She  is monitoring blood pressure has been well controlled.  She notes mild bilateral peripheral has reoccurred, did not start on Zaroslyn, is back to one Lasix daily.  She continues to experience bilat lower extremity swelling, is not compliant with sodium restriction or compression stockings. Patient denies dizziness.  Denies chest pain, pressure, tightness or squeezing.      Review of Systems   Constitutional: Negative for diaphoresis, malaise/fatigue, night sweats and weight gain.   HENT:  Negative for congestion, ear pain, hearing loss, nosebleeds and sore throat.    Eyes:  Negative for blurred vision, double vision, pain, photophobia and visual disturbance.   Cardiovascular:  Negative for chest pain, claudication, dyspnea on exertion, irregular heartbeat, leg swelling, near-syncope, orthopnea, palpitations and syncope.   Respiratory:  Negative for cough, shortness of breath, sleep disturbances due to breathing, snoring and wheezing.    Endocrine: Negative for cold intolerance, heat intolerance, polydipsia, polyphagia and polyuria.   Hematologic/Lymphatic: Negative for bleeding problem. Does not bruise/bleed easily.   Skin:  Negative for dry skin, flushing, itching, rash and skin cancer.   Musculoskeletal:  Negative for  arthritis, back pain, falls, joint pain, muscle cramps, muscle weakness and myalgias.   Gastrointestinal:  Negative for abdominal pain, change in bowel habit, constipation, diarrhea, dysphagia, heartburn, nausea and vomiting.   Genitourinary:  Negative for bladder incontinence, dysuria, flank pain, frequency and nocturia.   Neurological:  Negative for dizziness, focal weakness, headaches, light-headedness, loss of balance, numbness, paresthesias and seizures.   Psychiatric/Behavioral:  Negative for depression, memory loss and substance abuse. The patient is not nervous/anxious.    Allergic/Immunologic: Negative for environmental allergies.          PAST MEDICAL HISTORY:  Past Medical History:   Diagnosis Date    Abnormal EKG     Essential hypertension     Hyperlipidemia     Shortness of breath        PAST SURGICAL HISTORY:  Past Surgical History:   Procedure Laterality Date    CHOLECYSTECTOMY      EYE SURGERY      cataract removal    HYSTERECTOMY         SOCIAL HISTORY:  Social History     Socioeconomic History    Marital status:    Tobacco Use    Smoking status: Never     Passive exposure: Never    Smokeless tobacco: Never   Substance and Sexual Activity    Alcohol use: Never    Drug use: Never    Sexual activity: Not Currently     Social Determinants of Health     Financial Resource Strain: Low Risk  (3/7/2024)    Overall Financial Resource Strain (CARDIA)     Difficulty of Paying Living Expenses: Not hard at all   Food Insecurity: No Food Insecurity (3/7/2024)    Hunger Vital Sign     Worried About Running Out of Food in the Last Year: Never true     Ran Out of Food in the Last Year: Never true   Transportation Needs: No Transportation Needs (3/7/2024)    PRAPARE - Transportation     Lack of Transportation (Medical): No     Lack of Transportation (Non-Medical): No   Physical Activity: Insufficiently Active (3/7/2024)    Exercise Vital Sign     Days of Exercise per Week: 2 days     Minutes of Exercise per  Session: 20 min   Stress: Stress Concern Present (3/7/2024)    [employer] [place] [employer] - Occupational Stress Questionnaire     Feeling of Stress : To some extent   Housing Stability: Low Risk  (3/7/2024)    Housing Stability Vital Sign     Unable to Pay for Housing in the Last Year: No     Number of Places Lived in the Last Year: 1     Unstable Housing in the Last Year: No       FAMILY HISTORY:  Family History   Problem Relation Name Age of Onset    Breast cancer Mother      Stroke Father         ALLERGIES:  Allergies as of 08/15/2024 - Reviewed 08/15/2024   Allergen Reaction Noted    Clonidine  03/19/2021    Hiprex [methenamine hippurate]  03/19/2021    Sulfa (sulfonamide antibiotics)  03/19/2021         MEDICATIONS:  Current Outpatient Medications on File Prior to Visit   Medication Sig Dispense Refill    acetaminophen (TYLENOL) 500 MG tablet Take 500 mg by mouth every 6 (six) hours as needed for Pain.      amLODIPine (NORVASC) 2.5 MG tablet Take 1 tablet (2.5 mg total) by mouth once daily. 90 tablet 1    calcium carbonate (OS-NASREEN) 600 mg calcium (1,500 mg) Tab Take 600 mg by mouth once daily. Take 1 tablet po BID      carvediloL (COREG) 6.25 MG tablet Take 1 tablet (6.25 mg total) by mouth 2 (two) times daily with meals. 180 tablet 3    furosemide (LASIX) 20 MG tablet Take 1 tablet (20 mg total) by mouth once daily. 90 tablet 3    lisinopriL (PRINIVIL,ZESTRIL) 40 MG tablet Take 1 tablet (40 mg total) by mouth once daily. 90 tablet 1    multivit-min/ferrous fumarate (MULTI VITAMIN ORAL) Take by mouth.      pantoprazole (PROTONIX) 40 MG tablet Take 1 tablet (40 mg total) by mouth once daily. 90 tablet 4    potassium chloride (KLOR-CON) 8 MEQ TbSR Take 1 tablet (8 mEq total) by mouth once daily. for 360 doses 90 tablet 3    metOLazone (ZAROXOLYN) 5 MG tablet Take 1 tablet (5 mg total) by mouth once daily. for 90 doses (Patient not taking: Reported on 8/15/2024) 60 tablet 1    ondansetron (ZOFRAN) 4  MG tablet Take 1 tablet (4 mg total) by mouth every 8 (eight) hours as needed for Nausea. (Patient not taking: Reported on 8/15/2024) 30 tablet 0     No current facility-administered medications on file prior to visit.          PHYSICAL EXAM:  Blood pressure 128/64, pulse 88, height 5' (1.524 m), weight 64.9 kg (143 lb), SpO2 (!) 93%.  Wt Readings from Last 3 Encounters:   08/15/24 64.9 kg (143 lb)   04/18/24 64.9 kg (143 lb)   04/16/24 66.7 kg (147 lb)      Body mass index is 27.93 kg/m².    Physical Exam  Vitals and nursing note reviewed.   Constitutional:       Appearance: Normal appearance. She is normal weight.   HENT:      Head: Normocephalic and atraumatic.      Right Ear: External ear normal.      Left Ear: External ear normal.   Eyes:      General: No scleral icterus.        Right eye: No discharge.         Left eye: No discharge.      Extraocular Movements: Extraocular movements intact.      Conjunctiva/sclera: Conjunctivae normal.      Pupils: Pupils are equal, round, and reactive to light.   Cardiovascular:      Rate and Rhythm: Normal rate and regular rhythm.      Pulses: Normal pulses.      Heart sounds: Normal heart sounds. No murmur heard.     No friction rub. No gallop.   Pulmonary:      Effort: Pulmonary effort is normal.      Breath sounds: Normal breath sounds. No wheezing, rhonchi or rales.   Chest:      Chest wall: No tenderness.   Abdominal:      General: Abdomen is flat. Bowel sounds are normal. There is no distension.      Palpations: Abdomen is soft.      Tenderness: There is no abdominal tenderness. There is no guarding or rebound.   Musculoskeletal:         General: No swelling or tenderness. Normal range of motion.      Cervical back: Normal range of motion and neck supple.      Right lower leg: Edema present.      Left lower leg: Edema present.   Skin:     General: Skin is warm and dry.      Findings: No erythema or rash.   Neurological:      General: No focal deficit present.       Mental Status: She is alert and oriented to person, place, and time.      Cranial Nerves: No cranial nerve deficit.      Motor: No weakness.      Gait: Gait normal.   Psychiatric:         Mood and Affect: Mood normal.         Behavior: Behavior normal.         Thought Content: Thought content normal.         Judgment: Judgment normal.          LABS REVIEWED:  Lab Results   Component Value Date    WBC 8.19 10/16/2023    RBC 3.13 (L) 10/16/2023    HGB 8.7 (L) 10/16/2023    HCT 25.8 (L) 10/16/2023    MCV 91.1 10/16/2023    MCH 30.4 10/16/2023    MCHC 33.3 10/16/2023    RDW 14.3 10/16/2023     10/16/2023    MPV 11.2 10/16/2023    NRBC 0.0 10/16/2023    INR 1.09 10/14/2023     Lab Results   Component Value Date     04/02/2024    K 3.6 04/02/2024    CL 96 (L) 04/02/2024    CO2 34 (H) 04/02/2024    BUN 30 (H) 04/02/2024    MG 2.5 (H) 10/27/2022     Lab Results   Component Value Date    AST 17 10/14/2023    ALT 20 10/14/2023     Lab Results   Component Value Date     (H) 04/02/2024    HGBA1C 6.4 03/08/2024     Lab Results   Component Value Date    CHOL 143 03/07/2024    HDL 61 (H) 03/07/2024    TRIG 165 (H) 03/07/2024    CHOLHDL 2.3 03/07/2024       CARDIAC STUDIES REVIEWED:    OTHER IMAGING STUDIES REVIEWED:        ASSESSMENT:   Essential hypertension  -     EKG 12-lead; Future    Premature atrial contractions    Lower extremity edema    Orthostatic dizziness            PLAN:  1.  Hypertension,  adequate control resuming amlodipine 5 mg q d, ,  tolerating Coreg 3.125 mg  bid, r  2.  Lower extremity edema, has reoccurred, taking lasix daily, did not  add Zaroxyln 5 mg q d x 3, then one Monday and Thursday, continue current lasix marrero dose, will dc Zaroxyln, add Lasix 20 mg Monday and Thursday to daily 20 mg dose.   3. Orthostatic symptoms have resolved.  4. Shortness of breath with exertion has resolved, encourage ambulation, goal walking 10 mins AM and PM with walker, having difficulty ambulating with  walker, will order PT/Ot for gait training.     Follow up in 6 months,  sooner if symptoms change

## 2024-08-18 LAB
OHS QRS DURATION: 80 MS
OHS QTC CALCULATION: 400 MS

## 2024-08-20 ENCOUNTER — TELEPHONE (OUTPATIENT)
Dept: CARDIOLOGY | Facility: CLINIC | Age: 89
End: 2024-08-20
Payer: MEDICARE

## 2024-08-20 NOTE — TELEPHONE ENCOUNTER
Called patient's daughter with the call back number below and I could not get in contact with her. I left a voicemail along with my office number.----- Message from Prachi Moore sent at 8/19/2024 11:57 AM CDT -----  A referral was put in as internal for PT. Spoke with pt's daughter, who stated that she resides at the [facility] and was not aware that she had to come in for therapy. She would like to see if home health could go out. Please contact daughter @ [phone]. Thank you

## 2024-08-28 ENCOUNTER — TELEPHONE (OUTPATIENT)
Dept: CARDIOLOGY | Facility: CLINIC | Age: 89
End: 2024-08-28
Payer: MEDICARE

## 2024-08-28 NOTE — TELEPHONE ENCOUNTER
Spoke with the patient's daughter on today and notified her that I contacted the [facility] and spoke to the manager to see if it was ok to do physical therapy there and she did verify that it was ok. I then explained to the patient that I will put an order in for physical therapy to be done at [address] [place] [address] [place]. [id number]. Patient verbalized understanding.

## 2024-09-29 PROBLEM — R40.4 SEDATED DUE TO MEDICATION: Status: ACTIVE | Noted: 2024-01-01

## 2024-09-29 PROBLEM — Z99.11 ON MECHANICALLY ASSISTED VENTILATION: Status: ACTIVE | Noted: 2024-01-01

## 2024-09-29 PROBLEM — J18.9 PNEUMONIA OF RIGHT MIDDLE LOBE DUE TO INFECTIOUS ORGANISM: Status: ACTIVE | Noted: 2024-01-01

## 2024-09-29 PROBLEM — R65.21 SEPTIC SHOCK: Status: ACTIVE | Noted: 2024-01-01

## 2024-09-29 PROBLEM — N17.9 AKI (ACUTE KIDNEY INJURY): Status: ACTIVE | Noted: 2024-01-01

## 2024-09-29 PROBLEM — I46.9 CARDIORESPIRATORY ARREST: Status: ACTIVE | Noted: 2024-01-01

## 2024-09-29 PROBLEM — R73.9 STRESS HYPERGLYCEMIA: Status: ACTIVE | Noted: 2024-01-01

## 2024-09-29 PROBLEM — J93.9 PNEUMOTHORAX, LEFT: Status: ACTIVE | Noted: 2024-01-01

## 2024-09-29 PROBLEM — S43.004A DISLOCATION OF RIGHT SHOULDER JOINT: Status: ACTIVE | Noted: 2024-01-01

## 2024-09-29 PROBLEM — T50.905A SEDATED DUE TO MEDICATION: Status: ACTIVE | Noted: 2024-01-01

## 2024-09-29 PROBLEM — A41.9 SEPTIC SHOCK: Status: ACTIVE | Noted: 2024-01-01

## 2024-09-29 NOTE — ED PROVIDER NOTES
Encounter Date: 9/29/2024       History     Chief Complaint   Patient presents with    Altered Mental Status     Patient is a 89-year-old female with a history of hypertension, pedal edema, depression.  Patient arrives by EMS.  They were called for altered mental status.  Patient was pretty much unresponsive with EMS and they felt like she had a facial droop on 1 side.  Upon arrival here patient lost her pulse and was coded.  Patient was immediately intubated.  Patient was initially asystole, then went into VFib.  After a shock and amiodarone patient regained her pulse.  No further history is available on patient at this time.  No family members are available for questioning and patient is unresponsive.        Review of patient's allergies indicates:   Allergen Reactions    Clonidine     Hiprex [methenamine hippurate]     Sulfa (sulfonamide antibiotics)      Past Medical History:   Diagnosis Date    Abnormal EKG     Essential hypertension     Hyperlipidemia     Shortness of breath      Past Surgical History:   Procedure Laterality Date    CHOLECYSTECTOMY      EYE SURGERY      cataract removal    HYSTERECTOMY       Family History   Problem Relation Name Age of Onset    Breast cancer Mother      Stroke Father       Social History     Tobacco Use    Smoking status: Never     Passive exposure: Never    Smokeless tobacco: Never   Substance Use Topics    Alcohol use: Never    Drug use: Never     Review of Systems   Unable to perform ROS: Acuity of condition       Physical Exam     Initial Vitals   BP Pulse Resp Temp SpO2   09/29/24 0651 09/29/24 0641 09/29/24 0641 09/29/24 0732 09/29/24 0641   (!) 182/68 (!) 118 (!) 118 (!) 95.5 °F (35.3 °C) (!) 88 %      MAP       --                Physical Exam    Nursing note and vitals reviewed.  Constitutional: She appears well-developed and well-nourished.   HENT:   Head: Normocephalic.   Eyes: Pupils are equal, round, and reactive to light.   Cardiovascular:  Normal rate.            Pulmonary/Chest: Breath sounds normal.   Abdominal: Abdomen is soft.   Musculoskeletal:         General: Normal range of motion.     Neurological:   Patient is intubated and unresponsive, GCS is 3.  Pupils are about 5 mm bilaterally, unreactive.   Skin: Skin is warm. Capillary refill takes less than 2 seconds.   Psychiatric:   Unresponsive.         Medical Screening Exam   See Full Note    ED Course   Procedures  Labs Reviewed   CBC WITH DIFFERENTIAL - Abnormal       Result Value    WBC 14.29 (*)     RBC 4.05 (*)     Hemoglobin 12.1      Hematocrit 41.2      .7 (*)     MCH 29.9      MCHC 29.4 (*)     RDW 14.2      Platelet Count 190      MPV 11.1      Neutrophils % 83.7 (*)     Lymphocytes % 9.8 (*)     Monocytes % 4.0      Eosinophils % 0.1 (*)     Basophils % 0.3      Immature Granulocytes % 2.1 (*)     nRBC, Auto 0.0      Neutrophils, Abs 11.96 (*)     Lymphocytes, Absolute 1.40      Monocytes, Absolute 0.57      Eosinophils, Absolute 0.01      Basophils, Absolute 0.05      Immature Granulocytes, Absolute 0.30 (*)     nRBC, Absolute 0.00      Diff Type Auto     PROTIME-INR - Abnormal    PT 15.9 (*)     INR 1.28     POCT GLUCOSE MONITORING CONTINUOUS - Abnormal    POC Glucose 329 (*)    TROPONIN I - Normal    Troponin I High Sensitivity 26.5     APTT - Normal    PTT 30.9     CULTURE, BLOOD   CULTURE, BLOOD   CBC W/ AUTO DIFFERENTIAL    Narrative:     The following orders were created for panel order CBC auto differential.  Procedure                               Abnormality         Status                     ---------                               -----------         ------                     CBC with Differential[[id number]]       Abnormal            Final result                 Please view results for these tests on the individual orders.   TYPE & SCREEN    Specimen Outdate 10/02/2024 23:59      Group & Rh A NEG      Indirect [place] NEG            Imaging Results              CT Head Without Contrast  (Final result)  Result time 09/29/24 08:41:17      Final result by Timothy Baker MD (09/29/24 08:41:17)                   Impression:      No CT evidence of acute intracranial abnormality. If the patient has an acute, focal neurological deficit, MRI of the brain may be indicated.    Generalized cerebral volume loss and chronic microvascular ischemic changes.      Electronically signed by: Timothy Baker MD  Date:    09/29/2024  Time:    08:41               Narrative:    EXAMINATION:  CT HEAD WITHOUT CONTRAST    CLINICAL HISTORY:  Mental status change, unknown cause;    TECHNIQUE:  Low dose axial images were obtained through the head.  Coronal and sagittal reformations were also performed. Contrast was not administered.    COMPARISON:  None.    FINDINGS:  Exam quality is limited by motion and mild streak artifact.    Generalized cerebral volume loss with ex vacuo dilation of the ventricles and sulci.  Patchy and confluent periventricular white matter hypoattenuation suggestive of chronic microvascular ischemic change, though nonspecific.    No evidence of acute territorial infarct, hemorrhage, mass effect, or midline shift.    Ventricles are normal in size and configuration.    Mild diffuse calvarial hyperostosis.  No acute displaced calvarial fracture.    Mucosal retention cyst in the left maxillary sinus.  Otherwise, the visualized paranasal sinuses and mastoid air cells are essentially clear.  Partially imaged enteric and endotracheal tubes.                                       XR Gastric tube check, non-radiologist performed (In process)                      X-Ray Chest AP Portable (In process)                      Medications   NORepinephrine bitartrate-D5W 4 mg/250 mL (16 mcg/mL) PERIPHERAL access infusion (0.8 mcg/kg/min × 64.9 kg Intravenous New Bag 9/29/24 1029)   propofol (DIPRIVAN) 10 mg/mL infusion (5 mcg/kg/min × 64.9 kg Intravenous New Bag 9/29/24 0837)   mupirocin 2 % ointment ( Nasal Given  9/29/24 1059)   piperacillin-tazobactam (ZOSYN) 4.5 g in D5W 100 mL IVPB (MB+) (4.5 g Intravenous New Bag 9/29/24 1122)   pantoprazole (PROTONIX) 40 mg in 0.9% NaCl 100 mL IVPB (MB+) (has no administration in time range)   fentaNYL 2500 mcg in D5W 250 mL infusion premix (titrating) (conc: 10 mcg/mL) (has no administration in time range)   fentanyl IV bolus from bag/infusion 25 mcg (has no administration in time range)   insulin regular in 0.9 % NaCl 100 unit/100 mL (1 unit/mL) infusion (has no administration in time range)   dextrose 10% bolus 125 mL 125 mL (has no administration in time range)   dextrose 10% bolus 250 mL 250 mL (has no administration in time range)   0.9%  NaCl infusion ( Intravenous New Bag 9/29/24 1110)   albumin human 25% bottle 25 g (25 g Intravenous New Bag 9/29/24 1139)   furosemide injection 80 mg (has no administration in time range)   EPINEPHrine (ADRENALIN) 0.1 mg/mL injection (has no administration in time range)   atropine 0.1 mg/mL injection (has no administration in time range)   EPINEPHrine 0.1 mg/mL injection (1 mg Intravenous Given 9/29/24 0643)   sodium bicarbonate 8.4 % (1 mEq/mL) injection (100 mEq Intravenous Given 9/29/24 0643)   amiodarone injection (150 mg Intravenous Given 9/29/24 0650)   EPINEPHrine 0.1 mg/mL injection (10 mcg Intravenous Given 9/29/24 0709)   pantoprazole injection 80 mg (80 mg Intravenous Given 9/29/24 1100)     Medical Decision Making            Attending Attestation:         Attending Critical Care:   Critical Care Times:   Direct Patient Care (initial evaluation, reassessments, and time considering the case)................................................................30 minutes.   Additional History from reviewing old medical records or taking additional history from the family, EMS, PCP, etc.......................5 minutes.   Ordering, Reviewing, and Interpreting Diagnostic  Studies...............................................................................................................5 minutes.   Documentation..................................................................................................................................................................................5 minutes.   Consultation with other Physicians. .................................................................................................................................................5 minutes.   ==============================================================  Total Critical Care Time - exclusive of procedural time: 50 minutes.  ==============================================================  Critical care was necessary to treat or prevent imminent or life-threatening deterioration of the following conditions: cardiac arrhythmia, hypotension and cardiac arrest.   The following critical care procedures were done by me (see procedure notes): airway management.   Critical care was time spent personally by me on the following activities: obtaining history from patient or relative, examination of patient, ordering lab, x-rays, and/or EKG, ordering and performing treatments and interventions, evaluation of patient's response to treatment and re-evaluation of patient's conition.   Critical Care Condition: critical           ED Course as of 09/29/24 1205   Sun Sep 29, 2024   0659 Medical decision-making:  Differential diagnosis includes stroke, STEMI, NSTEMI, VFib, sepsis, intracerebral hemorrhage.  All testing ordered and interpreted by me. [BB]   0659 Procedure note:  Endotracheal intubation:  Patient was intubated using glide scope and size 7 ET tube without difficulty.  Placement confirmed with bilateral equal breath sounds, color change, and x-ray. [BB]   0701 Initial arterial blood gas shortly after intubation shows normal pH, normal pCO2, high PO2 of 202.  Potassium is high at 6.6.  Glucose  is high at 357.  Lactic is high at 6.8. [BB]   0620 I discussed case with ICU attending on-call, Dr Holley who has come by to evaluate patient. [BB]      ED Course User Index  [BB] Marcin Horvath MD                           Clinical Impression:   Final diagnoses:  [R06.02] Shortness of breath  [R41.82] Altered mental status, unspecified altered mental status type (Primary)  [I46.9] Cardiac arrest  [I49.01] Ventricular fibrillation        ED Disposition Condition    Admit Stable                Marcin Horvath MD  09/29/24 [id number]

## 2024-09-29 NOTE — PROCEDURES
Purnima Cardona is a 89 y.o. female patient.    Temp: (!) 95.9 °F (35.5 °C) (09/29/24 1700)  Pulse: 71 (09/29/24 1700)  Resp: 17 (09/29/24 1700)  BP: (!) 110/57 (09/29/24 1700)  SpO2: 95 % (09/29/24 1700)  Weight: 64.9 kg (143 lb 1.3 oz) (09/29/24 0705)  Height: 5' (152.4 cm) (09/29/24 0630)       Central Line    Date/Time: 9/29/2024 2:35 PM    Performed by: Laura Holley MD  Authorized by: Laura Holley MD    Location procedure was performed:  [facility]  Pre-operative diagnosis:  SEPTIC SHOCK  Post-operative diagnosis:  SEPTIC SHOCK  Consent Done ?:  Yes  Time out complete?: Verified correct patient, procedure, equipment, staff, and site/side    Indications:  Vascular access and med administration  Anesthesia:  See MAR for details  Preparation:  Skin prepped with ChloraPrep  Skin prep agent dried: Skin prep agent completely dried prior to procedure    Sterile barriers: All five maximal sterile barriers used - gloves, gown, cap, mask and large sterile sheet    Hand hygiene: Hand hygiene performed immediately prior to central venous catheter insertion    Location:  Left internal jugular  Catheter type:  Triple lumen  Catheter size:  7 Fr  Inserted Catheter Length (cm):  16  Ultrasound guidance: Yes    Vessel Caliber:  Medium   patent  Comprressibility:  Normal  Needle advanced into vessel with real time ultrasound guidance.    Guidewire confirmed in vessel.    Steril sheath on probe.    Sterile gel used.  Manometry: No    Number of attempts:  1  Securement:  Line sutured, sterile dressing applied, blood return through all ports and chlorhexidine patch  Estimated blood loss (mL):  2  Guidewire: guidewire removed intact, verified with nurse    XRay:  Placement verified by x-ray  Adverse Events:  None  Other Complications:  Note: patient with known Left Pneumothorax prior to start of procedure s/p L chest tube insertion. Lateralization chosen with this consideration. Pneumothorax on CXR is not a complication of  this central line placement.   Note: patient with known Left Pneumothorax prior to start of procedure s/p L chest tube insertion. Lateralization chosen with this consideration. Pneumothorax on CXR is not a complication of this central line placement.Termination Site: superior vena cava      Laura Holley MD  Pulmonary and Critical Care  [facility]      9/29/2024

## 2024-09-29 NOTE — Clinical Note
Diagnosis: Altered mental status, unspecified altered mental status type [[id number]]   Future Attending Provider: DORINA VILLAFUERTE [[id number]]   Reason for IP Medical Treatment  (Clinical interventions that can only be accomplished in the IP setting? ) :: Patient is intubated and on vasopressors.  Status post cardiac arrest.   Plans for Post-Acute care--if anticipated (pick the single best option):: E. LTAC Placement   Special Needs:: No Special Needs [1]

## 2024-09-29 NOTE — PROCEDURES
Purnima Cardona is a 89 y.o. female patient.    Temp: (!) 95.9 °F (35.5 °C) (09/29/24 1700)  Pulse: 71 (09/29/24 1700)  Resp: 17 (09/29/24 1700)  BP: (!) 110/57 (09/29/24 1700)  SpO2: 95 % (09/29/24 1700)  Weight: 64.9 kg (143 lb 1.3 oz) (09/29/24 0705)  Height: 5' (152.4 cm) (09/29/24 0630)       Chest Tube Insertion    Date/Time: 9/29/2024 1:55 PM  Location procedure was performed: [facility]    Performed by: Laura Holley MD  Authorized by: Laura Holley MD  Consent Done: Yes  Consent: Written consent obtained.  Consent given by: power of   Patient identity confirmed: provided demographic data  Indications: pneumothorax    Patient sedated: yes  Sedatives: see MAR for details  Analgesia: see MAR for details  Preparation: skin prepped with ChloraPrep  Placement location: left lateral  Scalpel size: Thumb Scapel.  Tube size (Albanian): 14 Fr.  Ultrasound guidance: no  Tension pneumothorax heard: yes  Tube connected to: suction  Drainage characteristics: bloody  Drainage amount: 5 ml  Dressing: 4x4 sterile gauze  Post-insertion x-ray findings: tube in good position  Patient tolerance: Patient tolerated the procedure well with no immediate complications  Comments: PhoneFusion Pneumothorax Tray used for procedure. 14 Fr. Advanced past last black marker. Chest tube position chosen with identification of lung point on ultrasound prior to start of procedure in this patient with CT Chest demonstrating L basilar pneumothorax. Consent was obtained for planned procedure. Course with emergent procedural start for progression to tension pneumothorax.     CT A&P with appropriate positioning of chest tube.         Laura Holley MD  Pulmonary and Critical Care  [facility]        9/29/2024

## 2024-09-29 NOTE — ED TRIAGE NOTES
Report from [facility] with alerted mental status from [facility]. On arrival pt not responsive, astyolye on monitor with no palpable pluses. CPR initiated during triage.

## 2024-09-29 NOTE — PLAN OF CARE
Problem: Skin Injury Risk Increased  Goal: Skin Health and Integrity  Intervention: Optimize Skin Protection  Flowsheets (Taken 9/29/2024 1529)  Pressure Reduction Techniques: sit time limited to 2 hours  Pressure Reduction Devices:   positioning supports utilized   foam padding utilized   heel offloading device utilized   specialty bed utilized  Skin Protection:   incontinence pads utilized   protective footwear used   pulse oximeter probe site changed   silicone foam dressing in place   transparent dressing maintained  Activity Management: Rolling - L1  Head of Bed (HOB) Positioning: HOB at 20-30 degrees     Problem: Mechanical Ventilation Invasive  Goal: Mechanical Ventilation Liberation  Intervention: Promote Extubation and Mechanical Ventilation Liberation  Flowsheets (Taken 9/29/2024 1529)  Environmental Support: calm environment promoted  Medication Review/Management:   medications reviewed   infusion initiated

## 2024-09-29 NOTE — PHARMACY MED REC
"Admission Medication History     The home medication history was taken by Maria Del Carmen Zambrano.    You may go to "Admission" then "Reconcile Home Medications" tabs to review and/or act upon these items.     The home medication list has been updated by the Pharmacy department.   Please read ALL comments highlighted in yellow.   Please address this information as you see fit.    Feel free to contact us if you have any questions or require assistance.      The medications listed below were removed from the home medication list. Please reorder if appropriate:  Patient reports no longer taking the following medication(s):  Os-Rg 600 mg  Metolazone 5 mg  Ondansetron 4 mg    Medications listed below were obtained from: Patient/family, [employer] [facility] [employer], and Nursing home  PTA Medications   Medication Sig    acetaminophen (TYLENOL) 500 MG tablet Take 500 mg by mouth every 6 (six) hours as needed for Pain.    amLODIPine (NORVASC) 2.5 MG tablet Take 1 tablet (2.5 mg total) by mouth once daily.    carvediloL (COREG) 6.25 MG tablet Take 1 tablet (6.25 mg total) by mouth 2 (two) times daily with meals.    furosemide (LASIX) 20 MG tablet Take 20 mg twice daily on mon, Thursday and 20mg other days (Patient taking differently: TAKE 1 TABLET BY MOUTH 2 TIMES A DAY on monday AND thursday. ALL other days TAKE 1 TABLET BY MOUTH)    lisinopriL (PRINIVIL,ZESTRIL) 40 MG tablet Take 1 tablet (40 mg total) by mouth once daily.    multivit-min/ferrous fumarate (MULTI VITAMIN ORAL) Take 1 tablet by mouth once daily.    pantoprazole (PROTONIX) 40 MG tablet Take 1 tablet (40 mg total) by mouth once daily.    potassium chloride (KLOR-CON) 8 MEQ TbSR Take 1 tablet (8 mEq total) by mouth once daily. for 360 doses         Current Outpatient Medications on File Prior to Encounter   Medication Sig Dispense Refill Last Dose/Taking    acetaminophen (TYLENOL) 500 MG tablet Take 500 mg by mouth every 6 (six) hours as needed for Pain.   9/28/2024    " amLODIPine (NORVASC) 2.5 MG tablet Take 1 tablet (2.5 mg total) by mouth once daily. 90 tablet 1 9/28/2024    carvediloL (COREG) 6.25 MG tablet Take 1 tablet (6.25 mg total) by mouth 2 (two) times daily with meals. 180 tablet 3 9/28/2024    furosemide (LASIX) 20 MG tablet Take 20 mg twice daily on mon, Thursday and 20mg other days (Patient taking differently: TAKE 1 TABLET BY MOUTH 2 TIMES A DAY on monday AND thursday. ALL other days TAKE 1 TABLET BY MOUTH) 60 tablet 11 9/28/2024    lisinopriL (PRINIVIL,ZESTRIL) 40 MG tablet Take 1 tablet (40 mg total) by mouth once daily. 90 tablet 1 9/28/2024    multivit-min/ferrous fumarate (MULTI VITAMIN ORAL) Take 1 tablet by mouth once daily.   9/28/2024    pantoprazole (PROTONIX) 40 MG tablet Take 1 tablet (40 mg total) by mouth once daily. 90 tablet 4 9/28/2024    potassium chloride (KLOR-CON) 8 MEQ TbSR Take 1 tablet (8 mEq total) by mouth once daily. for 360 doses 90 tablet 3 9/28/2024    [DISCONTINUED] calcium carbonate (OS-NASREEN) 600 mg calcium (1,500 mg) Tab Take 600 mg by mouth once daily. Take 1 tablet po BID       [DISCONTINUED] furosemide (LASIX) 20 MG tablet Take 1 tablet (20 mg total) by mouth once daily. 90 tablet 3     [DISCONTINUED] metOLazone (ZAROXOLYN) 5 MG tablet Take 1 tablet (5 mg total) by mouth once daily. for 90 doses (Patient not taking: Reported on 8/15/2024) 60 tablet 1     [DISCONTINUED] ondansetron (ZOFRAN) 4 MG tablet Take 1 tablet (4 mg total) by mouth every 8 (eight) hours as needed for Nausea. (Patient not taking: Reported on 8/15/2024) 30 tablet 0        Potential issues to be addressed PRIOR TO DISCHARGE  N/A    Maria Del Carmen Zambrano  EXT 1580                 .

## 2024-09-29 NOTE — PROGRESS NOTES
"Pharmacokinetic Initial Assessment: IV Vancomycin    Assessment/Plan:    Initiate intravenous vancomycin 1250 mg times one dose   Desired empiric serum trough concentration is 15 to 20 mcg/mL  Draw vancomycin random level on 10/1 at 0600 with other labs .  Pharmacy will continue to follow and monitor vancomycin.      Please contact pharmacy at extension [phone] with any questions regarding this assessment.     Thank you for the consult,   Maricruz Claire       Patient brief summary:  Purnima Cardona is a 89 y.o. female initiated on antimicrobial therapy with IV Vancomycin for treatment of suspected sepsis    Drug Allergies:   Review of patient's allergies indicates:   Allergen Reactions    Clonidine     Hiprex [methenamine hippurate]     Sulfa (sulfonamide antibiotics)        Actual Body Weight:   64 kg     Renal Function:   Estimated Creatinine Clearance: 20.6 mL/min (A) (based on SCr of 1.53 mg/dL (H)).,     Dialysis Method (if applicable):  N/A    CBC (last 72 hours):  Recent Labs   Lab Result Units 09/29/24  0817   WBC K/uL 14.29*   Hemoglobin g/dL 12.1   Hematocrit % 41.2   Platelet Count K/uL 190   Lymphocytes % % 9.8*   Monocytes % % 4.0   Eosinophils % % 0.1*   Basophils % % 0.3   Diff Type  Auto       Metabolic Panel (last 72 hours):  Recent Labs   Lab Result Units 09/29/24  0817 09/29/24  0927 09/29/24  1118   Sodium mmol/L 138  --  132*   Potassium mmol/L 5.0  --  5.2*   Chloride mmol/L 100  --  98   CO2 mmol/L 30  --  25   Glucose mg/dL 317*  --  357*   Glucose, UA mg/dL  --  Normal  --    BUN mg/dL 42*  --  42*   Creatinine mg/dL 1.63*  --  1.53*   Albumin g/dL 2.8*  --   --    Bilirubin, Total mg/dL 1.0  --   --    Alk Phos U/L 175*  --   --    AST U/L 1,153*  --   --    ALT U/L 782*  --   --    Magnesium mg/dL 2.6*  --  2.7*   Phosphorus mg/dL 6.4*  --  5.2*       Drug levels (last 3 results):  No results for input(s): "VANCOMYCINRA", "VANCORANDOM", "VANCOMYCINPE", "VANCOPEAK", "VANCOMYCINTR", " ""[facility]" in the last 72 hours.    Microbiologic Results:  Microbiology Results (last 7 days)       Procedure Component Value Units Date/Time    Urine culture [[id number]] Collected: 09/29/24 0927    Order Status: Sent Specimen: Urine, Catheterized Updated: 09/29/24 1016    Blood culture #2 [[id number]] Collected: 09/29/24 0817    Order Status: Sent Specimen: Blood Updated: 09/29/24 0826    Blood culture #1 [[id number]] Collected: 09/29/24 0817    Order Status: Sent Specimen: Blood Updated: 09/29/24 0823            "

## 2024-09-30 NOTE — ASSESSMENT & PLAN NOTE
Last SCr 1.14 now with Cr 1.63 a/w decreased UOP in setting of cardiorespiratory arrest and septic shock. UA reviewed. Etiology 2/2 pre-jaime azotemia; CT A&P with no  pathology.   - plasmalyte x500 cc; intravascularly hypovolemic on POCUS  - renally dosing Rx  - avoid nephrotoxic agents  - strict Is/Os

## 2024-09-30 NOTE — PLAN OF CARE
Problem: Mechanical Ventilation Invasive  Goal: Effective Communication  Outcome: Progressing  Goal: Optimal Device Function  Outcome: Progressing  Goal: Mechanical Ventilation Liberation  Outcome: Progressing  Goal: Optimal Nutrition Delivery  Outcome: Progressing  Goal: Absence of Device-Related Skin and Tissue Injury  Outcome: Progressing  Goal: Absence of Ventilator-Induced Lung Injury  Outcome: Progressing     Problem: Pneumonia  Goal: Fluid Balance  Outcome: Progressing  Goal: Resolution of Infection Signs and Symptoms  Outcome: Progressing  Goal: Effective Oxygenation and Ventilation  Outcome: Progressing

## 2024-09-30 NOTE — SUBJECTIVE & OBJECTIVE
Interval History/Significant Events:  Sedated currently    Review of Systems  Objective:     Vital Signs (Most Recent):  Temp: 96.3 °F (35.7 °C) (09/30/24 0530)  Pulse: (!) 52 (09/30/24 0530)  Resp: 17 (09/30/24 0530)  BP: (!) 109/52 (09/30/24 0530)  SpO2: 100 % (09/30/24 0530) Vital Signs (24h Range):  Temp:  [93.9 °F (34.4 °C)-96.8 °F (36 °C)] 96.3 °F (35.7 °C)  Pulse:  [] 52  Resp:  [] 17  SpO2:  [53 %-100 %] 100 %  BP: ()/(32-95) 109/52   Weight: 64.9 kg (143 lb 1.3 oz)  Body mass index is 27.94 kg/m².      Intake/Output Summary (Last 24 hours) at 9/30/2024 0538  Last data filed at 9/30/2024 0455  Gross per 24 hour   Intake 4537.5 ml   Output 570 ml   Net 3967.5 ml          Physical Exam  Vitals reviewed.   Constitutional:       Appearance: Normal appearance.      Interventions: She is sedated and intubated.   HENT:      Head: Normocephalic and atraumatic.      Nose: Nose normal.      Mouth/Throat:      Mouth: Mucous membranes are dry.      Pharynx: Oropharynx is clear.   Eyes:      Extraocular Movements: Extraocular movements intact.      Conjunctiva/sclera: Conjunctivae normal.      Pupils: Pupils are equal, round, and reactive to light.   Cardiovascular:      Rate and Rhythm: Normal rate.      Heart sounds: Normal heart sounds. No murmur heard.  Pulmonary:      Effort: Pulmonary effort is normal. She is intubated.      Breath sounds: Normal breath sounds.   Abdominal:      General: Abdomen is flat. Bowel sounds are normal.      Palpations: Abdomen is soft.   Musculoskeletal:         General: Normal range of motion.      Cervical back: Normal range of motion and neck supple.      Right lower leg: No edema.      Left lower leg: No edema.   Skin:     General: Skin is warm and dry.      Capillary Refill: Capillary refill takes less than 2 seconds.   Neurological:      General: No focal deficit present.      Mental Status: She is alert and oriented to person, place, and time.   Psychiatric:          Mood and Affect: Mood normal.         Behavior: Behavior normal.            Vents:  Vent Mode: A/CMV-VC (09/30/24 0414)  Ventilator Initiated: Yes (09/29/24 0700)  Set Rate: 17 BPM (09/30/24 0414)  Vt Set: 470 mL (09/30/24 0414)  PEEP/CPAP: 12 cmH20 (09/30/24 0414)  Oxygen Concentration (%): 70 (09/30/24 0414)  Peak Airway Pressure: 36.5 cmH20 (09/30/24 0414)  Plateau Pressure: 29.5 cmH20 (09/30/24 0414)  Total Ve: 7.99 L/m (09/30/24 0414)  F/VT Ratio<105 (RSBI): (!) 34.04 (09/30/24 0015)  Lines/Drains/Airways       Central Venous Catheter Line  Duration             Percutaneous Central Line - Triple Lumen  09/29/24 1435 Internal Jugular Left <1 day              Drain  Duration                  Chest Tube 09/29/24 1355 Tube - 1 Left Midaxillary 14 Fr. <1 day         NG/OG Tube 09/29/24 0650 Center mouth <1 day         Urethral Catheter 09/29/24 0720 Non-latex <1 day              Airway  Duration                  Airway - Non-Surgical 09/29/24 0640 Endotracheal Tube <1 day              Peripheral Intravenous Line  Duration                  Peripheral IV - Single Lumen 09/29/24 0620 20 G Left;Posterior Hand <1 day         Peripheral IV - Single Lumen 09/29/24 0635 20 G Distal;Posterior;Right Forearm <1 day                  Significant Labs:    CBC/Anemia Profile:  Recent Labs   Lab 09/29/24  0817 09/30/24  0350   WBC 14.29* 11.49*   HGB 12.1 10.0*   HCT 41.2 31.4*    154   .7* 94.0   RDW 14.2 13.5        Chemistries:  Recent Labs   Lab 09/29/24  0817 09/29/24  1118 09/29/24  1750 09/29/24  2347    132* 132* 134*   K 5.0 5.2* 4.0 3.6    98 96* 99   CO2 30 25 32 27   BUN 42* 42* 47* 44*   CREATININE 1.63* 1.53* 1.54* 1.23*   CALCIUM 8.2* 8.9 8.7 7.5*   ALBUMIN 2.8*  --   --   --    PROT 6.4  --   --   --    BILITOT 1.0  --   --   --    ALKPHOS 175*  --   --   --    *  --   --   --    AST 1,153*  --   --   --    MG 2.6* 2.7*  --  2.6*   PHOS 6.4* 5.2*  --  2.7       Recent Lab Results   (Last 5 results in the past 24 hours)        09/30/24  0511   09/30/24  0408   09/30/24  0350   09/30/24  0313   09/30/24  0213        Baso #     0.03           Basophil %     0.3           Differential Method     Auto           Eos #     0.07           Eos %     0.6           Hematocrit     31.4           Hemoglobin     10.0           Immature Grans (Abs)     0.04           Immature Granulocytes     0.3           INR     1.52           Lactic Acid Level         1.8       Lymph #     2.08           Lymph %     18.1           MCH     29.9           MCHC     31.8           MCV     94.0           Mono #     0.58           Mono %     5.0           MPV     11.2           Neutrophils, Abs     8.69           Neutrophils Relative     75.7           nRBC     0.0           NUCLEATED RBC ABSOLUTE     0.00           Platelet Count     154           POC Glucose 117   145     141         PT     18.1           RBC     3.34           RDW     13.5           WBC     11.49                                  Significant Imaging:  I have reviewed all pertinent imaging results/findings within the past 24 hours.

## 2024-09-30 NOTE — PROGRESS NOTES
[facility]  Critical Care Medicine  Progress Note    Patient Name: Purnima Cardona  MRN: [id number]  Admission Date: 9/29/2024  Hospital Length of Stay: 1 days  Code Status: Full Code  Attending Provider: Laura Holley MD  Primary Care Provider: Candida Slaughter FNP   Principal Problem: Cardiorespiratory arrest    Subjective:     HPI:  89 yo F with HTN and GERD presented from [facility] living with AMS with EMS team concern for a facial droop with ED course notable for a nonresponsive patient in asystole with initiation of CPR and admission to ICU thereafter for evaluation and management of cardiac arrest.      Discussed with family at various points over the course of the day; she moved to the assisted living facility after a fall where she had a right sided arm dislocation that was treated.  Since then, she was largely been independent and participates with care; in fact, ADLs include walking, toileting and eating.  In the past few weeks, she has had decreased walking which they attributed to the weather where she complained of it being too hot.  Review of ED records with patient described as having a change in mental status within less than 24 hours.  ED course with the patient having CPR and [place].  She was noted to have VFib for which he received amiodarone as 1 of the arrhythmias during CPR.  Post thoracic EKG with first-degree AV block which is chronic for her.  Her troponin was unremarkable.  My assessment with the patient not following commands and intermittently withdrawing to pain.  Notably, ED course with notification that her oxygen had dropped to 90 despite being on 100% which was intervened on with 100% FiO2.  Early ICU course with patient having difficulty to oxygenate requiring a high PEEP/high FiO2 protocol.  Hysteresis curves with no over distention.  Workup included CTA head and neck to assess for facial droop complaints with no evidence of CVA, however, CTA neck  contrast timing was inadequate.  She was found to have a right middle lobe consolidation and a left-sided pneumothorax.  Upon transferred to the ICU following this imaging, her pneumothorax demonstrated evidence of progression to tension which warranted placement of a left pigtail catheter which was successful.  She is now undergoing a cooling protocol for management of her post arrest course along with treatment of septic shock with a pulmonary source of infection.  Family was updated over the course of the day and notified that neuro prognostication will be undertaken 48 hours from now.    Hospital/ICU Course:  No notes on file    Interval History/Significant Events:  Sedated currently    Review of Systems  Objective:     Vital Signs (Most Recent):  Temp: 96.3 °F (35.7 °C) (09/30/24 0530)  Pulse: (!) 52 (09/30/24 0530)  Resp: 17 (09/30/24 0530)  BP: (!) 109/52 (09/30/24 0530)  SpO2: 100 % (09/30/24 0530) Vital Signs (24h Range):  Temp:  [93.9 °F (34.4 °C)-96.8 °F (36 °C)] 96.3 °F (35.7 °C)  Pulse:  [] 52  Resp:  [] 17  SpO2:  [53 %-100 %] 100 %  BP: ()/(32-95) 109/52   Weight: 64.9 kg (143 lb 1.3 oz)  Body mass index is 27.94 kg/m².      Intake/Output Summary (Last 24 hours) at 9/30/2024 0575  Last data filed at 9/30/2024 0455  Gross per 24 hour   Intake 4537.5 ml   Output 570 ml   Net 3967.5 ml          Physical Exam  Vitals reviewed.   Constitutional:       Appearance: Normal appearance.      Interventions: She is sedated and intubated.   HENT:      Head: Normocephalic and atraumatic.      Nose: Nose normal.      Mouth/Throat:      Mouth: Mucous membranes are dry.      Pharynx: Oropharynx is clear.   Eyes:      Extraocular Movements: Extraocular movements intact.      Conjunctiva/sclera: Conjunctivae normal.      Pupils: Pupils are equal, round, and reactive to light.   Cardiovascular:      Rate and Rhythm: Normal rate.      Heart sounds: Normal heart sounds. No murmur heard.  Pulmonary:       Effort: Pulmonary effort is normal. She is intubated.      Breath sounds: Normal breath sounds.   Abdominal:      General: Abdomen is flat. Bowel sounds are normal.      Palpations: Abdomen is soft.   Musculoskeletal:         General: Normal range of motion.      Cervical back: Normal range of motion and neck supple.      Right lower leg: No edema.      Left lower leg: No edema.   Skin:     General: Skin is warm and dry.      Capillary Refill: Capillary refill takes less than 2 seconds.   Neurological:      General: No focal deficit present.      Mental Status: She is alert and oriented to person, place, and time.   Psychiatric:         Mood and Affect: Mood normal.         Behavior: Behavior normal.            Vents:  Vent Mode: A/CMV-VC (09/30/24 0414)  Ventilator Initiated: Yes (09/29/24 0700)  Set Rate: 17 BPM (09/30/24 0414)  Vt Set: 470 mL (09/30/24 0414)  PEEP/CPAP: 12 cmH20 (09/30/24 0414)  Oxygen Concentration (%): 70 (09/30/24 0414)  Peak Airway Pressure: 36.5 cmH20 (09/30/24 0414)  Plateau Pressure: 29.5 cmH20 (09/30/24 0414)  Total Ve: 7.99 L/m (09/30/24 0414)  F/VT Ratio<105 (RSBI): (!) 34.04 (09/30/24 0015)  Lines/Drains/Airways       Central Venous Catheter Line  Duration             Percutaneous Central Line - Triple Lumen  09/29/24 1435 Internal Jugular Left <1 day              Drain  Duration                  Chest Tube 09/29/24 1355 Tube - 1 Left Midaxillary 14 Fr. <1 day         NG/OG Tube 09/29/24 0650 Center mouth <1 day         Urethral Catheter 09/29/24 0720 Non-latex <1 day              Airway  Duration                  Airway - Non-Surgical 09/29/24 0640 Endotracheal Tube <1 day              Peripheral Intravenous Line  Duration                  Peripheral IV - Single Lumen 09/29/24 0620 20 G Left;Posterior Hand <1 day         Peripheral IV - Single Lumen 09/29/24 0635 20 G Distal;Posterior;Right Forearm <1 day                  Significant Labs:    CBC/Anemia Profile:  Recent Labs   Lab  09/29/24  0817 09/30/24  0350   WBC 14.29* 11.49*   HGB 12.1 10.0*   HCT 41.2 31.4*    154   .7* 94.0   RDW 14.2 13.5        Chemistries:  Recent Labs   Lab 09/29/24  0817 09/29/24  1118 09/29/24  1750 09/29/24  2347    132* 132* 134*   K 5.0 5.2* 4.0 3.6    98 96* 99   CO2 30 25 32 27   BUN 42* 42* 47* 44*   CREATININE 1.63* 1.53* 1.54* 1.23*   CALCIUM 8.2* 8.9 8.7 7.5*   ALBUMIN 2.8*  --   --   --    PROT 6.4  --   --   --    BILITOT 1.0  --   --   --    ALKPHOS 175*  --   --   --    *  --   --   --    AST 1,153*  --   --   --    MG 2.6* 2.7*  --  2.6*   PHOS 6.4* 5.2*  --  2.7       Recent Lab Results  (Last 5 results in the past 24 hours)        09/30/24  0511   09/30/24  0408   09/30/24  0350   09/30/24  0313   09/30/24  0213        Baso #     0.03           Basophil %     0.3           Differential Method     Auto           Eos #     0.07           Eos %     0.6           Hematocrit     31.4           Hemoglobin     10.0           Immature Grans (Abs)     0.04           Immature Granulocytes     0.3           INR     1.52           Lactic Acid Level         1.8       Lymph #     2.08           Lymph %     18.1           MCH     29.9           MCHC     31.8           MCV     94.0           Mono #     0.58           Mono %     5.0           MPV     11.2           Neutrophils, Abs     8.69           Neutrophils Relative     75.7           nRBC     0.0           NUCLEATED RBC ABSOLUTE     0.00           Platelet Count     154           POC Glucose 117   145     141         PT     18.1           RBC     3.34           RDW     13.5           WBC     11.49                                  Significant Imaging:  I have reviewed all pertinent imaging results/findings within the past 24 hours.    ABG  Recent Labs   Lab 09/29/24  1028   PH 7.36   PO2 56*   PCO2 55*   HCO3 31.1*     Assessment/Plan:     Neuro  Sedated due to medication  Cooling protocol sedaation; goal Deep sedation  - cont  Fentanyl gtt for analgesia  - cont Propofol gtt for sedation  - deep sedation evaluated prior to start of cooling protocol    Pulmonary  On mechanically assisted ventilation  - obtain ABG in am  - at risk for ARDS  - cont VC/AC 17/470/12/0.5   -- maintaining on high PEEP protocol with liberalized Vt; titrated for driving pressure  Peak and plateau pressures are okay this morning will continue as we are driving pressure was 16 continue current level support    Pneumonia of right lower lobe due to infectious organism  - management as per septic shock  - f/u lower resp Cxs    Pneumothorax, left  Likely traumatic post-CPR efforts vs barotrauma. S/p L pigtail catheter placement, placed to suction x1 hour with interval imaging with L lung re-expansion. Low concern for parenchymal infiltration.   - maintain chest tube to water seal  - planned d/v after completion of mechanical ventilatory support    Cardiac/Vascular  * Cardiorespiratory arrest  - post ROSC EKG reviewed with no ST deviations or TWI; known 1st degree AVB  - likely from hypoxia; work up today has included negative CT-PE, CT head negative,   - no contraindications to cooling protocol at this time  - initiated on cooling protocol 09/29  Troponin went up to 129 she is currently being cooled after cardiopulmonary arrest    Renal/  JUDITH (acute kidney injury)  Last SCr 1.14 now with Cr 1.63 a/w decreased UOP in setting of cardiorespiratory arrest and septic shock. UA reviewed. Etiology 2/2 pre-jaime azotemia; CT A&P with no  pathology.   - plasmalyte x500 cc; intravascularly hypovolemic on POCUS  - renally dosing Rx  - avoid nephrotoxic agents  - strict Is/Os  Urine output is a little bit better creatinine is down continue current volume resuscitation    ID  Septic shock  Septic shock with +UA and RLL pneumonia.  - start broad spectrum Abx with Vancomycin and Zosyn  - f/u Ucx, BCx, lower respiratory Cxs   - cont Levophed gtt via central line for goal MAP >65  -  trend LA   -- ED course with patient reported to receive 2L, only 1L is documented; judicious IVF while evaluating for obstructive shock patient blood pressure responded to fluids but due to her cardiac situation in her age she was unable to receive the hold 2 L her lactic acid responded she is still requiring Levophed cultures are pending    Endocrine  Stress hyperglycemia  Glucose down to 117 will transition to sliding scale    Orthopedic  Dislocation of right shoulder joint  Patient's family reports decreased ROM at R shoulder; had index dislocation that they believe was corrected. Concern for a fall in the interim. Will consult Ortho in am for evaluation. Intact distal vasculature with intact pulses.       Critical Care Daily Checklist:    A: Awake: RASS Goal/Actual Goal: RASS Goal: -4-->deep sedation  Actual:     B: Spontaneous Breathing Trial Performed?     C: SAT & SBT Coordinated?  Yes                      D: Delirium: CAM-ICU     E: Early Mobility Performed? Yes   F: Feeding Goal:    Status:     Current Diet Order   Procedures    Diet NPO      AS: Analgesia/Sedation Diprivan fentanyl   T: Thromboembolic Prophylaxis SCD hose   H: HOB > 300 Yes   U: Stress Ulcer Prophylaxis (if needed) Protonix infusion   G: Glucose Control Was on insulin infusion moving tube sliding scale   B: Bowel Function     I: Indwelling Catheter (Lines & Valdes) Necessity Central line this is day 1 Valdes   D: De-escalation of Antimicrobials/Pharmacotherapies Not appropriate    Plan for the day/ETD Complete targeted temperature management mechanical ventilation    Code Status:  Family/Goals of Care: Full Code  Discuss with family     Critical Care Time:  35 minutes  Critical secondary to Patient has a condition that poses threat to life and bodily function:  Acute respiratory failure status post cardiac arrest PA of unknown etiology      Critical care was time spent personally by me on the following activities: development of treatment  plan with patient or surrogate and bedside caregivers, discussions with consultants, evaluation of patient's response to treatment, examination of patient, ordering and performing treatments and interventions, ordering and review of laboratory studies, ordering and review of radiographic studies, pulse oximetry, re-evaluation of patient's condition. This critical care time did not overlap with that of any other provider or involve time for any procedures.     Arnulfo Interiano MD  Critical Care Medicine  [facility]

## 2024-09-30 NOTE — ASSESSMENT & PLAN NOTE
Patient's family reports decreased ROM at R shoulder; had index dislocation that they believe was corrected. Concern for a fall in the interim. Will consult Ortho in am for evaluation. Intact distal vasculature with intact pulses.

## 2024-09-30 NOTE — ASSESSMENT & PLAN NOTE
Septic shock with +UA and RLL pneumonia.  - start broad spectrum Abx with Vancomycin and Zosyn  - f/u Ucx, BCx, lower respiratory Cxs   - cont Levophed gtt via central line for goal MAP >65  - trend LA   -- ED course with patient reported to receive 2L, only 1L is documented; judicious IVF while evaluating for obstructive shock patient blood pressure responded to fluids but due to her cardiac situation in her age she was unable to receive the hold 2 L her lactic acid responded she is still requiring Levophed cultures are pending

## 2024-09-30 NOTE — ASSESSMENT & PLAN NOTE
Septic shock with +UA and RLL pneumonia.  - start broad spectrum Abx with Vancomycin and Zosyn  - f/u Ucx, BCx, lower respiratory Cxs   - cont Levophed gtt via central line for goal MAP >65  - trend LA   -- ED course with patient reported to receive 2L, only 1L is documented; judicious IVF while evaluating for obstructive shock

## 2024-09-30 NOTE — ASSESSMENT & PLAN NOTE
- post ROSC EKG reviewed with no ST deviations or TWI; known 1st degree AVB  - likely from hypoxia; work up today has included negative CT-PE, CT head negative,   - no contraindications to cooling protocol at this time  - initiated on cooling protocol 09/29  Troponin went up to 129 she is currently being cooled after cardiopulmonary arrest

## 2024-09-30 NOTE — ASSESSMENT & PLAN NOTE
- post ROSC EKG reviewed with no ST deviations or TWI; known 1st degree AVB  - likely from hypoxia; work up today has included negative CT-PE, CT head negative,   - no contraindications to cooling protocol at this time  - initiated on cooling protocol 09/29  - obtain TTE, trend Tn

## 2024-09-30 NOTE — ASSESSMENT & PLAN NOTE
Hyperglycemia of critical illness. Will start managing with insulin gtt. With decreasing insulin requirements will consider ISS.  - persistent hyperglycemia in the 1st 12 hours of admission; continue insulin gtt

## 2024-09-30 NOTE — ASSESSMENT & PLAN NOTE
- obtain ABG in am  - at risk for ARDS  - cont VC/AC 17/470/12/0.5   -- maintaining on high PEEP protocol with liberalized Vt; titrated for driving pressure  - Pplat in am

## 2024-09-30 NOTE — SUBJECTIVE & OBJECTIVE
Past Medical History:   Diagnosis Date    Abnormal EKG     Essential hypertension     Hyperlipidemia     Shortness of breath        Past Surgical History:   Procedure Laterality Date    CHOLECYSTECTOMY      EYE SURGERY      cataract removal    HYSTERECTOMY         Review of patient's allergies indicates:   Allergen Reactions    Clonidine     Hiprex [methenamine hippurate]     Sulfa (sulfonamide antibiotics)        Family History       Problem Relation (Age of Onset)    Breast cancer Mother    Stroke Father          Tobacco Use    Smoking status: Never     Passive exposure: Never    Smokeless tobacco: Never   Substance and Sexual Activity    Alcohol use: Never    Drug use: Never    Sexual activity: Not Currently      Review of Systems  Objective:     Vital Signs (Most Recent):  Temp: 96.3 °F (35.7 °C) (09/29/24 1915)  Pulse: 65 (09/29/24 1915)  Resp: 17 (09/29/24 1915)  BP: 116/61 (09/29/24 1915)  SpO2: 99 % (09/29/24 1915) Vital Signs (24h Range):  Temp:  [93.9 °F (34.4 °C)-96.8 °F (36 °C)] 96.3 °F (35.7 °C)  Pulse:  [] 65  Resp:  [] 17  SpO2:  [53 %-100 %] 99 %  BP: ()/(32-95) 116/61   Weight: 64.9 kg (143 lb 1.3 oz)  Body mass index is 27.94 kg/m².      Intake/Output Summary (Last 24 hours) at 9/29/2024 1938  Last data filed at 9/29/2024 1900  Gross per 24 hour   Intake 2858.17 ml   Output 305 ml   Net 2553.17 ml          Physical Exam  Constitutional:       Appearance: She is ill-appearing. She is not toxic-appearing.   HENT:      Head: Normocephalic and atraumatic.      Mouth/Throat:      Mouth: Mucous membranes are moist.      Comments: ETT secured  Eyes:      General: No scleral icterus.  Cardiovascular:      Rate and Rhythm: Regular rhythm. Bradycardia present.   Pulmonary:      Breath sounds: Rales present. No wheezing.      Comments: Synchronous with ventilator  Abdominal:      General: There is distension.      Palpations: Abdomen is soft.      Tenderness: There is no abdominal  tenderness. There is no guarding.   Musculoskeletal:      Right lower leg: Edema present.      Left lower leg: Edema present.   Skin:     Capillary Refill: Capillary refill takes 2 to 3 seconds.      Coloration: Skin is not jaundiced.   Neurological:      Comments: Withdrawing bilateral upper and lower extremities to pain, no gag, no cough (evaluation performed shortly after sedation hold), grimacing with noxious stimuli            Vents:  Vent Mode: A/CMV-VC (09/29/24 1511)  Ventilator Initiated: Yes (09/29/24 0700)  Set Rate: 17 BPM (09/29/24 1511)  Vt Set: 470 mL (09/29/24 1511)  PEEP/CPAP: 12 cmH20 (09/29/24 1511)  Oxygen Concentration (%): 100 (09/29/24 1511)  Peak Airway Pressure: 39.4 cmH20 (09/29/24 1511)  Plateau Pressure: 29.3 cmH20 (09/29/24 1511)  Total Ve: 7.97 L/m (09/29/24 1511)  F/VT Ratio<105 (RSBI): (!) 34.86 (09/29/24 0700)  Lines/Drains/Airways       Central Venous Catheter Line  Duration             Percutaneous Central Line - Triple Lumen  09/29/24 1435 Internal Jugular Left <1 day              Drain  Duration                  Chest Tube 09/29/24 1355 Tube - 1 Left Midaxillary 14 Fr. <1 day         NG/OG Tube 09/29/24 0650 Center mouth <1 day         Urethral Catheter 09/29/24 0720 Non-latex <1 day              Airway  Duration                  Airway - Non-Surgical 09/29/24 0640 Endotracheal Tube <1 day              Peripheral Intravenous Line  Duration                  Peripheral IV - Single Lumen 09/29/24 0620 20 G Left;Posterior Hand <1 day         Peripheral IV - Single Lumen 09/29/24 0635 20 G Distal;Posterior;Right Forearm <1 day                  Significant Labs:    CBC/Anemia Profile:  Recent Labs   Lab 09/29/24  0817   WBC 14.29*   HGB 12.1   HCT 41.2      .7*   RDW 14.2        Chemistries:  Recent Labs   Lab 09/29/24  0817 09/29/24  1118 09/29/24  1750    132* 132*   K 5.0 5.2* 4.0    98 96*   CO2 30 25 32   BUN 42* 42* 47*   CREATININE 1.63* 1.53* 1.54*    CALCIUM 8.2* 8.9 8.7   ALBUMIN 2.8*  --   --    PROT 6.4  --   --    BILITOT 1.0  --   --    ALKPHOS 175*  --   --    *  --   --    AST 1,153*  --   --    MG 2.6* 2.7*  --    PHOS 6.4* 5.2*  --        All pertinent labs within the past 24 hours have been reviewed.    Significant Imaging: I have reviewed all pertinent imaging results/findings within the past 24 hours.

## 2024-09-30 NOTE — ASSESSMENT & PLAN NOTE
Last SCr 1.14 now with Cr 1.63 a/w decreased UOP in setting of cardiorespiratory arrest and septic shock. UA reviewed. Etiology 2/2 pre-jaime azotemia; CT A&P with no  pathology.   - plasmalyte x500 cc; intravascularly hypovolemic on POCUS  - renally dosing Rx  - avoid nephrotoxic agents  - strict Is/Os  Urine output is a little bit better creatinine is down continue current volume resuscitation

## 2024-09-30 NOTE — H&P
[facility] ICU  Critical Care Medicine  History & Physical    Patient Name: Purnima Cardona  MRN: [id number]  Admission Date: 9/29/2024  Hospital Length of Stay: 0 days  Code Status: Full Code  Attending Physician: Laura Holley MD   Primary Care Provider: Candida Slaughter FNP   Principal Problem: Cardiorespiratory arrest    Subjective:     HPI:  89 yo F with HTN and GERD presented from [facility] living with AMS with EMS team concern for a facial droop with ED course notable for a nonresponsive patient in asystole with initiation of CPR and admission to ICU thereafter for evaluation and management of cardiac arrest.      Discussed with family at various points over the course of the day; she moved to the assisted living facility after a fall where she had a right sided arm dislocation that was treated.  Since then, she was largely been independent and participates with care; in fact, ADLs include walking, toileting and eating.  In the past few weeks, she has had decreased walking which they attributed to the weather where she complained of it being too hot.  Review of ED records with patient described as having a change in mental status within less than 24 hours.  ED course with the patient having CPR and [place].  She was noted to have VFib for which he received amiodarone as 1 of the arrhythmias during CPR.  Post thoracic EKG with first-degree AV block which is chronic for her.  Her troponin was unremarkable.  My assessment with the patient not following commands and intermittently withdrawing to pain.  Notably, ED course with notification that her oxygen had dropped to 90 despite being on 100% which was intervened on with 100% FiO2.  Early ICU course with patient having difficulty to oxygenate requiring a high PEEP/high FiO2 protocol.  Hysteresis curves with no over distention.  Workup included CTA head and neck to assess for facial droop complaints with no evidence of CVA, however, CTA  neck contrast timing was inadequate.  She was found to have a right middle lobe consolidation and a left-sided pneumothorax.  Upon transferred to the ICU following this imaging, her pneumothorax demonstrated evidence of progression to tension which warranted placement of a left pigtail catheter which was successful.  She is now undergoing a cooling protocol for management of her post arrest course along with treatment of septic shock with a pulmonary source of infection.  Family was updated over the course of the day and notified that neuro prognostication will be undertaken 48 hours from now.    Hospital/ICU Course:  No notes on file     Past Medical History:   Diagnosis Date    Abnormal EKG     Essential hypertension     Hyperlipidemia     Shortness of breath        Past Surgical History:   Procedure Laterality Date    CHOLECYSTECTOMY      EYE SURGERY      cataract removal    HYSTERECTOMY         Review of patient's allergies indicates:   Allergen Reactions    Clonidine     Hiprex [methenamine hippurate]     Sulfa (sulfonamide antibiotics)        Family History       Problem Relation (Age of Onset)    Breast cancer Mother    Stroke Father          Tobacco Use    Smoking status: Never     Passive exposure: Never    Smokeless tobacco: Never   Substance and Sexual Activity    Alcohol use: Never    Drug use: Never    Sexual activity: Not Currently      Review of Systems  Objective:     Vital Signs (Most Recent):  Temp: 96.3 °F (35.7 °C) (09/29/24 1915)  Pulse: 65 (09/29/24 1915)  Resp: 17 (09/29/24 1915)  BP: 116/61 (09/29/24 1915)  SpO2: 99 % (09/29/24 1915) Vital Signs (24h Range):  Temp:  [93.9 °F (34.4 °C)-96.8 °F (36 °C)] 96.3 °F (35.7 °C)  Pulse:  [] 65  Resp:  [] 17  SpO2:  [53 %-100 %] 99 %  BP: ()/(32-95) 116/61   Weight: 64.9 kg (143 lb 1.3 oz)  Body mass index is 27.94 kg/m².      Intake/Output Summary (Last 24 hours) at 9/29/2024 1938  Last data filed at 9/29/2024 1900  Gross per 24 hour    Intake 2858.17 ml   Output 305 ml   Net 2553.17 ml          Physical Exam  Constitutional:       Appearance: She is ill-appearing. She is not toxic-appearing.   HENT:      Head: Normocephalic and atraumatic.      Mouth/Throat:      Mouth: Mucous membranes are moist.      Comments: ETT secured  Eyes:      General: No scleral icterus.  Cardiovascular:      Rate and Rhythm: Regular rhythm. Bradycardia present.   Pulmonary:      Breath sounds: Rales present. No wheezing.      Comments: Synchronous with ventilator  Abdominal:      General: There is distension.      Palpations: Abdomen is soft.      Tenderness: There is no abdominal tenderness. There is no guarding.   Musculoskeletal:      Right lower leg: Edema present.      Left lower leg: Edema present.   Skin:     Capillary Refill: Capillary refill takes 2 to 3 seconds.      Coloration: Skin is not jaundiced.   Neurological:      Comments: Withdrawing bilateral upper and lower extremities to pain, no gag, no cough (evaluation performed shortly after sedation hold), grimacing with noxious stimuli            Vents:  Vent Mode: A/CMV-VC (09/29/24 1511)  Ventilator Initiated: Yes (09/29/24 0700)  Set Rate: 17 BPM (09/29/24 1511)  Vt Set: 470 mL (09/29/24 1511)  PEEP/CPAP: 12 cmH20 (09/29/24 1511)  Oxygen Concentration (%): 100 (09/29/24 1511)  Peak Airway Pressure: 39.4 cmH20 (09/29/24 1511)  Plateau Pressure: 29.3 cmH20 (09/29/24 1511)  Total Ve: 7.97 L/m (09/29/24 1511)  F/VT Ratio<105 (RSBI): (!) 34.86 (09/29/24 0700)  Lines/Drains/Airways       Central Venous Catheter Line  Duration             Percutaneous Central Line - Triple Lumen  09/29/24 1435 Internal Jugular Left <1 day              Drain  Duration                  Chest Tube 09/29/24 1355 Tube - 1 Left Midaxillary 14 Fr. <1 day         NG/OG Tube 09/29/24 0650 Center mouth <1 day         Urethral Catheter 09/29/24 0720 Non-latex <1 day              Airway  Duration                  Airway - Non-Surgical  09/29/24 0640 Endotracheal Tube <1 day              Peripheral Intravenous Line  Duration                  Peripheral IV - Single Lumen 09/29/24 0620 20 G Left;Posterior Hand <1 day         Peripheral IV - Single Lumen 09/29/24 0635 20 G Distal;Posterior;Right Forearm <1 day                  Significant Labs:    CBC/Anemia Profile:  Recent Labs   Lab 09/29/24  0817   WBC 14.29*   HGB 12.1   HCT 41.2      .7*   RDW 14.2        Chemistries:  Recent Labs   Lab 09/29/24  0817 09/29/24  1118 09/29/24  1750    132* 132*   K 5.0 5.2* 4.0    98 96*   CO2 30 25 32   BUN 42* 42* 47*   CREATININE 1.63* 1.53* 1.54*   CALCIUM 8.2* 8.9 8.7   ALBUMIN 2.8*  --   --    PROT 6.4  --   --    BILITOT 1.0  --   --    ALKPHOS 175*  --   --    *  --   --    AST 1,153*  --   --    MG 2.6* 2.7*  --    PHOS 6.4* 5.2*  --        All pertinent labs within the past 24 hours have been reviewed.    Significant Imaging: I have reviewed all pertinent imaging results/findings within the past 24 hours.  Assessment/Plan:     Neuro  Sedated due to medication  Cooling protocol sedaation; goal Deep sedation  - cont Fentanyl gtt for analgesia  - cont Propofol gtt for sedation  - deep sedation evaluated prior to start of cooling protocol    Pulmonary  On mechanically assisted ventilation  - obtain ABG in am  - at risk for ARDS  - cont VC/AC 17/470/12/0.5   -- maintaining on high PEEP protocol with liberalized Vt; titrated for driving pressure  - Pplat in am    Pneumonia of right lower lobe due to infectious organism  - management as per septic shock  - f/u lower resp Cxs    Pneumothorax, left  Likely traumatic post-CPR efforts vs barotrauma. S/p L pigtail catheter placement, placed to suction x1 hour with interval imaging with L lung re-expansion. Low concern for parenchymal infiltration.   - maintain chest tube to water seal  - planned d/v after completion of mechanical ventilatory  support    Cardiac/Vascular  Cardiorespiratory arrest  - post ROSC EKG reviewed with no ST deviations or TWI; known 1st degree AVB  - likely from hypoxia; work up today has included negative CT-PE, CT head negative,   - no contraindications to cooling protocol at this time  - initiated on cooling protocol 09/29  - obtain TTE, trend Tn    Renal/  JUDITH (acute kidney injury)  Last SCr 1.14 now with Cr 1.63 a/w decreased UOP in setting of cardiorespiratory arrest and septic shock. UA reviewed. Etiology 2/2 pre-jaime azotemia; CT A&P with no  pathology.   - plasmalyte x500 cc; intravascularly hypovolemic on POCUS  - renally dosing Rx  - avoid nephrotoxic agents  - strict Is/Os      ID  Septic shock  Septic shock with +UA and RLL pneumonia.  - start broad spectrum Abx with Vancomycin and Zosyn  - f/u Ucx, BCx, lower respiratory Cxs   - cont Levophed gtt via central line for goal MAP >65  - trend LA   -- ED course with patient reported to receive 2L, only 1L is documented; judicious IVF while evaluating for obstructive shock    Endocrine  Stress hyperglycemia  Hyperglycemia of critical illness. Will start managing with insulin gtt. With decreasing insulin requirements will consider ISS.  - persistent hyperglycemia in the 1st 12 hours of admission; continue insulin gtt    Orthopedic  Dislocation of right shoulder joint  Patient's family reports decreased ROM at R shoulder; had index dislocation that they believe was corrected. Concern for a fall in the interim. Will consult Ortho in am for evaluation. Intact distal vasculature with intact pulses.         Critical Care Medicine Daily Checklist:09/29/2024   F: Feeding Contraindicated.   A: Analgesia Infusion Fentanyl   S: Sedation RASS goal -3to-5 Propofol           T: Thromboprophylaxis Lower extremity SCD and Chemical prophylaxis Contraindicated.   H: HOB > 300 Yes.   U: Stress Ulcer Prophylaxis PPI   G: Glucose Control On insulin gtt for critical illness hyperglycemia.    S: SAT & SBT Coordinated?  N/A   B: Bowel Regimen N/A.   I: Indwelling Catheter Reviewed Maintain: Triple/Quad Lumen and Valdes Catheter  Remove: N/A   D: De-escalation of Antimicrobials No    Disposition ICU         Critical Care Time: 120 minutes  Critical secondary to Patient has a condition that poses threat to life and bodily function: Severe Respiratory Distress and Acute Renal Failure  Patient has an abrupt change in neurologic status: AMS post arrest  Patient is currently on drug therapy requiring intensive monitoring for toxicity: Vancomycin    Critical care was time spent personally by me on the following activities: development of treatment plan with patient or surrogate and bedside caregivers, discussions with consultants, evaluation of patient's response to treatment, examination of patient, ordering and performing treatments and interventions, ordering and review of laboratory studies, ordering and review of radiographic studies, pulse oximetry, re-evaluation of patient's condition. This critical care time did not overlap with that of any other provider or involve time for any procedures.     Laura Holley MD  Critical Care Medicine  [facility]

## 2024-09-30 NOTE — RESPIRATORY THERAPY
09/29/24 0636   Code Blue/Rapid Response   Respiratory Therapist Present Jackie Sinha   Respiratory Therapist Present Orin Crowe   Member Arrival Time 0636   Member Departure Time 0700   Rapid Reponse Initiated now   Equipment Used Resuscitation Bag with Mask   $ Chest Compressions Performed? Performed Inpatient;Performed ED;Tech Time 15 min   $ Intubation Yes

## 2024-09-30 NOTE — PLAN OF CARE
Problem: Skin Injury Risk Increased  Goal: Skin Health and Integrity  Intervention: Optimize Skin Protection  Flowsheets (Taken 9/30/2024 0529)  Pressure Reduction Techniques: sit time limited to 2 hours  Pressure Reduction Devices: positioning supports utilized  Skin Protection: incontinence pads utilized  Activity Management: Arm raise - L1  Head of Bed (HOB) Positioning: HOB at 30-45 degrees     Problem: Infection  Goal: Absence of Infection Signs and Symptoms  Intervention: Prevent or Manage Infection  Flowsheets (Taken 9/30/2024 0529)  Fever Reduction/Comfort Measures:   lightweight clothing   fluid intake increased  Infection Management:   aseptic technique maintained   cultures obtained and sent to lab  Isolation Precautions: precautions maintained

## 2024-09-30 NOTE — HPI
89 yo F with HTN and GERD presented from [facility] living with AMS with EMS team concern for a facial droop with ED course notable for a nonresponsive patient in asystole with initiation of CPR and admission to ICU thereafter for evaluation and management of cardiac arrest.      Discussed with family at various points over the course of the day; she moved to the assisted living facility after a fall where she had a right sided arm dislocation that was treated.  Since then, she was largely been independent and participates with care; in fact, ADLs include walking, toileting and eating.  In the past few weeks, she has had decreased walking which they attributed to the weather where she complained of it being too hot.  Review of ED records with patient described as having a change in mental status within less than 24 hours.  ED course with the patient having CPR and [place].  She was noted to have VFib for which he received amiodarone as 1 of the arrhythmias during CPR.  Post thoracic EKG with first-degree AV block which is chronic for her.  Her troponin was unremarkable.  My assessment with the patient not following commands and intermittently withdrawing to pain.  Notably, ED course with notification that her oxygen had dropped to 90 despite being on 100% which was intervened on with 100% FiO2.  Early ICU course with patient having difficulty to oxygenate requiring a high PEEP/high FiO2 protocol.  Hysteresis curves with no over distention.  Workup included CTA head and neck to assess for facial droop complaints with no evidence of CVA, however, CTA neck contrast timing was inadequate.  She was found to have a right middle lobe consolidation and a left-sided pneumothorax.  Upon transferred to the ICU following this imaging, her pneumothorax demonstrated evidence of progression to tension which warranted placement of a left pigtail catheter which was successful.  She is now undergoing a cooling protocol for  management of her post arrest course along with treatment of septic shock with a pulmonary source of infection.  Family was updated over the course of the day and notified that neuro prognostication will be undertaken 48 hours from now.

## 2024-09-30 NOTE — ASSESSMENT & PLAN NOTE
Cooling protocol sedaation; goal Deep sedation  - cont Fentanyl gtt for analgesia  - cont Propofol gtt for sedation  - deep sedation evaluated prior to start of cooling protocol

## 2024-09-30 NOTE — PLAN OF CARE
Problem: Infection  Goal: Absence of Infection Signs and Symptoms  Outcome: Progressing  Intervention: Prevent or Manage Infection  Flowsheets (Taken 9/30/2024 0829)  Fever Reduction/Comfort Measures: lightweight bedding  Isolation Precautions: precautions maintained     Problem: Skin Injury Risk Increased  Goal: Skin Health and Integrity  Outcome: Progressing  Intervention: Optimize Skin Protection  Flowsheets (Taken 9/30/2024 0800)  Pressure Reduction Techniques: weight shift assistance provided  Pressure Reduction Devices:   foam padding utilized   heel offloading device utilized   positioning supports utilized  Skin Protection: incontinence pads utilized  Activity Management: Patient unable to perform activities  Head of Bed (HOB) Positioning: HOB at 30-45 degrees

## 2024-09-30 NOTE — ASSESSMENT & PLAN NOTE
Likely traumatic post-CPR efforts vs barotrauma. S/p L pigtail catheter placement, placed to suction x1 hour with interval imaging with L lung re-expansion. Low concern for parenchymal infiltration.   - maintain chest tube to water seal  - planned d/v after completion of mechanical ventilatory support

## 2024-09-30 NOTE — PLAN OF CARE
SS went by room. Pt on vent and no family available. SS following.    ![phone] SS attempted to call daughter, Meg Silva @[phone]. Left voicemail. SS following.

## 2024-09-30 NOTE — PLAN OF CARE
[facility] ICU  Initial Discharge Assessment       Primary Care Provider: Candida Slaughter FNP    Admission Diagnosis: Ventricular fibrillation [I49.01]  Cardiac arrest [I46.9]  Shortness of breath [R06.02]  Altered mental status, unspecified altered mental status type [R41.82]    Admission Date: 9/29/2024  Expected Discharge Date: 10/15/2024         Payor: MEDICARE / Plan: MEDICARE PART A & B / Product Type: Government /     Extended Emergency Contact Information  Primary Emergency Contact: Meg Silva  Address: [address] [address]  Mobile Phone: [phone]  Relation: Daughter  Secondary Emergency Contact: REGINA SPANN  Address: [address] [address]  Mobile Phone: [phone]  Relation: Son  Preferred language: English   needed? No    Discharge Plan A: Skilled Nursing Facility  Discharge Plan B: Home Health, Assisted Living      [facility] [place] [address] [place] [address]  Phone: [phone] Fax: [phone]      Initial Assessment (most recent)       Adult Discharge Assessment - 09/30/24 1552          Discharge Assessment    Assessment Type Discharge Planning Assessment     Confirmed/corrected address, phone number and insurance Yes     Source of Information family     If unable to respond/provide information was family/caregiver contacted? Yes     Contact Name/Number Meg Silva (Daughter)  [phone]     Communicated MADHURI with patient/caregiver Date not available/Unable to determine     Reason For Admission Cardiorespiratory arrest     Facility Arrived From: [facility]     Do you expect to return to your current living situation? Yes     Prior to hospitilization cognitive status: Unable to Assess     Current cognitive status: Coma/Sedated/Intubated     Walking or  Climbing Stairs Difficulty yes     Walking or Climbing Stairs ambulation difficulty, requires equipment     Mobility Management Rollator     Dressing/Bathing Difficulty yes     Dressing/Bathing bathing difficulty, assistance 1 person     Equipment Currently Used at Home rollator     Readmission within 30 days? No     Do you currently have service(s) that help you manage your care at home? No     Do you take prescription medications? Yes     Do you have prescription coverage? Yes     Do you have any problems affording any of your prescribed medications? No     Is the patient taking medications as prescribed? yes     Who is going to help you get home at discharge? Sitter through [employer] 3x/week     How do you get to doctors appointments? family or friend will provide     Are you on dialysis? No     Do you take coumadin? No     Discharge Plan A Skilled Nursing Facility     Discharge Plan B Home Health;Assisted Living     DME Needed Upon Discharge  none     Discharge Plan discussed with: Adult children        Physical Activity    On average, how many days per week do you engage in moderate to strenuous exercise (like a brisk walk)? 0 days     On average, how many minutes do you engage in exercise at this level? 0 min        Financial Resource Strain    How hard is it for you to pay for the very basics like food, housing, medical care, and heating? Not hard at all        Housing Stability    In the last 12 months, was there a time when you were not able to pay the mortgage or rent on time? No     At any time in the past 12 months, were you homeless or living in a shelter (including now)? No        Transportation Needs    Has the lack of transportation kept you from medical appointments, meetings, work or from getting things needed for daily living? No        Food Insecurity    Within the past 12 months, you worried that your food would run out before you got the money to buy more. Never true     Within the past 12  months, the food you bought just didn't last and you didn't have money to get more. Never true        Social Isolation    How often do you feel lonely or isolated from those around you?  Never        Alcohol Use    Q1: How often do you have a drink containing alcohol? Never     Q3: How often do you have six or more drinks on one occasion? Never        Utilities    In the past 12 months has the electric, gas, oil, or water company threatened to shut off services in your home? No        Health Literacy    How often do you need to have someone help you when you read instructions, pamphlets, or other written material from your doctor or pharmacy? Rarely                   Pt on vent at this time. SS spoke with daughter, Meg Silva. Pt is a resident of [facility] and had assistance with ADLs and used a rollator walker. Daughter would like for pt to go to [facility] for rehab when medically ready for d/c and if needed. SS following

## 2024-10-01 PROBLEM — G92.9 ENCEPHALOPATHY, TOXIC: Status: ACTIVE | Noted: 2024-01-01

## 2024-10-01 PROBLEM — E87.6 HYPOKALEMIA: Status: ACTIVE | Noted: 2024-01-01

## 2024-10-01 NOTE — PLAN OF CARE
Problem: Mechanical Ventilation Invasive  Goal: Effective Communication  Outcome: Progressing  Goal: Optimal Device Function  Outcome: Progressing  Goal: Mechanical Ventilation Liberation  Outcome: Progressing  Intervention: Promote Extubation and Mechanical Ventilation Liberation  Flowsheets (Taken 10/1/2024 1036)  Environmental Support: calm environment promoted     Problem: UTI (Urinary Tract Infection)  Goal: Improved Infection Symptoms  Outcome: Progressing  Intervention: Prevent Infection Progression  Flowsheets (Taken 10/1/2024 1036)  Fever Reduction/Comfort Measures: lightweight bedding

## 2024-10-01 NOTE — CONSULTS
[facility]  Adult Nutrition  Consult Note         Reason for Assessment  Reason For Assessment: consult (enteral feeding recommendations)   Nutrition Risk Screen: no indicators present    Assessment and Plan  Consult received and appreciated. Consult for enteral feeding recommendations. Patient is a 89yo female admitted 9/29 for cardiorespiratory arrest.     Patient is 87.6kg with a BMI of 37.72 and is obese. Recommend start Isosource 1.5 with a goal rate of 50mL/hour with 40mL/hour free water flush. Keep HOB at 30-45 degrees to reduce risk of aspiration. Start rate at 20mL/hour and advance by 10mL q8h until goal rate is reached. Monitor for tolerance: n/v/d/c. Hold feeding and notify RD if symptoms of intolerance develop.     Last BM 9/29 per flowsheet.    Medications/labs reviewed. RD following.        Learning Needs/Social Determinants of Health    Learning Assessment       09/29/2024 1520 [facility] (9/29/2024 - Present)   Created by Ankita Skelton, RN - RN (Nurse) Status: Complete                 PRIMARY LEARNER     Primary Learner Name:  Meg EL - 09/29/2024 1520    Relationship:  Family EL - 09/29/2024 1520    Does the primary learner have any barriers to learning?:  No Barriers EL - 09/29/2024 1520    What is the preferred language of the primary learner?:  English EL - 09/29/2024 1520    Is an  required?:  No  - 09/29/2024 1520    How does the primary learner prefer to learn new concepts?:  Listening EL - 09/29/2024 1520    How often do you need to have someone help you read instructions, pamphlets, or written material from your doctor or pharmacy?:  Never EL - 09/29/2024 1520        CO-LEARNER #1     No question answered        CO-LEARNER #2     No question answered        SPECIAL TOPICS     No question answered        ANSWERED BY:     No question answered        Comments         Edit History       Ankita Skelton, RN - RN (Nurse)   09/29/2024  1520                           Social Drivers of Health     Tobacco Use: Low Risk  (9/29/2024)    Patient History     Smoking Tobacco Use: Never     Smokeless Tobacco Use: Never     Passive Exposure: Never   Alcohol Use: Not At Risk (9/30/2024)    AUDIT-C     Frequency of Alcohol Consumption: Never     Average Number of Drinks: Patient does not drink     Frequency of Binge Drinking: Never   Financial Resource Strain: Low Risk  (9/30/2024)    Overall Financial Resource Strain (CARDIA)     Difficulty of Paying Living Expenses: Not hard at all   Food Insecurity: No Food Insecurity (9/30/2024)    Hunger Vital Sign     Worried About Running Out of Food in the Last Year: Never true     Ran Out of Food in the Last Year: Never true   Transportation Needs: No Transportation Needs (9/30/2024)    TRANSPORTATION NEEDS     Transportation : No   Physical Activity: Inactive (9/30/2024)    Exercise Vital Sign     Days of Exercise per Week: 0 days     Minutes of Exercise per Session: 0 min   Stress: No Stress Concern Present (9/30/2024)    [employer] - Occupational Stress Questionnaire     Feeling of Stress : Not at all   Housing Stability: Low Risk  (9/30/2024)    Housing Stability Vital Sign     Unable to Pay for Housing in the Last Year: No     Homeless in the Last Year: No   Depression: High Risk (4/2/2024)    Depression     Last PHQ-4: Flowsheet Data: 10   Utilities: Not At Risk (9/30/2024)    [facility] Utilities     Threatened with loss of utilities: No   Health Literacy: Adequate Health Literacy (9/30/2024)     Health Literacy     Frequency of need for help with medical instructions: Rarely   Social Isolation: Socially Integrated (9/30/2024)    Social Isolation     Social Isolation: 1          Malnutrition  Is Patient Malnourished: No    Nutrition Diagnosis  Inadequate energy intake related to Decreased ability to consume sufficient energy as evidenced by intubated  Comments: initiate enteral  feeds    Recent Labs   Lab 10/01/24  0011 10/01/24  0527   *  --    POCGLU  --  127*     Comments on Glucose: Glucose elevated. No PMH DM2. Stress induced hyperglycemia    Nutrition Prescription / Recommendations  Recommendation/Intervention: Recommend start isosource 1.5 with a goal rate of 50mL/hour with 40mL/hour free water flush. Keep HOB at 30-45 degrees to reduce risk of aspiration. Start at 20mL/hour and advance by 10mL q8h until goal rate is reached. Monitor for tolerance: n/v/d/c. Hold feeding and notify RD if symptoms of intolerance develop.  Goals: Enteral feeding tolerance at goal, weight maintenance during admission  Nutrition Goal Status: new  Current Diet Order: NPO  Chewing or Swallowing Difficulty?: No Chewing or swallowing difficulty  Recommended Diet: Enteral Nutrition  Recommended Oral Supplement: No Oral Supplements  Is Nutrition Support Recommended:   Needs Calculated    Energy Calorie Requirements (kcal): 1752-2190kcal (20-25kcal/kg)  Protein Requirements: 88-105g (1.0-1.2g/kg)  Enteral Nutrition   Enteral Nutrition Formula Provides:  1800 kcals Propofol Rate: Yes - 153kcal  81 g Protein  202 g Carbohydrates  78 g Fat Propofol Rate: Yes -13g  933 ml Fluid without Flush    960 ml Fluid by flush   1893 ml Total Fluid  Enteral Nutrition Recommended Order:  Tube feeding via NG/ [place] Sump  Tube feeding formula: Isosource 1.5 NG/ [place] Sump at 50mL/hour  Free Water Flush: 40 ml hourly  Modular Supplements:No Modular Supplements needed  Enteral Nutrition meets needs?: yes  Enteral Nutrition Status: New Order  Is Nutrition Education Recommended: No    Monitor and Evaluation  % current Intake: NPO  % intake to meet estimated needs: Enteral Nutrition   Food and Nutrient Intake: enteral nutrition intake  Food and Nutrient Adminstration: enteral and parenteral nutrition administration  Anthropometric Measurements: weight, weight change  Biochemical Data, Medical Tests and Procedures: electrolyte  and renal panel, gastrointestinal profile, glucose/endocrine profile, inflammatory profile, lipid profile       Current Medical Diagnosis and Past Medical History     Past Medical History:   Diagnosis Date    Abnormal EKG     Essential hypertension     Hyperlipidemia     Shortness of breath        Nutrition/Diet History       Lab/Procedures/Meds  Recent Labs   Lab 09/29/24  0817 09/29/24  1118 10/01/24  0011      < > 136   K 5.0   < > 3.1*   BUN 42*   < > 38*   CREATININE 1.63*   < > 0.98   CALCIUM 8.2*   < > 7.4*   ALBUMIN 2.8*  --   --       < > 102   *  --   --    AST 1,153*  --   --    PHOS 6.4*   < > 3.1    < > = values in this interval not displayed.   Note: K+, Ca++ low. BUN elevated. ALT/AST elevated. Alb low. Possibly related to elevated ALT/AST      Last A1c:   Lab Results   Component Value Date    HGBA1C 6.4 03/08/2024     Lab Results   Component Value Date    RBC 3.26 (L) 10/01/2024    HGB 9.6 (L) 10/01/2024    HCT 30.0 (L) 10/01/2024    MCV 92.0 10/01/2024    MCH 29.4 10/01/2024    MCHC 32.0 10/01/2024   Note: H&H low    Pertinent Labs Reviewed: reviewed  Pertinent Medications Reviewed: reviewed  Scheduled Meds:   chlorhexidine  15 mL Mouth/Throat BID    mupirocin   Nasal BID    pantoprazole  40 mg Intravenous Daily    piperacillin-tazobactam (Zosyn) IV (PEDS and ADULTS) (extended infusion is not appropriate)  4.5 g Intravenous Q8H    vancomycin (VANCOCIN) IV (PEDS and ADULTS)  1,500 mg Intravenous Q24H     Continuous Infusions:   NORepinephrine bitartrate-D5W  0-3 mcg/kg/min Intravenous Continuous 3 mL/hr at 10/01/24 0600 0.1 mcg/kg/min at 10/01/24 0600    propofoL  0-50 mcg/kg/min Intravenous Continuous 5.8 mL/hr at 10/01/24 0600 15 mcg/kg/min at 10/01/24 0600     PRN Meds:.  Current Facility-Administered Medications:     0.9% NaCl, , Intravenous, PRN    dextrose 10%, 12.5 g, Intravenous, PRN    dextrose 10%, 25 g, Intravenous, PRN    glucagon (human recombinant), 1 mg,  Intramuscular, PRN    insulin aspart U-100, 0-10 Units, Subcutaneous, Q6H PRN    ondansetron, 4 mg, Intravenous, Q8H PRN    sodium chloride 0.9%, 10 mL, Intravenous, PRN    Pharmacy to dose Vancomycin consult, , , Once **AND** vancomycin - pharmacy to dose, , Intravenous, pharmacy to manage frequency    Anthropometrics  Temp: 98.4 °F (36.9 °C)  Height Method: Stated  Height: 5' (152.4 cm)  Height (inches): 60 in  Weight Method: Bed Scale  Weight: 87.6 kg (193 lb 2 oz)  Weight (lb): 193.12 lb  Ideal Body Weight (IBW), Female: 100 lb  % Ideal Body Weight, Female (lb): 143.08 %  BMI (Calculated): 37.7       Estimated/Assessed Needs  RMR ([place]-St. Jeor Equation): 1217.5   Total Ve: 6.53 L/m Temp: 98.4 °F (36.9 °C)Core Esophageal  Weight Used For Calorie Calculations: 87.6 kg (193 lb 2 oz)     Energy Calorie Requirements (kcal): 1752-2190kcal (20-25kcal/kg)  Weight Used For Protein Calculations: 87.6 kg (193 lb 2 oz)  Protein Requirements: 88-105g (1.0-1.2g/kg)       RDA Method (mL): 1752       Nutrition by Nursing  Diet/Nutrition Received: ice chips           Last Bowel Movement: 09/29/24 (unknown)                Nutrition Follow-Up  RD Follow-up?: Yes      Nutrition Discharge Planning: Unsure of discharge plans at this time. Will monitor and assess closer to discharge.          Sania Baxter, MS, RD, LD  Available via Secure Chat

## 2024-10-01 NOTE — ASSESSMENT & PLAN NOTE
Creatinine is back down to normal urine output is improving.  Continue current evaluation of volume status

## 2024-10-01 NOTE — ASSESSMENT & PLAN NOTE
- patient is status post targeted temperature management currently has no corneal responses but does have intermittent gag.  Were going to go ahead and stop her fentanyl.  Will consider weaning Diprivan today.  Currently no evidence of stroke no cardiac cause still wonder if this is hypoxia with infection as the cause will have to assess what her encephalopathy looks like as she recovers we removed sedation

## 2024-10-01 NOTE — PLAN OF CARE
Problem: Mechanical Ventilation Invasive  Goal: Mechanical Ventilation Liberation  Intervention: Promote Extubation and Mechanical Ventilation Liberation  Flowsheets (Taken 10/1/2024 0607)  Sleep/Rest Enhancement: awakenings minimized  Environmental Support:   calm environment promoted   caregiver consistency promoted  Medication Review/Management: medications reviewed     Problem: Sepsis/Septic Shock  Goal: Blood Glucose Level Within Targeted Range  Intervention: Optimize Glycemic Control  Flowsheets (Taken 10/1/2024 0607)  Glycemic Management: blood glucose monitored

## 2024-10-01 NOTE — PROGRESS NOTES
[facility]  Critical Care Medicine  Progress Note    Patient Name: Purnima Cardona  MRN: [id number]  Admission Date: 9/29/2024  Hospital Length of Stay: 2 days  Code Status: Full Code  Attending Provider: Arnulfo Interiano MD  Primary Care Provider: Candida Slaughter FNP   Principal Problem: Cardiorespiratory arrest    Subjective:     HPI:  89 yo F with HTN and GERD presented from [facility] assistant living with AMS with EMS team concern for a facial droop with ED course notable for a nonresponsive patient in asystole with initiation of CPR and admission to ICU thereafter for evaluation and management of cardiac arrest.      Discussed with family at various points over the course of the day; she moved to the assisted living facility after a fall where she had a right sided arm dislocation that was treated.  Since then, she was largely been independent and participates with care; in fact, ADLs include walking, toileting and eating.  In the past few weeks, she has had decreased walking which they attributed to the weather where she complained of it being too hot.  Review of ED records with patient described as having a change in mental status within less than 24 hours.  ED course with the patient having CPR and [place].  She was noted to have VFib for which he received amiodarone as 1 of the arrhythmias during CPR.  Post thoracic EKG with first-degree AV block which is chronic for her.  Her troponin was unremarkable.  My assessment with the patient not following commands and intermittently withdrawing to pain.  Notably, ED course with notification that her oxygen had dropped to 90 despite being on 100% which was intervened on with 100% FiO2.  Early ICU course with patient having difficulty to oxygenate requiring a high PEEP/high FiO2 protocol.  Hysteresis curves with no over distention.  Workup included CTA head and neck to assess for facial droop complaints with no evidence of CVA, however, CTA  neck contrast timing was inadequate.  She was found to have a right middle lobe consolidation and a left-sided pneumothorax.  Upon transferred to the ICU following this imaging, her pneumothorax demonstrated evidence of progression to tension which warranted placement of a left pigtail catheter which was successful.  She is now undergoing a cooling protocol for management of her post arrest course along with treatment of septic shock with a pulmonary source of infection.  Family was updated over the course of the day and notified that neuro prognostication will be undertaken 48 hours from now.    Hospital/ICU Course:  No notes on file    Interval History/Significant Events:  Sedated    Review of Systems  Objective:     Vital Signs (Most Recent):  Temp: 97.9 °F (36.6 °C) (10/01/24 0500)  Pulse: (!) 55 (10/01/24 0500)  Resp: 17 (10/01/24 0500)  BP: (!) 129/50 (10/01/24 0500)  SpO2: 100 % (10/01/24 0500) Vital Signs (24h Range):  Temp:  [95.5 °F (35.3 °C)-98.4 °F (36.9 °C)] 97.9 °F (36.6 °C)  Pulse:  [51-73] 55  Resp:  [15-21] 17  SpO2:  [98 %-100 %] 100 %  BP: ()/() 129/50   Weight: 87.6 kg (193 lb 2 oz)  Body mass index is 37.72 kg/m².      Intake/Output Summary (Last 24 hours) at 10/1/2024 0549  Last data filed at 10/1/2024 0301  Gross per 24 hour   Intake 1117.98 ml   Output 1088 ml   Net 29.98 ml          Physical Exam  Vitals reviewed.   Constitutional:       Appearance: Normal appearance.      Interventions: She is sedated and intubated.   HENT:      Head: Normocephalic and atraumatic.      Nose: Nose normal.      Mouth/Throat:      Mouth: Mucous membranes are dry.      Pharynx: Oropharynx is clear.   Eyes:      Extraocular Movements: Extraocular movements intact.      Conjunctiva/sclera: Conjunctivae normal.      Pupils: Pupils are equal, round, and reactive to light.   Cardiovascular:      Rate and Rhythm: Normal rate.      Heart sounds: Normal heart sounds. No murmur heard.  Pulmonary:       Effort: Pulmonary effort is normal. She is intubated.      Breath sounds: Normal breath sounds.   Abdominal:      General: Abdomen is flat. Bowel sounds are normal.      Palpations: Abdomen is soft.   Musculoskeletal:         General: Normal range of motion.      Cervical back: Normal range of motion and neck supple.      Right lower leg: No edema.      Left lower leg: No edema.   Skin:     General: Skin is warm and dry.      Capillary Refill: Capillary refill takes less than 2 seconds.   Neurological:      General: No focal deficit present.      Mental Status: She is alert and oriented to person, place, and time.   Psychiatric:         Mood and Affect: Mood normal.         Behavior: Behavior normal.            Vents:  Vent Mode: A/CMV-VC (10/01/24 0354)  Ventilator Initiated: Yes (09/29/24 0700)  Set Rate: 17 BPM (10/01/24 0354)  Vt Set: 470 mL (10/01/24 0354)  PEEP/CPAP: 12 cmH20 (10/01/24 0354)  Oxygen Concentration (%): 45 (10/01/24 0354)  Peak Airway Pressure: 33.8 cmH20 (10/01/24 0354)  Plateau Pressure: 27.8 cmH20 (10/01/24 0354)  Total Ve: 7.93 L/m (10/01/24 0354)  F/VT Ratio<105 (RSBI): (!) 36.4 (09/30/24 1530)  Lines/Drains/Airways       Central Venous Catheter Line  Duration             Percutaneous Central Line - Triple Lumen  09/29/24 1435 Internal Jugular Left 1 day              Drain  Duration                  Chest Tube 09/29/24 1355 Tube - 1 Left Midaxillary 14 Fr. 1 day         NG/OG Tube 09/29/24 0650 Center mouth 1 day         Urethral Catheter 09/29/24 0720 Non-latex 1 day              Airway  Duration                  Airway - Non-Surgical 09/29/24 0640 Endotracheal Tube 1 day              Peripheral Intravenous Line  Duration                  Peripheral IV - Single Lumen 09/29/24 0620 20 G Left;Posterior Hand 1 day         Peripheral IV - Single Lumen 09/29/24 0635 20 G Distal;Posterior;Right Forearm 1 day                  Significant Labs:    CBC/Anemia Profile:  Recent Labs   Lab  09/29/24  0656 09/29/24  0817 09/30/24  0350   WBC  --  14.29* 11.49*   HGB  --  12.1 10.0*   HCT 40 41.2 31.4*   PLT  --  190 154   MCV  --  101.7* 94.0   RDW  --  14.2 13.5        Chemistries:  Recent Labs   Lab 09/29/24  0817 09/29/24  1118 09/30/24  0811 09/30/24  1549 10/01/24  0011      < > 132* 132* 136   K 5.0   < > 3.7 3.7 3.1*      < > 96* 96* 102   CO2 30   < > 31 32 27   BUN 42*   < > 43* 41* 38*   CREATININE 1.63*   < > 1.33* 1.21* 0.98   CALCIUM 8.2*   < > 8.4* 8.4* 7.4*   ALBUMIN 2.8*  --   --   --   --    PROT 6.4  --   --   --   --    BILITOT 1.0  --   --   --   --    ALKPHOS 175*  --   --   --   --    *  --   --   --   --    AST 1,153*  --   --   --   --    MG 2.6*   < > 2.8* 2.9* 2.2   PHOS 6.4*   < > 3.1 3.3 3.1    < > = values in this interval not displayed.       Recent Lab Results  (Last 5 results in the past 24 hours)        10/01/24  0011   09/30/24  2325   09/30/24  1549   09/30/24  1120   09/30/24  0811        Anion Gap 10     8     9       BUN 38     41     43       BUN/CREAT RATIO 39     34     32       Calcium 7.4     8.4     8.4       Chloride 102     96     96       CO2 27     32     31       Creatinine 0.98     1.21     1.33       eGFR 55     43     38       Glucose 141     144     129       Magnesium  2.2     2.9     2.8       Phosphorus Level 3.1     3.3     3.1       POC Glucose   139     125         Potassium 3.1     3.7     3.7       Sodium 136     132     132                              Significant Imaging:  I have reviewed all pertinent imaging results/findings within the past 24 hours.    ABG  Recent Labs   Lab 09/29/24  1028   PH 7.36   PO2 56*   PCO2 55*   HCO3 31.1*     Assessment/Plan:     Neuro  Encephalopathy, toxic  Currently unresponsive do not know if this is due to hypoxic encephalopathy were medicines were going to begin weaning sedation    Sedated due to medication  Cooling protocol sedaation; goal Deep sedation  - cont Fentanyl gtt for  analgesia  - cont Propofol gtt for sedation  - deep sedation evaluated prior to start of cooling protocol    Pulmonary  On mechanically assisted ventilation  Peak pressure is elevated were going to decrease tidal volume to 450 decrease respiratory rate to 15 leave PEEP at 12 check some arterial blood gases.  Assess her mental status    Pneumonia of right lower lobe due to infectious organism  Still with obvious infiltrate awaiting cultures continue antibiotics follow x-ray    Pneumothorax, left  Will continue chest tube until extubated    Cardiac/Vascular  * Cardiorespiratory arrest  - patient is status post targeted temperature management currently has no corneal responses but does have intermittent gag.  Were going to go ahead and stop her fentanyl.  Will consider weaning Diprivan today.  Currently no evidence of stroke no cardiac cause still wonder if this is hypoxia with infection as the cause will have to assess what her encephalopathy looks like as she recovers we removed sedation    Renal/  Hypokalemia  Supplement    JUDITH (acute kidney injury)  Creatinine is back down to normal urine output is improving.  Continue current evaluation of volume status    ID  Septic shock  Currently improving on vanc and Zosyn has Gram-negative rods growing in the urine otherwise no positive blood cultures.  Continues on Levophed at 0.1 microgram/kilogram per minute wean that as possible review volume status    Endocrine  Stress hyperglycemia  Shares remain at goal    Orthopedic  Dislocation of right shoulder joint  X-ray shows chronic right shoulder dislocation awaiting ortho consult       Critical Care Daily Checklist:    A: Awake: RASS Goal/Actual Goal: RASS Goal: -4-->deep sedation  Actual:     B: Spontaneous Breathing Trial Performed?     C: SAT & SBT Coordinated?  Yes                      D: Delirium: CAM-ICU     E: Early Mobility Performed? Yes   F: Feeding Goal:    Status:     Current Diet Order   Procedures    Diet NPO       AS: Analgesia/Sedation Diprivan   T: Thromboembolic Prophylaxis Patient has SCD hose can anticoagulated   H: HOB > 300 Yes   U: Stress Ulcer Prophylaxis (if needed) Protonix drip   G: Glucose Control At goal   B: Bowel Function     I: Indwelling Catheter (Lines & Valdes) Necessity Valdes, central line day 3   D: De-escalation of Antimicrobials/Pharmacotherapies Not appropriate    Plan for the day/ETD Wean sedation    Code Status:  Family/Goals of Care: Full Code  Discussed with family yesterday     Critical Care Time:  40 minutes  Critical secondary to Patient has a condition that poses threat to life and bodily function:  Status post cardiac arrest with encephalopathy mechanical ventilation and vasopressors      Critical care was time spent personally by me on the following activities: development of treatment plan with patient or surrogate and bedside caregivers, discussions with consultants, evaluation of patient's response to treatment, examination of patient, ordering and performing treatments and interventions, ordering and review of laboratory studies, ordering and review of radiographic studies, pulse oximetry, re-evaluation of patient's condition. This critical care time did not overlap with that of any other provider or involve time for any procedures.     Arnulfo Interiano MD  Critical Care Medicine  [facility]

## 2024-10-01 NOTE — PROGRESS NOTES
Pharmacokinetic Assessment Follow Up: IV Vancomycin    Vancomycin serum concentration assessment(s):    The random level was drawn correctly and can be used to guide therapy at this time. The measurement is below the desired definitive target range of 15 to 20 mcg/mL.    Vancomycin Regimen Plan:    Change regimen to Vancomycin 1500 mg IV every 24 hours with next serum trough concentration measured at 0930 prior to 3rd dose on 10/3.    Drug levels (last 3 results):  Recent Labs   Lab Result Units 10/01/24  0642   Vancomycin, Trough µg/mL 6.4*       Pharmacy will continue to follow and monitor vancomycin.    Please contact pharmacy at extension [phone] for questions regarding this assessment.    Thank you for the consult,   Darrell Saez, PharmD, BCPS       Patient brief summary:  Purnima Cardona is a 89 y.o. female initiated on antimicrobial therapy with IV Vancomycin for treatment of sepsis    The patient's current regimen is 1500mg q24h (Previously 1250mg x1 dose). Patient's weight on admission was recorded as 64.9kg but weight today is 83.5kg (confirmed with nursing). Unsure if volume overloaded at this time.    Drug Allergies:   Review of patient's allergies indicates:   Allergen Reactions    Clonidine     Hiprex [methenamine hippurate]     Sulfa (sulfonamide antibiotics)        Actual Body Weight:   83.5kg    Renal Function:   Estimated Creatinine Clearance: 37.5 mL/min (based on SCr of 0.98 mg/dL).,     Dialysis Method (if applicable):  N/A    CBC (last 72 hours):  Recent Labs   Lab Result Units 09/29/24  0817 09/30/24  0350 10/01/24  0642   WBC K/uL 14.29* 11.49* 11.49*   Hemoglobin g/dL 12.1 10.0* 9.6*   Hematocrit % 41.2 31.4* 30.0*   Platelet Count K/uL 190 154 154   Lymphocytes % % 9.8* 18.1* 14.7*   Monocytes % % 4.0 5.0 5.6   Eosinophils % % 0.1* 0.6* 0.4*   Basophils % % 0.3 0.3 0.2   Diff Type  Auto Auto Auto       Metabolic Panel (last 72 hours):  Recent Labs   Lab Result Units 09/29/24  0817  09/29/24  0927 09/29/24  1118 09/29/24  1742 09/29/24  1750 09/29/24  2347 09/30/24  0811 09/30/24  1549 10/01/24  0011   Sodium mmol/L 138  --  132*  --  132* 134* 132* 132* 136   Potassium mmol/L 5.0  --  5.2*  --  4.0 3.6 3.7 3.7 3.1*   Chloride mmol/L 100  --  98  --  96* 99 96* 96* 102   CO2 mmol/L 30  --  25  --  32 27 31 32 27   Glucose mg/dL 317*  --  357* 264* 257* 160* 129* 144* 141*   Glucose, UA mg/dL  --  Normal  --   --   --   --   --   --   --    BUN mg/dL 42*  --  42*  --  47* 44* 43* 41* 38*   Creatinine mg/dL 1.63*  --  1.53*  --  1.54* 1.23* 1.33* 1.21* 0.98   Albumin g/dL 2.8*  --   --   --   --   --   --   --   --    Bilirubin, Total mg/dL 1.0  --   --   --   --   --   --   --   --    Alk Phos U/L 175*  --   --   --   --   --   --   --   --    AST U/L 1,153*  --   --   --   --   --   --   --   --    ALT U/L 782*  --   --   --   --   --   --   --   --    Magnesium mg/dL 2.6*  --  2.7*  --   --  2.6* 2.8* 2.9* 2.2   Phosphorus mg/dL 6.4*  --  5.2*  --   --  2.7 3.1 3.3 3.1       Vancomycin Administrations:  vancomycin given in the last 96 hours                     vancomycin (VANCOCIN) 1,250 mg in D5W 250 mL IVPB ()  Restarted 09/29/24 1916      Restarted  1746     1,250 mg New Bag  1730                    Microbiologic Results:  Microbiology Results (last 7 days)       Procedure Component Value Units Date/Time    Culture, Lower Respiratory [[id number]]  (Abnormal) Collected: 09/29/24 1807    Order Status: Completed Specimen: Respiratory from ET Tube Secretions Updated: 10/01/24 0804     Culture, Lower Respiratory Heavy Growth Staphylococcus aureus     Comment: Susceptibility To Follow         Heavy Growth Streptococcus agalactiae (Group B)     Comment: Susceptibility testing is not routinely done on this isolate due to it's universial susceptibility to Penicillin and other beta-lactams.        Gram Stain Result Many Gram positive cocci      Moderate WBC seen    Blood culture #1 [[id number]]  Collected: 09/29/24 0817    Order Status: Completed Specimen: Blood Updated: 10/01/24 0715     Culture, Blood No Growth At 24 Hours    Blood culture #2 [[id number]] Collected: 09/29/24 0817    Order Status: Completed Specimen: Blood Updated: 10/01/24 0715     Culture, Blood No Growth At 24 Hours    Urine culture [[id number]]  (Abnormal)  (Susceptibility) Collected: 09/29/24 0927    Order Status: Completed Specimen: Urine, Catheterized Updated: 10/01/24 0636     Culture, Urine >100,000 Escherichia coli

## 2024-10-01 NOTE — ASSESSMENT & PLAN NOTE
Peak pressure is elevated were going to decrease tidal volume to 450 decrease respiratory rate to 15 leave PEEP at 12 check some arterial blood gases.  Assess her mental status

## 2024-10-01 NOTE — SUBJECTIVE & OBJECTIVE
Interval History/Significant Events:  Sedated    Review of Systems  Objective:     Vital Signs (Most Recent):  Temp: 97.9 °F (36.6 °C) (10/01/24 0500)  Pulse: (!) 55 (10/01/24 0500)  Resp: 17 (10/01/24 0500)  BP: (!) 129/50 (10/01/24 0500)  SpO2: 100 % (10/01/24 0500) Vital Signs (24h Range):  Temp:  [95.5 °F (35.3 °C)-98.4 °F (36.9 °C)] 97.9 °F (36.6 °C)  Pulse:  [51-73] 55  Resp:  [15-21] 17  SpO2:  [98 %-100 %] 100 %  BP: ()/() 129/50   Weight: 87.6 kg (193 lb 2 oz)  Body mass index is 37.72 kg/m².      Intake/Output Summary (Last 24 hours) at 10/1/2024 0549  Last data filed at 10/1/2024 0301  Gross per 24 hour   Intake 1117.98 ml   Output 1088 ml   Net 29.98 ml          Physical Exam  Vitals reviewed.   Constitutional:       Appearance: Normal appearance.      Interventions: She is sedated and intubated.   HENT:      Head: Normocephalic and atraumatic.      Nose: Nose normal.      Mouth/Throat:      Mouth: Mucous membranes are dry.      Pharynx: Oropharynx is clear.   Eyes:      Extraocular Movements: Extraocular movements intact.      Conjunctiva/sclera: Conjunctivae normal.      Pupils: Pupils are equal, round, and reactive to light.   Cardiovascular:      Rate and Rhythm: Normal rate.      Heart sounds: Normal heart sounds. No murmur heard.  Pulmonary:      Effort: Pulmonary effort is normal. She is intubated.      Breath sounds: Normal breath sounds.   Abdominal:      General: Abdomen is flat. Bowel sounds are normal.      Palpations: Abdomen is soft.   Musculoskeletal:         General: Normal range of motion.      Cervical back: Normal range of motion and neck supple.      Right lower leg: No edema.      Left lower leg: No edema.   Skin:     General: Skin is warm and dry.      Capillary Refill: Capillary refill takes less than 2 seconds.   Neurological:      General: No focal deficit present.      Mental Status: She is alert and oriented to person, place, and time.   Psychiatric:         Mood  and Affect: Mood normal.         Behavior: Behavior normal.            Vents:  Vent Mode: A/CMV-VC (10/01/24 0354)  Ventilator Initiated: Yes (09/29/24 0700)  Set Rate: 17 BPM (10/01/24 0354)  Vt Set: 470 mL (10/01/24 0354)  PEEP/CPAP: 12 cmH20 (10/01/24 0354)  Oxygen Concentration (%): 45 (10/01/24 0354)  Peak Airway Pressure: 33.8 cmH20 (10/01/24 0354)  Plateau Pressure: 27.8 cmH20 (10/01/24 0354)  Total Ve: 7.93 L/m (10/01/24 0354)  F/VT Ratio<105 (RSBI): (!) 36.4 (09/30/24 1530)  Lines/Drains/Airways       Central Venous Catheter Line  Duration             Percutaneous Central Line - Triple Lumen  09/29/24 1435 Internal Jugular Left 1 day              Drain  Duration                  Chest Tube 09/29/24 1355 Tube - 1 Left Midaxillary 14 Fr. 1 day         NG/OG Tube 09/29/24 0650 Center mouth 1 day         Urethral Catheter 09/29/24 0720 Non-latex 1 day              Airway  Duration                  Airway - Non-Surgical 09/29/24 0640 Endotracheal Tube 1 day              Peripheral Intravenous Line  Duration                  Peripheral IV - Single Lumen 09/29/24 0620 20 G Left;Posterior Hand 1 day         Peripheral IV - Single Lumen 09/29/24 0635 20 G Distal;Posterior;Right Forearm 1 day                  Significant Labs:    CBC/Anemia Profile:  Recent Labs   Lab 09/29/24  0656 09/29/24  0817 09/30/24  0350   WBC  --  14.29* 11.49*   HGB  --  12.1 10.0*   HCT 40 41.2 31.4*   PLT  --  190 154   MCV  --  101.7* 94.0   RDW  --  14.2 13.5        Chemistries:  Recent Labs   Lab 09/29/24  0817 09/29/24  1118 09/30/24  0811 09/30/24  1549 10/01/24  0011      < > 132* 132* 136   K 5.0   < > 3.7 3.7 3.1*      < > 96* 96* 102   CO2 30   < > 31 32 27   BUN 42*   < > 43* 41* 38*   CREATININE 1.63*   < > 1.33* 1.21* 0.98   CALCIUM 8.2*   < > 8.4* 8.4* 7.4*   ALBUMIN 2.8*  --   --   --   --    PROT 6.4  --   --   --   --    BILITOT 1.0  --   --   --   --    ALKPHOS 175*  --   --   --   --    *  --   --   --    --    AST 1,153*  --   --   --   --    MG 2.6*   < > 2.8* 2.9* 2.2   PHOS 6.4*   < > 3.1 3.3 3.1    < > = values in this interval not displayed.       Recent Lab Results  (Last 5 results in the past 24 hours)        10/01/24  0011   09/30/24  2325   09/30/24  1549   09/30/24  1120   09/30/24  0811        Anion Gap 10     8     9       BUN 38     41     43       BUN/CREAT RATIO 39     34     32       Calcium 7.4     8.4     8.4       Chloride 102     96     96       CO2 27     32     31       Creatinine 0.98     1.21     1.33       eGFR 55     43     38       Glucose 141     144     129       Magnesium  2.2     2.9     2.8       Phosphorus Level 3.1     3.3     3.1       POC Glucose   139     125         Potassium 3.1     3.7     3.7       Sodium 136     132     132                              Significant Imaging:  I have reviewed all pertinent imaging results/findings within the past 24 hours.

## 2024-10-01 NOTE — PLAN OF CARE
Choice obtained for [facility] for rehab when pt is medically ready for d/c. SS following.

## 2024-10-01 NOTE — ASSESSMENT & PLAN NOTE
Currently improving on vanc and Zosyn has Gram-negative rods growing in the urine otherwise no positive blood cultures.  Continues on Levophed at 0.1 microgram/kilogram per minute wean that as possible review volume status

## 2024-10-01 NOTE — ASSESSMENT & PLAN NOTE
Currently unresponsive do not know if this is due to hypoxic encephalopathy were medicines were going to begin weaning sedation

## 2024-10-02 NOTE — PROGRESS NOTES
[facility]  Critical Care Medicine  Progress Note    Patient Name: Purnima Cardona  MRN: [id number]  Admission Date: 9/29/2024  Hospital Length of Stay: 3 days  Code Status: Full Code  Attending Provider: Arnulfo Interiano MD  Primary Care Provider: Candida Slaughter FNP   Principal Problem: Cardiorespiratory arrest    Subjective:     HPI:  89 yo F with HTN and GERD presented from [facility] assistant living with AMS with EMS team concern for a facial droop with ED course notable for a nonresponsive patient in asystole with initiation of CPR and admission to ICU thereafter for evaluation and management of cardiac arrest.      Discussed with family at various points over the course of the day; she moved to the assisted living facility after a fall where she had a right sided arm dislocation that was treated.  Since then, she was largely been independent and participates with care; in fact, ADLs include walking, toileting and eating.  In the past few weeks, she has had decreased walking which they attributed to the weather where she complained of it being too hot.  Review of ED records with patient described as having a change in mental status within less than 24 hours.  ED course with the patient having CPR and [place].  She was noted to have VFib for which he received amiodarone as 1 of the arrhythmias during CPR.  Post thoracic EKG with first-degree AV block which is chronic for her.  Her troponin was unremarkable.  My assessment with the patient not following commands and intermittently withdrawing to pain.  Notably, ED course with notification that her oxygen had dropped to 90 despite being on 100% which was intervened on with 100% FiO2.  Early ICU course with patient having difficulty to oxygenate requiring a high PEEP/high FiO2 protocol.  Hysteresis curves with no over distention.  Workup included CTA head and neck to assess for facial droop complaints with no evidence of CVA, however, CTA  neck contrast timing was inadequate.  She was found to have a right middle lobe consolidation and a left-sided pneumothorax.  Upon transferred to the ICU following this imaging, her pneumothorax demonstrated evidence of progression to tension which warranted placement of a left pigtail catheter which was successful.  She is now undergoing a cooling protocol for management of her post arrest course along with treatment of septic shock with a pulmonary source of infection.  Family was updated over the course of the day and notified that neuro prognostication will be undertaken 48 hours from now.    Hospital/ICU Course:  No notes on file    Interval History/Significant Events:  Patient without complaints    Review of Systems  Objective:     Vital Signs (Most Recent):  Temp: 97.9 °F (36.6 °C) (10/02/24 0530)  Pulse: 68 (10/02/24 0530)  Resp: 15 (10/02/24 0530)  BP: (!) 147/58 (10/02/24 0530)  SpO2: 100 % (10/02/24 0530) Vital Signs (24h Range):  Temp:  [85.1 °F (29.5 °C)-98.8 °F (37.1 °C)] 97.9 °F (36.6 °C)  Pulse:  [56-90] 68  Resp:  [9-20] 15  SpO2:  [95 %-100 %] 100 %  BP: (110-181)/() 147/58   Weight: 84.4 kg (186 lb 1.1 oz)  Body mass index is 36.34 kg/m².      Intake/Output Summary (Last 24 hours) at 10/2/2024 0602  Last data filed at 10/2/2024 0436  Gross per 24 hour   Intake 634.73 ml   Output 1070 ml   Net -435.27 ml          Physical Exam  Vitals reviewed.   Constitutional:       Appearance: Normal appearance.      Interventions: She is not intubated.  HENT:      Head: Normocephalic and atraumatic.      Nose: Nose normal.      Mouth/Throat:      Mouth: Mucous membranes are dry.      Pharynx: Oropharynx is clear.   Eyes:      Extraocular Movements: Extraocular movements intact.      Conjunctiva/sclera: Conjunctivae normal.      Pupils: Pupils are equal, round, and reactive to light.   Cardiovascular:      Rate and Rhythm: Normal rate.      Heart sounds: Normal heart sounds. No murmur heard.  Pulmonary:       Effort: Pulmonary effort is normal. She is not intubated.      Breath sounds: Normal breath sounds.   Abdominal:      General: Abdomen is flat. Bowel sounds are normal.      Palpations: Abdomen is soft.   Musculoskeletal:         General: Normal range of motion.      Cervical back: Normal range of motion and neck supple.      Right lower leg: No edema.      Left lower leg: No edema.   Skin:     General: Skin is warm and dry.      Capillary Refill: Capillary refill takes less than 2 seconds.   Neurological:      General: No focal deficit present.      Mental Status: She is alert and oriented to person, place, and time.   Psychiatric:         Mood and Affect: Mood normal.         Behavior: Behavior normal.            Vents:  Vent Mode: A/CMV-VC (10/02/24 0500)  Ventilator Initiated: Yes (09/29/24 0700)  Set Rate: 15 BPM (10/02/24 0500)  Vt Set: 450 mL (10/02/24 0500)  PEEP/CPAP: 8 cmH20 (10/02/24 0500)  Oxygen Concentration (%): 40 (10/02/24 0500)  Peak Airway Pressure: 28.3 cmH20 (10/02/24 0500)  Plateau Pressure: 23.2 cmH20 (10/02/24 0500)  Total Ve: 6.7 L/m (10/02/24 0500)  F/VT Ratio<105 (RSBI): (!) 33.41 (10/02/24 0500)  Lines/Drains/Airways       Central Venous Catheter Line  Duration             Percutaneous Central Line - Triple Lumen  09/29/24 1435 Internal Jugular Left 2 days              Drain  Duration                  Chest Tube 09/29/24 1355 Tube - 1 Left Midaxillary 14 Fr. 2 days         NG/OG Tube 09/29/24 0650 Center mouth 2 days         Urethral Catheter 09/29/24 0720 Non-latex 2 days              Airway  Duration                  Airway - Non-Surgical 09/29/24 0640 Endotracheal Tube 2 days              Peripheral Intravenous Line  Duration                  Peripheral IV - Single Lumen 09/29/24 0620 20 G Left;Posterior Hand 2 days         Peripheral IV - Single Lumen 09/29/24 0635 20 G Distal;Posterior;Right Forearm 2 days                  Significant Labs:    CBC/Anemia Profile:  Recent Labs   Lab  10/01/24  0642   WBC 11.49*   HGB 9.6*   HCT 30.0*      MCV 92.0   RDW 14.0        Chemistries:  Recent Labs   Lab 09/30/24  1549 10/01/24  0011 10/01/24  0849   * 136 135*   K 3.7 3.1* 3.6   CL 96* 102 98   CO2 32 27 30   BUN 41* 38* 36*   CREATININE 1.21* 0.98 1.00   CALCIUM 8.4* 7.4* 7.9*   MG 2.9* 2.2 2.7*   PHOS 3.3 3.1 3.2       Recent Lab Results  (Last 5 results in the past 24 hours)        10/02/24  0523   10/01/24  2329   10/01/24  0849   10/01/24  0814   10/01/24  0642        Anion Gap     11           Baso #         0.02       Basophil %         0.2       BUN     36           BUN/CREAT RATIO     36           Calcium     7.9           Chloride     98           CO2     30           Creatinine     1.00           Differential Method         Auto       eGFR     54           Eos #         0.05       Eos %         0.4       Glucose     132           Hematocrit         30.0       Hemoglobin         9.6       Immature Grans (Abs)         0.04       Immature Granulocytes         0.3       INR         1.47       Lymph #         1.69       Lymph %         14.7       Magnesium      2.7           MCH         29.4       MCHC         32.0       MCV         92.0       Mono #         0.64       Mono %         5.6       MPV         11.6       Neutrophils, Abs         9.05       Neutrophils Relative         78.8       nRBC         0.0       NUCLEATED RBC ABSOLUTE         0.00       Phosphorus Level     3.2           Platelet Count         154       POC Base Excess       6.6         POC Glucose 168   140             POC HCO3       31.3         POC PCO2       44         POC PH       7.46         POC PO2       89         POC SATURATED O2       97         Potassium     3.6           PT         17.7       RBC         3.26       RDW         14.0       Sodium     135           Vancomycin-Trough         6.4       WBC         11.49                              Significant Imaging:  I have reviewed all pertinent imaging  results/findings within the past 24 hours.    ABG  Recent Labs   Lab 10/01/24  0814   PH 7.46*   PO2 89   PCO2 44   HCO3 31.3*     Assessment/Plan:     Neuro  Encephalopathy, toxic  Off sedation for 24 hours no improving    Sedated due to medication  Medicines have been removed for sedation little more active    Pulmonary  On mechanically assisted ventilation  due to neurologic statusContinue current ventilator settings    Pneumonia of right lower lobe due to infectious organism  Staph and strep better your respiratory cultures continue antibiotics order chest x-ray for tomorrow    Pneumothorax, left  Will continue chest tube until extubated    Cardiac/Vascular  * Cardiorespiratory arrest  Patient was status post targeted temperature management seems to be a little bit more awake.  But is not following commands when you agitated her eyes deviated either up to the left or to the right were going to proceed EEG in repeat a CT today will discuss the findings with family    Renal/  Hypokalemia  Supplement    JUDITH (acute kidney injury)  Urine output adequate patient creatinine down to 1 lab pending today discussion about possible external Valdes    ID  Septic shock  Patient has positive urine culture for E coli and respiratory culture for staph and strep currently getting vanc and ceftriaxone I would continue those medicines want to wait and see what the sensitivities to the 2 organisms what in the patient was lung are prior to stopping vancomycin.  Shock has resolved    Endocrine  Stress hyperglycemia  Shares remain at goal    Orthopedic  Dislocation of right shoulder joint  X-ray shows chronic right shoulder dislocation awaiting ortho consult       Critical Care Daily Checklist:    A: Awake: RASS Goal/Actual Goal: RASS Goal: -3-->moderate sedation  Actual:     B: Spontaneous Breathing Trial Performed?     C: SAT & SBT Coordinated?  Yes                      D: Delirium: CAM-ICU     E: Early Mobility Performed? Yes   F:  Feeding Goal: Goals: Enteral feeding tolerance at goal, weight maintenance during admission  Status: Nutrition Goal Status: new   Current Diet Order   No orders of the defined types were placed in this encounter.      AS: Analgesia/Sedation No sedation   T: Thromboembolic Prophylaxis SCD hose   H: HOB > 300 Yes   U: Stress Ulcer Prophylaxis (if needed) Protonix   G: Glucose Control Goal   B: Bowel Function     I: Indwelling Catheter (Lines & Valdes) Necessity Central line day for Valdes consider changing to external Valdes   D: De-escalation of Antimicrobials/Pharmacotherapies Not appropriate    Plan for the day/ETD CT EEG    Code Status:  Family/Goals of Care: Full Code  Discuss with family     Critical Care Time:  35 minutes  Critical secondary to Patient has a condition that poses threat to life and bodily function:  Status post cardiopulmonary arrest with toxic encephalopathy mechanical ventilation shock is improved      Critical care was time spent personally by me on the following activities: development of treatment plan with patient or surrogate and bedside caregivers, discussions with consultants, evaluation of patient's response to treatment, examination of patient, ordering and performing treatments and interventions, ordering and review of laboratory studies, ordering and review of radiographic studies, pulse oximetry, re-evaluation of patient's condition. This critical care time did not overlap with that of any other provider or involve time for any procedures.     Arnulfo Interiano MD  Critical Care Medicine  [facility]

## 2024-10-02 NOTE — ASSESSMENT & PLAN NOTE
Patient has positive urine culture for E coli and respiratory culture for staph and strep currently getting vanc and ceftriaxone I would continue those medicines want to wait and see what the sensitivities to the 2 organisms what in the patient was lung are prior to stopping vancomycin.  Shock has resolved

## 2024-10-02 NOTE — NURSING
Marlee contacted  Referral [phone]  Talked to Kamini  Call back at cardiac time of death for tissue evaluation  Not eligible organ donation

## 2024-10-02 NOTE — ASSESSMENT & PLAN NOTE
Staph and strep better your respiratory cultures continue antibiotics order chest x-ray for tomorrow

## 2024-10-02 NOTE — PROCEDURES
EEG    Date/Time: 10/2/2024 3:53 PM    Performed by: Asim Kramer MD  Authorized by: Arnulfo Interiano MD      ELECTROENCEPHALOGRAM REPORT    DATE OF SERVICE: 10/02/2024  EEG NUMBER: [id number]  LOCATION OF SERVICE: [facility]   Electroencephalographic (EEG) recording is with electrodes placed according to the International 10-20 placement system.  Thirty two (32) channels of digital signal (sampling rate of 512/sec) including T1 and T2 was simultaneously recorded from the scalp and may include  EKG, EMG, and/or eye monitors.  Recording band pass was 0.1 to 512 hz.  Digital video recording of the patient is simultaneously recorded with the EEG.  The patient is instructed report clinical symptoms which may occur during the recording session.  EEG and video recording is stored and archived in digital format. Activation procedures which include photic stimulation, hyperventilation and instructing patients to perform simple task are done in selected patients.    The EEG is displayed on a monitor screen and can be reviewed using different montages.  Computer assisted analysis is employed to detect spike and electrographic seizure activity.   The entire record is submitted for computer analysis.  The entire recording is visually reviewed and the times identified by computer analysis as being spikes or seizures are reviewed again.  Compresses spectral analysis (CSA) is also performed on the activity recorded from each individual channel.  This is displayed as a power display of frequencies from 0 to 30 Hz over time.   The CSA is reviewed looking for asymmetries in power between homologous areas of the scalp and then compared with the original EEG recording.     Cureatr software was also utilized in the review of this study.  This software suite analyzes the EEG recording in multiple domains.  Coherence and rhythmicity is computed to identify EEG sections which may contain organized seizures.  Each  channel undergoes analysis to detect presence of spike and sharp waves which have special and morphological characteristic of epileptic activity.  The routine EEG recording is converted from spacial into frequency domain.  This is then displayed comparing homologous areas to identify areas of significant asymmetry.  Algorithm to identify non-cortically generated artifact is used to separate eye movement, EMG and other artifact from the EEG    EEG FINDINGS  During the maximally alert state, no posterior dominant rhythm was seen. The background consisted of continuous, generalized, low voltage( <20 uV), nonreactive 2-3 Hz delta activity with intermittent generalized 4-5 Hz theta activity. Sleep architecture was not observed.    No epileptiform findings were noted, no electrographic seizures were seen, and no clinical events were reported.  F3 electrode pop artifact was noted during the record.    Activation Procedures was not performed    IMPRESSION:  Abnormal  Continuous nonreactive delta activity, generalized  Intermittent nonreactive theta activity, generalized    CLINICAL CORRELATION:  This is an abnormal EEG  The diffuse slowing is indicative of non-specific severe global cerebral dysfunction consistent with toxic metabolic etiology. There were no epileptiform findings, electrographic seizures, or no clinical events recorded.     Asim Kramer MD  Neurology

## 2024-10-02 NOTE — ASSESSMENT & PLAN NOTE
Patient was status post targeted temperature management seems to be a little bit more awake.  But is not following commands when you agitated her eyes deviated either up to the left or to the right were going to proceed EEG in repeat a CT today will discuss the findings with family

## 2024-10-02 NOTE — NURSING
Dr. Interiano at bedside and spoke to son Kip about current patient status.    11:15 pt son spoke to other siblings and they are in agreeance to continue current care, but make code status DNR. Dr. Interiano aware.

## 2024-10-02 NOTE — PLAN OF CARE
Problem: Infection  Goal: Absence of Infection Signs and Symptoms  Intervention: Prevent or Manage Infection  Flowsheets (Taken 10/2/2024 0534)  Fever Reduction/Comfort Measures:   lightweight bedding   lightweight clothing  Infection Management: aseptic technique maintained  Isolation Precautions: precautions maintained     Problem: Mechanical Ventilation Invasive  Goal: Optimal Nutrition Delivery  Intervention: Optimize Nutrition Delivery  Flowsheets (Taken 10/2/2024 0534)  Nutrition Support Management: tube feeding rate increased

## 2024-10-02 NOTE — PROGRESS NOTES
[facility]  Wound Care    Patient Name:  Purnima Cardona   MRN:  [id number]  Date: 10/2/2024  Diagnosis: Cardiorespiratory arrest    History:     Past Medical History:   Diagnosis Date    Abnormal EKG     Essential hypertension     Hyperlipidemia     Shortness of breath        Social History     Socioeconomic History    Marital status:    Tobacco Use    Smoking status: Never     Passive exposure: Never    Smokeless tobacco: Never   Substance and Sexual Activity    Alcohol use: Never    Drug use: Never    Sexual activity: Not Currently     Social Drivers of Health     Financial Resource Strain: Low Risk  (9/30/2024)    Overall Financial Resource Strain (CARDIA)     Difficulty of Paying Living Expenses: Not hard at all   Food Insecurity: No Food Insecurity (9/30/2024)    Hunger Vital Sign     Worried About Running Out of Food in the Last Year: Never true     Ran Out of Food in the Last Year: Never true   Transportation Needs: No Transportation Needs (9/30/2024)    TRANSPORTATION NEEDS     Transportation : No   Physical Activity: Inactive (9/30/2024)    Exercise Vital Sign     Days of Exercise per Week: 0 days     Minutes of Exercise per Session: 0 min   Stress: No Stress Concern Present (9/30/2024)    [employer] [employer] - Occupational Stress Questionnaire     Feeling of Stress : Not at all   Housing Stability: Low Risk  (9/30/2024)    Housing Stability Vital Sign     Unable to Pay for Housing in the Last Year: No     Homeless in the Last Year: No       Precautions:     Allergies as of 09/29/2024 - Reviewed 09/29/2024   Allergen Reaction Noted    Clonidine  03/19/2021    Hiprex [methenamine hippurate]  03/19/2021    Sulfa (sulfonamide antibiotics)  03/19/2021       WOC Assessment Details/Treatment     Narrative: Seen patient for initial preventative skin care measures.  Foam borders noted to bilateral heels and sacral, no redness or open wounds noted.  Consult wound care  of any findings.      10/02/2024

## 2024-10-02 NOTE — SUBJECTIVE & OBJECTIVE
Interval History/Significant Events:  Patient without complaints    Review of Systems  Objective:     Vital Signs (Most Recent):  Temp: 97.9 °F (36.6 °C) (10/02/24 0530)  Pulse: 68 (10/02/24 0530)  Resp: 15 (10/02/24 0530)  BP: (!) 147/58 (10/02/24 0530)  SpO2: 100 % (10/02/24 0530) Vital Signs (24h Range):  Temp:  [85.1 °F (29.5 °C)-98.8 °F (37.1 °C)] 97.9 °F (36.6 °C)  Pulse:  [56-90] 68  Resp:  [9-20] 15  SpO2:  [95 %-100 %] 100 %  BP: (110-181)/() 147/58   Weight: 84.4 kg (186 lb 1.1 oz)  Body mass index is 36.34 kg/m².      Intake/Output Summary (Last 24 hours) at 10/2/2024 0602  Last data filed at 10/2/2024 0436  Gross per 24 hour   Intake 634.73 ml   Output 1070 ml   Net -435.27 ml          Physical Exam  Vitals reviewed.   Constitutional:       Appearance: Normal appearance.      Interventions: She is not intubated.  HENT:      Head: Normocephalic and atraumatic.      Nose: Nose normal.      Mouth/Throat:      Mouth: Mucous membranes are dry.      Pharynx: Oropharynx is clear.   Eyes:      Extraocular Movements: Extraocular movements intact.      Conjunctiva/sclera: Conjunctivae normal.      Pupils: Pupils are equal, round, and reactive to light.   Cardiovascular:      Rate and Rhythm: Normal rate.      Heart sounds: Normal heart sounds. No murmur heard.  Pulmonary:      Effort: Pulmonary effort is normal. She is not intubated.      Breath sounds: Normal breath sounds.   Abdominal:      General: Abdomen is flat. Bowel sounds are normal.      Palpations: Abdomen is soft.   Musculoskeletal:         General: Normal range of motion.      Cervical back: Normal range of motion and neck supple.      Right lower leg: No edema.      Left lower leg: No edema.   Skin:     General: Skin is warm and dry.      Capillary Refill: Capillary refill takes less than 2 seconds.   Neurological:      General: No focal deficit present.      Mental Status: She is alert and oriented to person, place, and time.   Psychiatric:          Mood and Affect: Mood normal.         Behavior: Behavior normal.            Vents:  Vent Mode: A/CMV-VC (10/02/24 0500)  Ventilator Initiated: Yes (09/29/24 0700)  Set Rate: 15 BPM (10/02/24 0500)  Vt Set: 450 mL (10/02/24 0500)  PEEP/CPAP: 8 cmH20 (10/02/24 0500)  Oxygen Concentration (%): 40 (10/02/24 0500)  Peak Airway Pressure: 28.3 cmH20 (10/02/24 0500)  Plateau Pressure: 23.2 cmH20 (10/02/24 0500)  Total Ve: 6.7 L/m (10/02/24 0500)  F/VT Ratio<105 (RSBI): (!) 33.41 (10/02/24 0500)  Lines/Drains/Airways       Central Venous Catheter Line  Duration             Percutaneous Central Line - Triple Lumen  09/29/24 1435 Internal Jugular Left 2 days              Drain  Duration                  Chest Tube 09/29/24 1355 Tube - 1 Left Midaxillary 14 Fr. 2 days         NG/OG Tube 09/29/24 0650 Center mouth 2 days         Urethral Catheter 09/29/24 0720 Non-latex 2 days              Airway  Duration                  Airway - Non-Surgical 09/29/24 0640 Endotracheal Tube 2 days              Peripheral Intravenous Line  Duration                  Peripheral IV - Single Lumen 09/29/24 0620 20 G Left;Posterior Hand 2 days         Peripheral IV - Single Lumen 09/29/24 0635 20 G Distal;Posterior;Right Forearm 2 days                  Significant Labs:    CBC/Anemia Profile:  Recent Labs   Lab 10/01/24  0642   WBC 11.49*   HGB 9.6*   HCT 30.0*      MCV 92.0   RDW 14.0        Chemistries:  Recent Labs   Lab 09/30/24  1549 10/01/24  0011 10/01/24  0849   * 136 135*   K 3.7 3.1* 3.6   CL 96* 102 98   CO2 32 27 30   BUN 41* 38* 36*   CREATININE 1.21* 0.98 1.00   CALCIUM 8.4* 7.4* 7.9*   MG 2.9* 2.2 2.7*   PHOS 3.3 3.1 3.2       Recent Lab Results  (Last 5 results in the past 24 hours)        10/02/24  0523   10/01/24  2329   10/01/24  0849   10/01/24  0814   10/01/24  0642        Anion Gap     11           Baso #         0.02       Basophil %         0.2       BUN     36           BUN/CREAT RATIO     36            Calcium     7.9           Chloride     98           CO2     30           Creatinine     1.00           Differential Method         Auto       eGFR     54           Eos #         0.05       Eos %         0.4       Glucose     132           Hematocrit         30.0       Hemoglobin         9.6       Immature Grans (Abs)         0.04       Immature Granulocytes         0.3       INR         1.47       Lymph #         1.69       Lymph %         14.7       Magnesium      2.7           MCH         29.4       MCHC         32.0       MCV         92.0       Mono #         0.64       Mono %         5.6       MPV         11.6       Neutrophils, Abs         9.05       Neutrophils Relative         78.8       nRBC         0.0       NUCLEATED RBC ABSOLUTE         0.00       Phosphorus Level     3.2           Platelet Count         154       POC Base Excess       6.6         POC Glucose 168   140             POC HCO3       31.3         POC PCO2       44         POC PH       7.46         POC PO2       89         POC SATURATED O2       97         Potassium     3.6           PT         17.7       RBC         3.26       RDW         14.0       Sodium     135           Vancomycin-Trough         6.4       WBC         11.49                              Significant Imaging:  I have reviewed all pertinent imaging results/findings within the past 24 hours.

## 2024-10-02 NOTE — ASSESSMENT & PLAN NOTE
Urine output adequate patient creatinine down to 1 lab pending today discussion about possible external [place]

## 2024-10-03 PROBLEM — E83.39 HYPOPHOSPHATEMIA: Status: ACTIVE | Noted: 2024-01-01

## 2024-10-03 NOTE — CARE UPDATE
10/03/24 1525   Patient Assessment/Suction   Level of Consciousness (AVPU) unresponsive   Respiratory Effort Unlabored   Expansion/Accessory Muscles/Retractions no retractions;no use of accessory muscles   All Lung Fields Breath Sounds Anterior:;coarse   Rhythm/Pattern, Respiratory assisted mechanically   Cough Frequency with stimulation   Cough Type assisted   Suction Method oral   $ Suction Charges Inline Suction Procedure Stat Charge   Secretions Amount small   Secretions Color white   Secretions Characteristics thin   Skin Integrity   $ Wound Care Tech Time 15 min   Area Observed Left;Right;Cheek   Skin Appearance without discoloration   Barrier used?   (commercial tube balbuena)   PRE-TX-O2   Device (Oxygen Therapy) ventilator   Oxygen Concentration (%) 40   Vent Select   Charged w/in last 24h YES   Preset Conventional Ventilator Settings   Vent ID [id number]   Vent Type [id number]   Ventilation Type VC   Vent Mode A/CMV-VC   Humidity HME   Set Rate 15 BPM   Vt Set 450 mL   PEEP/CPAP 8 cmH20   Waveform RAMP   Insp Time 1 Sec(s)   I-Trigger Type  Flow   Trigger Sensitivity Flow/I-Trigger 2 L/min   Patient Ventilator Parameters   Resp Rate Total 15 br/min   Peak Airway Pressure 28.1 cmH20   Mean Airway Pressure 13.7 cmH20   Plateau Pressure 23.6 cmH20   Exhaled Vt 450 mL   Total Ve 6.75 L/m   I:E Ratio Measured 1:3.0   Tubing ID (mm) 7 mm   Tube Type ET   Conventional Ventilator Alarms   Alarms On Y   Ve High Alarm 30 L/min   Ve Low Alarm 2 L/min   Vt High Alarm 15 mL   Vt Low Alarm 1 mL   Resp Rate High Alarm 40 br/min   Press High Alarm 60 cmH2O   Delay Alarm (Sec) 20 sec(s)   Ready to Wean/Extubation Screen   FIO2<=50 (chart decimal) 0.4   MV<16L (chart vol.) 6.75   PEEP <=8 (chart #) 8     Done by RT student Callie Wagner

## 2024-10-03 NOTE — PLAN OF CARE
Problem: Adult Inpatient Plan of Care  Goal: Optimal Comfort and Wellbeing  Outcome: Progressing     Problem: Infection  Goal: Absence of Infection Signs and Symptoms  Outcome: Progressing     Problem: Skin Injury Risk Increased  Goal: Skin Health and Integrity  Outcome: Progressing     Problem: Mechanical Ventilation Invasive  Goal: Optimal Nutrition Delivery  Outcome: Progressing     Problem: Fall Injury Risk  Goal: Absence of Fall and Fall-Related Injury  Outcome: Progressing     Problem: Sepsis/Septic Shock  Goal: Optimal Nutrition Intake  Outcome: Progressing

## 2024-10-03 NOTE — ASSESSMENT & PLAN NOTE
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

## 2024-10-03 NOTE — PROGRESS NOTES
[facility]  Adult Nutrition  Consult Note         Reason for Assessment  Reason For Assessment: RD follow-up   Nutrition Risk Screen: tube feeding or parenteral nutrition    Assessment and Plan    10/3/2024: RD follow up. Blood glucose has been running high requiring coverage per nursing. Recommend change formula to Glucerna 1.5 at 50mL/hour with 40mL/hour free water flush. Current weight 83.5kg. Phos low. Tube feeding at goal providing approximately 960mg/24 hours. RD following.     10/1/2024: Consult received and appreciated. Consult for enteral feeding recommendations. Patient is a 89yo female admitted 9/29 for cardiorespiratory arrest.     Patient is 87.6kg with a BMI of 37.72 and is obese. Recommend start Isosource 1.5 with a goal rate of 50mL/hour with 40mL/hour free water flush. Keep HOB at 30-45 degrees to reduce risk of aspiration. Start rate at 20mL/hour and advance by 10mL q8h until goal rate is reached. Monitor for tolerance: n/v/d/c. Hold feeding and notify RD if symptoms of intolerance develop.     Last BM 9/29 per flowsheet.    Medications/labs reviewed. RD following.        Learning Needs/Social Determinants of Health    Learning Assessment       09/29/2024 1520 [facility] (9/29/2024 - Present)   Created by Ankita Skelton, RN - RN (Nurse) Status: Complete                 PRIMARY LEARNER     Primary Learner Name:  Meg EL - 09/29/2024 1520    Relationship:  Family EL - 09/29/2024 1520    Does the primary learner have any barriers to learning?:  No Barriers EL - 09/29/2024 1520    What is the preferred language of the primary learner?:  English EL - 09/29/2024 1520    Is an  required?:  No EL - 09/29/2024 1520    How does the primary learner prefer to learn new concepts?:  Listening EL - 09/29/2024 1520    How often do you need to have someone help you read instructions, pamphlets, or written material from your doctor or pharmacy?:  Never EL -  09/29/2024 1520        CO-LEARNER #1     No question answered        CO-LEARNER #2     No question answered        SPECIAL TOPICS     No question answered        ANSWERED BY:     No question answered        Comments         Edit History       Anktia Skelton, RN - RN (Nurse)   09/29/2024 1520                           Social Drivers of Health     Tobacco Use: Low Risk  (9/29/2024)    Patient History     Smoking Tobacco Use: Never     Smokeless Tobacco Use: Never     Passive Exposure: Never   Alcohol Use: Not At Risk (9/30/2024)    AUDIT-C     Frequency of Alcohol Consumption: Never     Average Number of Drinks: Patient does not drink     Frequency of Binge Drinking: Never   Financial Resource Strain: Low Risk  (9/30/2024)    Overall Financial Resource Strain (CARDIA)     Difficulty of Paying Living Expenses: Not hard at all   Food Insecurity: No Food Insecurity (9/30/2024)    Hunger Vital Sign     Worried About Running Out of Food in the Last Year: Never true     Ran Out of Food in the Last Year: Never true   Transportation Needs: No Transportation Needs (9/30/2024)    TRANSPORTATION NEEDS     Transportation : No   Physical Activity: Inactive (9/30/2024)    Exercise Vital Sign     Days of Exercise per Week: 0 days     Minutes of Exercise per Session: 0 min   Stress: No Stress Concern Present (9/30/2024)    [employer] [employer] - Occupational Stress Questionnaire     Feeling of Stress : Not at all   Housing Stability: Low Risk  (9/30/2024)    Housing Stability Vital Sign     Unable to Pay for Housing in the Last Year: No     Homeless in the Last Year: No   Depression: High Risk (4/2/2024)    Depression     Last PHQ-4: Flowsheet Data: 10   Utilities: Not At Risk (9/30/2024)    [facility] Utilities     Threatened with loss of utilities: No   Health Literacy: Adequate Health Literacy (9/30/2024)     Health Literacy     Frequency of need for help with medical instructions: Rarely   Social Isolation:  Socially Integrated (9/30/2024)    Social Isolation     Social Isolation: 1          Malnutrition  Is Patient Malnourished: No    Nutrition Diagnosis  Inadequate energy intake related to Decreased ability to consume sufficient energy as evidenced by intubated  Comments: initiate enteral feeds    Recent Labs   Lab 10/03/24  0521 10/03/24  0539   GLU  --  185*   POCGLU 204*  --      Comments on Glucose: Glucose elevated. No PMH DM2. Stress induced hyperglycemia    Nutrition Prescription / Recommendations  Recommendation/Intervention: Recommend change enteral formula to Glucerna 1. 5 at 50mL/hour with 40mL/hour free water flush.  Goals: Enteral feeding tolerance at goal, weight maintenance during admission  Nutrition Goal Status: new  Current Diet Order: NPO  Chewing or Swallowing Difficulty?: No Chewing or swallowing difficulty  Recommended Diet: Enteral Nutrition  Recommended Oral Supplement: No Oral Supplements  Is Nutrition Support Recommended:   Needs Calculated    Energy Calorie Requirements (kcal): 1752-2190kcal (20-25kcal/kg)  Protein Requirements: 88-105g (1.0-1.2g/kg)  Enteral Nutrition   Enteral Nutrition Formula Provides:  1800 kcals Propofol Rate: No  99 g Protein  160 g Carbohydrates  90 g Fat Propofol Rate: No  910 ml Fluid without Flush    960 ml Fluid by flush   1870 ml Total Fluid  Enteral Nutrition Recommended Order:  Tube feeding via NG/ Summers Sump  Tube feeding formula: Diabetisource AC NG/ Summers Sump at 50mL/hour (May sub Glucerna 1.5)  Free Water Flush: 40 ml hourly  Modular Supplements:No Modular Supplements needed  Enteral Nutrition meets needs?: yes  Enteral Nutrition Status: New Order  Is Nutrition Education Recommended: No    Monitor and Evaluation  % current Intake: NPO  % intake to meet estimated needs: Enteral Nutrition   Food and Nutrient Intake: enteral nutrition intake  Food and Nutrient Adminstration: enteral and parenteral nutrition administration  Anthropometric Measurements:  weight, weight change  Biochemical Data, Medical Tests and Procedures: electrolyte and renal panel, gastrointestinal profile, glucose/endocrine profile, inflammatory profile, lipid profile       Current Medical Diagnosis and Past Medical History     Past Medical History:   Diagnosis Date    Abnormal EKG     Essential hypertension     Hyperlipidemia     Shortness of breath        Nutrition/Diet History       Lab/Procedures/Meds  Recent Labs   Lab 10/03/24  0539   *   K 4.8   BUN 26*   CREATININE 0.70   CALCIUM 8.9   CL 96*   PHOS 1.4*   Note: Na+ low. BUN elevated. Cl- low. Phos low      Last A1c:   Lab Results   Component Value Date    HGBA1C 6.4 03/08/2024     Lab Results   Component Value Date    RBC 3.19 (L) 10/02/2024    HGB 9.4 (L) 10/02/2024    HCT 29.3 (L) 10/02/2024    MCV 91.8 10/02/2024    MCH 29.5 10/02/2024    MCHC 32.1 10/02/2024   Note: H&H low    Pertinent Labs Reviewed: reviewed  Pertinent Medications Reviewed: reviewed  Scheduled Meds:   cefTRIAXone (Rocephin) IV (PEDS and ADULTS)  1 g Intravenous Q24H    chlorhexidine  15 mL Mouth/Throat BID    enoxparin  40 mg Subcutaneous Q24H (prophylaxis, 1700)    insulin glargine U-100  10 Units Subcutaneous QHS    mupirocin   Nasal BID    pantoprazole  40 mg Intravenous Daily    vancomycin (VANCOCIN) IV (PEDS and ADULTS)  1,500 mg Intravenous Q24H     Continuous Infusions:      PRN Meds:.  Current Facility-Administered Medications:     0.9% NaCl, , Intravenous, PRN    dextrose 10%, 12.5 g, Intravenous, PRN    dextrose 10%, 25 g, Intravenous, PRN    glucagon (human recombinant), 1 mg, Intramuscular, PRN    insulin aspart U-100, 0-10 Units, Subcutaneous, Q6H PRN    ondansetron, 4 mg, Intravenous, Q8H PRN    Pharmacy to dose Vancomycin consult, , , Once **AND** vancomycin - pharmacy to dose, , Intravenous, pharmacy to manage frequency    Anthropometrics  Temp: 99.3 °F (37.4 °C)  Height Method: Stated  Height: 5' (152.4 cm)  Height (inches): 60 in  Weight  Method: Bed Scale  Weight: 83.5 kg (184 lb 1.4 oz)  Weight (lb): 184.09 lb  Ideal Body Weight (IBW), Female: 100 lb  % Ideal Body Weight, Female (lb): 143.08 %  BMI (Calculated): 36       Estimated/Assessed Needs  RMR ([place]-St. Jeor Equation): 1176.5   Total Ve: 6.69 L/m Temp: 99.3 °F (37.4 °C)Core Esophageal  Weight Used For Calorie Calculations: 87.6 kg (193 lb 2 oz)     Energy Calorie Requirements (kcal): 1752-2190kcal (20-25kcal/kg)  Weight Used For Protein Calculations: 87.6 kg (193 lb 2 oz)  Protein Requirements: 88-105g (1.0-1.2g/kg)       RDA Method (mL): 1752       Nutrition by Nursing  Diet/Nutrition Received: tube feeding           Last Bowel Movement: 09/29/24          NG/OG Tube 09/29/24 0650 [place] mouth-Current Rate (mL/hr): 40 mL/hr       NG/OG Tube 09/29/24 0650 Center mouth-Goal Rate (mL/hr): 50 mL/hr       NG/OG Tube 09/29/24 0650 [place] mouth-Formula Name: Isosource 1.5    Nutrition Follow-Up  RD Follow-up?: Yes      Nutrition Discharge Planning: Unsure of discharge plans at this time. Will monitor and assess closer to discharge.          Sania Baxtre MS, RD, LD  Available via Secure Chat

## 2024-10-03 NOTE — PLAN OF CARE
Problem: Adult Inpatient Plan of Care  Goal: Plan of Care Review  Outcome: Progressing  Goal: Patient-Specific Goal (Individualized)  Outcome: Progressing  Goal: Absence of Hospital-Acquired Illness or Injury  Outcome: Progressing  Goal: Optimal Comfort and Wellbeing  Outcome: Progressing  Goal: Readiness for Transition of Care  Outcome: Progressing     Problem: Infection  Goal: Absence of Infection Signs and Symptoms  Outcome: Progressing     Problem: Skin Injury Risk Increased  Goal: Skin Health and Integrity  Outcome: Progressing     Problem: Mechanical Ventilation Invasive  Goal: Effective Communication  Outcome: Progressing  Goal: Optimal Device Function  Outcome: Progressing  Goal: Mechanical Ventilation Liberation  Outcome: Progressing  Goal: Optimal Nutrition Delivery  Outcome: Progressing  Goal: Absence of Device-Related Skin and Tissue Injury  Outcome: Progressing  Goal: Absence of Ventilator-Induced Lung Injury  Outcome: Progressing     Problem: UTI (Urinary Tract Infection)  Goal: Improved Infection Symptoms  Outcome: Progressing     Problem: Fall Injury Risk  Goal: Absence of Fall and Fall-Related Injury  Outcome: Progressing     Problem: Sepsis/Septic Shock  Goal: Optimal Coping  Outcome: Progressing  Goal: Absence of Bleeding  Outcome: Progressing  Goal: Blood Glucose Level Within Targeted Range  Outcome: Progressing  Goal: Absence of Infection Signs and Symptoms  Outcome: Progressing  Goal: Optimal Nutrition Intake  Outcome: Progressing     Problem: Acute Kidney Injury/Impairment  Goal: Fluid and Electrolyte Balance  Outcome: Progressing  Goal: Improved Oral Intake  Outcome: Progressing  Goal: Effective Renal Function  Outcome: Progressing     Problem: Pneumonia  Goal: Fluid Balance  Outcome: Progressing  Goal: Resolution of Infection Signs and Symptoms  Outcome: Progressing  Goal: Effective Oxygenation and Ventilation  Outcome: Progressing

## 2024-10-03 NOTE — PROGRESS NOTES
[facility]  Critical Care Medicine  Progress Note    Patient Name: Purnima Cardona  MRN: [id number]  Admission Date: 9/29/2024  Hospital Length of Stay: 4 days  Code Status: DNR  Attending Provider: Arnulfo Interiano MD  Primary Care Provider: Candida Slaughter FNP   Principal Problem: Cardiorespiratory arrest    Subjective:     HPI:  89 yo F with HTN and GERD presented from [facility] assistant living with AMS with EMS team concern for a facial droop with ED course notable for a nonresponsive patient in asystole with initiation of CPR and admission to ICU thereafter for evaluation and management of cardiac arrest.      Discussed with family at various points over the course of the day; she moved to the assisted living facility after a fall where she had a right sided arm dislocation that was treated.  Since then, she was largely been independent and participates with care; in fact, ADLs include walking, toileting and eating.  In the past few weeks, she has had decreased walking which they attributed to the weather where she complained of it being too hot.  Review of ED records with patient described as having a change in mental status within less than 24 hours.  ED course with the patient having CPR and [place].  She was noted to have VFib for which he received amiodarone as 1 of the arrhythmias during CPR.  Post thoracic EKG with first-degree AV block which is chronic for her.  Her troponin was unremarkable.  My assessment with the patient not following commands and intermittently withdrawing to pain.  Notably, ED course with notification that her oxygen had dropped to 90 despite being on 100% which was intervened on with 100% FiO2.  Early ICU course with patient having difficulty to oxygenate requiring a high PEEP/high FiO2 protocol.  Hysteresis curves with no over distention.  Workup included CTA head and neck to assess for facial droop complaints with no evidence of CVA, however, CTA neck  contrast timing was inadequate.  She was found to have a right middle lobe consolidation and a left-sided pneumothorax.  Upon transferred to the ICU following this imaging, her pneumothorax demonstrated evidence of progression to tension which warranted placement of a left pigtail catheter which was successful.  She is now undergoing a cooling protocol for management of her post arrest course along with treatment of septic shock with a pulmonary source of infection.  Family was updated over the course of the day and notified that neuro prognostication will be undertaken 48 hours from now.    Hospital/ICU Course:  No notes on file    Interval History/Significant Events:  Patient without complaints    Review of Systems  Objective:     Vital Signs (Most Recent):  Temp: 99.3 °F (37.4 °C) (10/03/24 0515)  Pulse: 73 (10/03/24 0515)  Resp: 15 (10/03/24 0515)  BP: (!) 134/54 (10/03/24 0515)  SpO2: 100 % (10/03/24 0515) Vital Signs (24h Range):  Temp:  [97.7 °F (36.5 °C)-100.6 °F (38.1 °C)] 99.3 °F (37.4 °C)  Pulse:  [65-95] 73  Resp:  [10-21] 15  SpO2:  [96 %-100 %] 100 %  BP: ()/(37-91) 134/54   Weight: 83.5 kg (184 lb 1.4 oz)  Body mass index is 35.95 kg/m².      Intake/Output Summary (Last 24 hours) at 10/3/2024 0610  Last data filed at 10/3/2024 0504  Gross per 24 hour   Intake 350.24 ml   Output 843 ml   Net -492.76 ml          Physical Exam  Vitals reviewed.   Constitutional:       Appearance: Normal appearance.      Interventions: She is not intubated.  HENT:      Head: Normocephalic and atraumatic.      Nose: Nose normal.      Mouth/Throat:      Mouth: Mucous membranes are dry.      Pharynx: Oropharynx is clear.   Eyes:      Extraocular Movements: Extraocular movements intact.      Conjunctiva/sclera: Conjunctivae normal.      Pupils: Pupils are equal, round, and reactive to light.   Cardiovascular:      Rate and Rhythm: Normal rate.      Heart sounds: Normal heart sounds. No murmur heard.  Pulmonary:       Effort: Pulmonary effort is normal. She is not intubated.      Breath sounds: Normal breath sounds.   Abdominal:      General: Abdomen is flat. Bowel sounds are normal.      Palpations: Abdomen is soft.   Musculoskeletal:         General: Normal range of motion.      Cervical back: Normal range of motion and neck supple.      Right lower leg: No edema.      Left lower leg: No edema.   Skin:     General: Skin is warm and dry.      Capillary Refill: Capillary refill takes less than 2 seconds.   Neurological:      General: No focal deficit present.      Mental Status: She is alert and oriented to person, place, and time.   Psychiatric:         Mood and Affect: Mood normal.         Behavior: Behavior normal.            Vents:  Vent Mode: A/CMV-VC (10/03/24 0415)  Ventilator Initiated: Yes (09/29/24 0700)  Set Rate: 15 BPM (10/03/24 0415)  Vt Set: 450 mL (10/03/24 0415)  PEEP/CPAP: 8 cmH20 (10/03/24 0415)  Oxygen Concentration (%): 40 (10/03/24 0415)  Peak Airway Pressure: 30 cmH20 (10/03/24 0415)  Plateau Pressure: 21.7 cmH20 (10/02/24 1526)  Total Ve: 6.75 L/m (10/03/24 0415)  F/VT Ratio<105 (RSBI): (!) 31.11 (10/03/24 0415)  Lines/Drains/Airways       Central Venous Catheter Line  Duration             Percutaneous Central Line - Triple Lumen  09/29/24 1435 Internal Jugular Left 3 days              Drain  Duration                  Chest Tube 09/29/24 1355 Tube - 1 Left Midaxillary 14 Fr. 3 days         NG/OG Tube 09/29/24 0650 Center mouth 3 days         Urethral Catheter 09/29/24 0720 Non-latex 3 days              Airway  Duration                  Airway - Non-Surgical 09/29/24 0640 Endotracheal Tube 3 days              Peripheral Intravenous Line  Duration                  Peripheral IV - Single Lumen 09/29/24 0620 20 G Left;Posterior Hand 3 days         Peripheral IV - Single Lumen 09/29/24 0635 20 G Distal;Posterior;Right Forearm 3 days                  Significant Labs:    CBC/Anemia Profile:  Recent Labs   Lab  10/01/24  0642 10/02/24  0555   WBC 11.49* 9.44   HGB 9.6* 9.4*   HCT 30.0* 29.3*    140*   MCV 92.0 91.8   RDW 14.0 13.7        Chemistries:  Recent Labs   Lab 10/01/24  0849 10/02/24  0555   * 139   K 3.6 2.8*   CL 98 107   CO2 30 28   BUN 36* 24*   CREATININE 1.00 0.55   CALCIUM 7.9* 7.5*   MG 2.7* 2.0   PHOS 3.2 1.7*       Recent Lab Results         10/03/24  0521   10/02/24  2334   10/02/24  1850   10/02/24  1121        POC Glucose 204   208   192   184               Significant Imaging:  I have reviewed all pertinent imaging results/findings within the past 24 hours.    ABG  Recent Labs   Lab 10/01/24  0814   PH 7.46*   PO2 89   PCO2 44   HCO3 31.3*     Assessment/Plan:     Neuro  Encephalopathy, toxic  Off sedation for 48 hours no improving believe this is due to hypoxemia    Sedated due to medication  Medicines off for 48 hours some spontaneous movement does not follow commands    Pulmonary  On mechanically assisted ventilation  due to neurologic statusContinue current ventilator settings    Pneumonia of right lower lobe due to infectious organism  Continue antibiotics chest x-ray pending    Pneumothorax, left  Will continue chest tube until extubated    Cardiac/Vascular  * Cardiorespiratory arrest  CT head is unchanged with patient continues to have encephalopathy in his noted on EEG to have toxic encephalopathy I think this is probably related to hypoxic encephalopathy my plan would be to proceed with his this discussion of removal of life support comfort care    Renal/  Hypophosphatemia  Supplement awaiting for today's level    Hypokalemia  Supplement    JUDITH (acute kidney injury)  Urine output is good creatinine down to .55 this has resolved    ID  Septic shock  Patient has positive urine culture for E coli and respiratory culture for staph and strep currently getting vanc and ceftriaxone I would continue those medicines want to wait and see what the sensitivities to the 2 organisms what in  the patient was lung are prior to stopping vancomycin.  Shock has resolved    Endocrine  Stress hyperglycemia  Because his drifting too high    Orthopedic  Dislocation of right shoulder joint  X-ray shows chronic right shoulder dislocation awaiting ortho consult             Arnulfo Interiano MD  Critical Care Medicine  [facility]

## 2024-10-03 NOTE — SUBJECTIVE & OBJECTIVE
Interval History/Significant Events:  Patient without complaints    Review of Systems  Objective:     Vital Signs (Most Recent):  Temp: 99.3 °F (37.4 °C) (10/03/24 0515)  Pulse: 73 (10/03/24 0515)  Resp: 15 (10/03/24 0515)  BP: (!) 134/54 (10/03/24 0515)  SpO2: 100 % (10/03/24 0515) Vital Signs (24h Range):  Temp:  [97.7 °F (36.5 °C)-100.6 °F (38.1 °C)] 99.3 °F (37.4 °C)  Pulse:  [65-95] 73  Resp:  [10-21] 15  SpO2:  [96 %-100 %] 100 %  BP: ()/(37-91) 134/54   Weight: 83.5 kg (184 lb 1.4 oz)  Body mass index is 35.95 kg/m².      Intake/Output Summary (Last 24 hours) at 10/3/2024 0610  Last data filed at 10/3/2024 0504  Gross per 24 hour   Intake 350.24 ml   Output 843 ml   Net -492.76 ml          Physical Exam  Vitals reviewed.   Constitutional:       Appearance: Normal appearance.      Interventions: She is not intubated.  HENT:      Head: Normocephalic and atraumatic.      Nose: Nose normal.      Mouth/Throat:      Mouth: Mucous membranes are dry.      Pharynx: Oropharynx is clear.   Eyes:      Extraocular Movements: Extraocular movements intact.      Conjunctiva/sclera: Conjunctivae normal.      Pupils: Pupils are equal, round, and reactive to light.   Cardiovascular:      Rate and Rhythm: Normal rate.      Heart sounds: Normal heart sounds. No murmur heard.  Pulmonary:      Effort: Pulmonary effort is normal. She is not intubated.      Breath sounds: Normal breath sounds.   Abdominal:      General: Abdomen is flat. Bowel sounds are normal.      Palpations: Abdomen is soft.   Musculoskeletal:         General: Normal range of motion.      Cervical back: Normal range of motion and neck supple.      Right lower leg: No edema.      Left lower leg: No edema.   Skin:     General: Skin is warm and dry.      Capillary Refill: Capillary refill takes less than 2 seconds.   Neurological:      General: No focal deficit present.      Mental Status: She is alert and oriented to person, place, and time.   Psychiatric:          Mood and Affect: Mood normal.         Behavior: Behavior normal.            Vents:  Vent Mode: A/CMV-VC (10/03/24 0415)  Ventilator Initiated: Yes (09/29/24 0700)  Set Rate: 15 BPM (10/03/24 0415)  Vt Set: 450 mL (10/03/24 0415)  PEEP/CPAP: 8 cmH20 (10/03/24 0415)  Oxygen Concentration (%): 40 (10/03/24 0415)  Peak Airway Pressure: 30 cmH20 (10/03/24 0415)  Plateau Pressure: 21.7 cmH20 (10/02/24 1526)  Total Ve: 6.75 L/m (10/03/24 0415)  F/VT Ratio<105 (RSBI): (!) 31.11 (10/03/24 0415)  Lines/Drains/Airways       Central Venous Catheter Line  Duration             Percutaneous Central Line - Triple Lumen  09/29/24 1435 Internal Jugular Left 3 days              Drain  Duration                  Chest Tube 09/29/24 1355 Tube - 1 Left Midaxillary 14 Fr. 3 days         NG/OG Tube 09/29/24 0650 Center mouth 3 days         Urethral Catheter 09/29/24 0720 Non-latex 3 days              Airway  Duration                  Airway - Non-Surgical 09/29/24 0640 Endotracheal Tube 3 days              Peripheral Intravenous Line  Duration                  Peripheral IV - Single Lumen 09/29/24 0620 20 G Left;Posterior Hand 3 days         Peripheral IV - Single Lumen 09/29/24 0635 20 G Distal;Posterior;Right Forearm 3 days                  Significant Labs:    CBC/Anemia Profile:  Recent Labs   Lab 10/01/24  0642 10/02/24  0555   WBC 11.49* 9.44   HGB 9.6* 9.4*   HCT 30.0* 29.3*    140*   MCV 92.0 91.8   RDW 14.0 13.7        Chemistries:  Recent Labs   Lab 10/01/24  0849 10/02/24  0555   * 139   K 3.6 2.8*   CL 98 107   CO2 30 28   BUN 36* 24*   CREATININE 1.00 0.55   CALCIUM 7.9* 7.5*   MG 2.7* 2.0   PHOS 3.2 1.7*       Recent Lab Results         10/03/24  0521   10/02/24  2334   10/02/24  1850   10/02/24  1121        POC Glucose 204   208   192   184               Significant Imaging:  I have reviewed all pertinent imaging results/findings within the past 24 hours.

## 2024-10-03 NOTE — PLAN OF CARE
Problem: Infection  Goal: Absence of Infection Signs and Symptoms  Outcome: Progressing  Intervention: Prevent or Manage Infection  Flowsheets (Taken 10/3/2024 0532)  Fever Reduction/Comfort Measures:   lightweight bedding   lightweight clothing  Infection Management: aseptic technique maintained  Isolation Precautions: precautions maintained     Problem: Skin Injury Risk Increased  Goal: Skin Health and Integrity  Outcome: Progressing  Intervention: Optimize Skin Protection  Flowsheets (Taken 10/3/2024 0532)  Pressure Reduction Techniques: weight shift assistance provided  Pressure Reduction Devices: positioning supports utilized     Problem: UTI (Urinary Tract Infection)  Goal: Improved Infection Symptoms  Outcome: Progressing  Intervention: Prevent Infection Progression  Flowsheets (Taken 10/3/2024 0532)  Fever Reduction/Comfort Measures:   lightweight bedding   lightweight clothing  Infection Management: aseptic technique maintained  Intervention: Facilitate Optimal Urinary Elimination  Flowsheets (Taken 10/3/2024 0532)  Urinary Elimination Promotion: catheter patency maintained

## 2024-10-04 NOTE — SUBJECTIVE & OBJECTIVE
Interval History/Significant Events:  Patient unresponsive    Review of Systems  Objective:     Vital Signs (Most Recent):  Temp: 98.8 °F (37.1 °C) (10/04/24 0530)  Pulse: 92 (10/04/24 0530)  Resp: 16 (10/04/24 0530)  BP: (!) 152/84 (10/04/24 0530)  SpO2: 100 % (10/04/24 0530) Vital Signs (24h Range):  Temp:  [98.8 °F (37.1 °C)-100.6 °F (38.1 °C)] 98.8 °F (37.1 °C)  Pulse:  [67-95] 92  Resp:  [11-23] 16  SpO2:  [99 %-100 %] 100 %  BP: (113-185)/(43-95) 152/84   Weight: 84.8 kg (186 lb 15.2 oz)  Body mass index is 36.51 kg/m².      Intake/Output Summary (Last 24 hours) at 10/4/2024 0618  Last data filed at 10/3/2024 2301  Gross per 24 hour   Intake 2991.86 ml   Output 712 ml   Net 2279.86 ml          Physical Exam  Vitals reviewed.   Constitutional:       Appearance: Normal appearance.      Interventions: She is not intubated.  HENT:      Head: Normocephalic and atraumatic.      Nose: Nose normal.      Mouth/Throat:      Mouth: Mucous membranes are dry.      Pharynx: Oropharynx is clear.   Eyes:      Extraocular Movements: Extraocular movements intact.      Conjunctiva/sclera: Conjunctivae normal.      Pupils: Pupils are equal, round, and reactive to light.   Cardiovascular:      Rate and Rhythm: Normal rate.      Heart sounds: Normal heart sounds. No murmur heard.  Pulmonary:      Effort: Pulmonary effort is normal. She is not intubated.      Breath sounds: Normal breath sounds.   Abdominal:      General: Abdomen is flat. Bowel sounds are normal.      Palpations: Abdomen is soft.   Musculoskeletal:         General: Normal range of motion.      Cervical back: Normal range of motion and neck supple.      Right lower leg: No edema.      Left lower leg: No edema.   Skin:     General: Skin is warm and dry.      Capillary Refill: Capillary refill takes less than 2 seconds.   Neurological:      General: No focal deficit present.      Mental Status: She is alert and oriented to person, place, and time.   Psychiatric:         " Mood and Affect: Mood normal.         Behavior: Behavior normal.            Vents:  Vent Mode: A/CMV-VC (10/04/24 0524)  Ventilator Initiated: Yes (09/29/24 0700)  Set Rate: 15 BPM (10/04/24 0524)  Vt Set: 450 mL (10/04/24 0524)  PEEP/CPAP: 8 cmH20 (10/04/24 0524)  Oxygen Concentration (%): 40 (10/04/24 0524)  Peak Airway Pressure: 20.7 cmH20 (10/04/24 0524)  Plateau Pressure: 23.6 cmH20 (10/03/24 1525)  Total Ve: 8.53 L/m (10/04/24 0524)  F/VT Ratio<105 (RSBI): (!) 35.56 (10/03/24 1525)  Lines/Drains/Airways       Drain  Duration                  Chest Tube 09/29/24 1355 Tube - 1 Left Midaxillary 14 Fr. 4 days         NG/OG Tube 09/29/24 0650 Center mouth 4 days         Urethral Catheter 09/29/24 0720 Non-latex 4 days              Airway  Duration                  Airway - Non-Surgical 09/29/24 0640 Endotracheal Tube 4 days              Peripheral Intravenous Line  Duration                  Peripheral IV - Single Lumen 09/29/24 0635 20 G Distal;Posterior;Right Forearm 4 days                  Significant Labs:    CBC/Anemia Profile:  No results for input(s): "WBC", "HGB", "HCT", "PLT", "MCV", "RDW", "IRON", "FERRITIN", "RETIC", "FOLATE", "[id number]", "OCCULTBLOOD" in the last 48 hours.     Chemistries:  Recent Labs   Lab 10/03/24  0539   *   K 4.8   CL 96*   CO2 32   BUN 26*   CREATININE 0.70   CALCIUM 8.9   MG 2.6*   PHOS 1.4*       Recent Lab Results  (Last 5 results in the past 24 hours)        10/04/24  0511   10/03/24  2332   10/03/24  1959   10/03/24  1751   10/03/24  1200        POC Glucose 153   165   121   216   238                              Significant Imaging:  I have reviewed all pertinent imaging results/findings within the past 24 hours.  "

## 2024-10-04 NOTE — PLAN OF CARE
Problem: UTI (Urinary Tract Infection)  Goal: Improved Infection Symptoms  Outcome: Progressing  Intervention: Prevent Infection Progression  Flowsheets (Taken 10/4/2024 0931)  Fever Reduction/Comfort Measures:   lightweight clothing   lightweight bedding  Intervention: Facilitate Optimal Urinary Elimination  Flowsheets (Taken 10/4/2024 0800)  Urinary Elimination Promotion: catheter patency maintained

## 2024-10-04 NOTE — PROGRESS NOTES
[facility]  Critical Care Medicine  Progress Note    Patient Name: Purnima Cardona  MRN: [id number]  Admission Date: 9/29/2024  Hospital Length of Stay: 5 days  Code Status: DNR  Attending Provider: Arnulfo Interiano MD  Primary Care Provider: Candida Slaughter FNP   Principal Problem: Cardiorespiratory arrest    Subjective:     HPI:  89 yo F with HTN and GERD presented from [facility] assistant living with AMS with EMS team concern for a facial droop with ED course notable for a nonresponsive patient in asystole with initiation of CPR and admission to ICU thereafter for evaluation and management of cardiac arrest.      Discussed with family at various points over the course of the day; she moved to the assisted living facility after a fall where she had a right sided arm dislocation that was treated.  Since then, she was largely been independent and participates with care; in fact, ADLs include walking, toileting and eating.  In the past few weeks, she has had decreased walking which they attributed to the weather where she complained of it being too hot.  Review of ED records with patient described as having a change in mental status within less than 24 hours.  ED course with the patient having CPR and [place].  She was noted to have VFib for which he received amiodarone as 1 of the arrhythmias during CPR.  Post thoracic EKG with first-degree AV block which is chronic for her.  Her troponin was unremarkable.  My assessment with the patient not following commands and intermittently withdrawing to pain.  Notably, ED course with notification that her oxygen had dropped to 90 despite being on 100% which was intervened on with 100% FiO2.  Early ICU course with patient having difficulty to oxygenate requiring a high PEEP/high FiO2 protocol.  Hysteresis curves with no over distention.  Workup included CTA head and neck to assess for facial droop complaints with no evidence of CVA, however, CTA neck  contrast timing was inadequate.  She was found to have a right middle lobe consolidation and a left-sided pneumothorax.  Upon transferred to the ICU following this imaging, her pneumothorax demonstrated evidence of progression to tension which warranted placement of a left pigtail catheter which was successful.  She is now undergoing a cooling protocol for management of her post arrest course along with treatment of septic shock with a pulmonary source of infection.  Family was updated over the course of the day and notified that neuro prognostication will be undertaken 48 hours from now.    Hospital/ICU Course:  No notes on file    Interval History/Significant Events:  Patient unresponsive    Review of Systems  Objective:     Vital Signs (Most Recent):  Temp: 98.8 °F (37.1 °C) (10/04/24 0530)  Pulse: 92 (10/04/24 0530)  Resp: 16 (10/04/24 0530)  BP: (!) 152/84 (10/04/24 0530)  SpO2: 100 % (10/04/24 0530) Vital Signs (24h Range):  Temp:  [98.8 °F (37.1 °C)-100.6 °F (38.1 °C)] 98.8 °F (37.1 °C)  Pulse:  [67-95] 92  Resp:  [11-23] 16  SpO2:  [99 %-100 %] 100 %  BP: (113-185)/(43-95) 152/84   Weight: 84.8 kg (186 lb 15.2 oz)  Body mass index is 36.51 kg/m².      Intake/Output Summary (Last 24 hours) at 10/4/2024 0618  Last data filed at 10/3/2024 2301  Gross per 24 hour   Intake 2991.86 ml   Output 712 ml   Net 2279.86 ml          Physical Exam  Vitals reviewed.   Constitutional:       Appearance: Normal appearance.      Interventions: She is not intubated.  HENT:      Head: Normocephalic and atraumatic.      Nose: Nose normal.      Mouth/Throat:      Mouth: Mucous membranes are dry.      Pharynx: Oropharynx is clear.   Eyes:      Extraocular Movements: Extraocular movements intact.      Conjunctiva/sclera: Conjunctivae normal.      Pupils: Pupils are equal, round, and reactive to light.   Cardiovascular:      Rate and Rhythm: Normal rate.      Heart sounds: Normal heart sounds. No murmur heard.  Pulmonary:       "Effort: Pulmonary effort is normal. She is not intubated.      Breath sounds: Normal breath sounds.   Abdominal:      General: Abdomen is flat. Bowel sounds are normal.      Palpations: Abdomen is soft.   Musculoskeletal:         General: Normal range of motion.      Cervical back: Normal range of motion and neck supple.      Right lower leg: No edema.      Left lower leg: No edema.   Skin:     General: Skin is warm and dry.      Capillary Refill: Capillary refill takes less than 2 seconds.   Neurological:      General: No focal deficit present.      Mental Status: She is alert and oriented to person, place, and time.   Psychiatric:         Mood and Affect: Mood normal.         Behavior: Behavior normal.            Vents:  Vent Mode: A/CMV-VC (10/04/24 0524)  Ventilator Initiated: Yes (09/29/24 0700)  Set Rate: 15 BPM (10/04/24 0524)  Vt Set: 450 mL (10/04/24 0524)  PEEP/CPAP: 8 cmH20 (10/04/24 0524)  Oxygen Concentration (%): 40 (10/04/24 0524)  Peak Airway Pressure: 20.7 cmH20 (10/04/24 0524)  Plateau Pressure: 23.6 cmH20 (10/03/24 1525)  Total Ve: 8.53 L/m (10/04/24 0524)  F/VT Ratio<105 (RSBI): (!) 35.56 (10/03/24 1525)  Lines/Drains/Airways       Drain  Duration                  Chest Tube 09/29/24 1355 Tube - 1 Left Midaxillary 14 Fr. 4 days         NG/OG Tube 09/29/24 0650 Center mouth 4 days         Urethral Catheter 09/29/24 0720 Non-latex 4 days              Airway  Duration                  Airway - Non-Surgical 09/29/24 0640 Endotracheal Tube 4 days              Peripheral Intravenous Line  Duration                  Peripheral IV - Single Lumen 09/29/24 0635 20 G Distal;Posterior;Right Forearm 4 days                  Significant Labs:    CBC/Anemia Profile:  No results for input(s): "WBC", "HGB", "HCT", "PLT", "MCV", "RDW", "IRON", "FERRITIN", "RETIC", "FOLATE", "[id number]", "OCCULTBLOOD" in the last 48 hours.     Chemistries:  Recent Labs   Lab 10/03/24  0539   *   K 4.8   CL 96*   CO2 32   BUN " 26*   CREATININE 0.70   CALCIUM 8.9   MG 2.6*   PHOS 1.4*       Recent Lab Results  (Last 5 results in the past 24 hours)        10/04/24  0511   10/03/24  2332   10/03/24  1959   10/03/24  1751   10/03/24  1200        POC Glucose 153   165   121   216   238                              Significant Imaging:  I have reviewed all pertinent imaging results/findings within the past 24 hours.    ABG  Recent Labs   Lab 10/01/24  0814   PH 7.46*   PO2 89   PCO2 44   HCO3 31.3*     Assessment/Plan:     Neuro  Encephalopathy, toxic  Off sedation for 48 hours no improving believe this is due to hypoxemia    Sedated due to medication  Medicines off for 48 hours some spontaneous movement does not follow commands    Pulmonary  On mechanically assisted ventilation  Me extubate for comfort care today    Pneumonia of right lower lobe due to infectious organism  Continue antibiotics until we transitioned to comfort care    Pneumothorax, left  If patient survives extubation will remove chest tube    Cardiac/Vascular  * Cardiorespiratory arrest  Family to withdraw life support today and provide comfort care    Renal/  Hypophosphatemia  Supplement awaiting for today's level    Hypokalemia  Supplement    JUDITH (acute kidney injury)  Resolved    ID  Septic shock  Patient has positive urine culture for E coli and respiratory culture for staph and strep currently getting vanc and ceftriaxone I would continue those medicines want to wait and see what the sensitivities to the 2 organisms what in the patient was lung are prior to stopping vancomycin.  Shock has resolved    Endocrine  Stress hyperglycemia  Monitor    Orthopedic  Dislocation of right shoulder joint  X-ray shows chronic right shoulder dislocation awaiting ortho consult             Arnulfo Interiano MD  Critical Care Medicine  [facility]

## 2024-10-04 NOTE — PLAN OF CARE
Per notes pt remains on vent. Remains on IV ABX and has chest tubes. Monitoring labs with [place] PRN. Family to possible withdraw care this day. Following DC needs as arise.

## 2024-10-04 NOTE — PLAN OF CARE
Problem: Adult Inpatient Plan of Care  Goal: Plan of Care Review  Outcome: Progressing  Flowsheets (Taken 10/4/2024 0500)  Plan of Care Reviewed With: family  Goal: Patient-Specific Goal (Individualized)  Outcome: Progressing  Goal: Absence of Hospital-Acquired Illness or Injury  Outcome: Progressing  Intervention: Identify and Manage Fall Risk  Flowsheets (Taken 10/4/2024 0500)  Safety Promotion/Fall Prevention:   bed alarm set   side rails raised x 3  Intervention: Prevent Skin Injury  Flowsheets (Taken 10/4/2024 0500)  Skin Protection: incontinence pads utilized  Device Skin Pressure Protection:   skin-to-device areas padded   skin-to-skin areas padded  Intervention: Prevent and Manage VTE (Venous Thromboembolism) Risk  Flowsheets (Taken 10/4/2024 0500)  VTE Prevention/Management: remove, assess skin, and reapply sequential compression device  Intervention: Prevent Infection  Flowsheets (Taken 10/4/2024 0500)  Infection Prevention:   hand hygiene promoted   personal protective equipment utilized  Goal: Optimal Comfort and Wellbeing  Outcome: Progressing  Intervention: Monitor Pain and Promote Comfort  Flowsheets (Taken 10/4/2024 0500)  Pain Management Interventions:   pillow support provided   quiet environment facilitated   relaxation techniques promoted  Intervention: Provide Person-Centered Care  Flowsheets (Taken 10/4/2024 0500)  Trust Relationship/Rapport: care explained  Goal: Readiness for Transition of Care  Outcome: Progressing     Problem: Skin Injury Risk Increased  Goal: Skin Health and Integrity  Outcome: Progressing  Intervention: Optimize Skin Protection  Flowsheets (Taken 10/4/2024 0500)  Pressure Reduction Techniques: weight shift assistance provided  Pressure Reduction Devices: positioning supports utilized  Skin Protection: incontinence pads utilized  Activity Management: Patient unable to perform activities  Head of Bed (HOB) Positioning: HOB at 30 degrees

## 2024-10-05 NOTE — DISCHARGE SUMMARY
[facility]  Critical Care Medicine  Discharge Summary      Patient Name: Purnima Cardona  MRN: [id number]  Admission Date: 9/29/2024  Hospital Length of Stay: 6 days  Discharge Date and Time:  10/05/2024 2:42 PM  Attending Physician: Arnulfo Interiano MD   Discharging Provider: Joanne Dave AG-ACNP  Primary Care Provider: Candida Slaughter FNP  Reason for Admission: cardiac arrest     HPI:   89 yo F with HTN and GERD presented from [facility] living with AMS with EMS team concern for a facial droop with ED course notable for a nonresponsive patient in asystole with initiation of CPR and admission to ICU thereafter for evaluation and management of cardiac arrest.      Discussed with family at various points over the course of the day; she moved to the assisted living facility after a fall where she had a right sided arm dislocation that was treated.  Since then, she was largely been independent and participates with care; in fact, ADLs include walking, toileting and eating.  In the past few weeks, she has had decreased walking which they attributed to the weather where she complained of it being too hot.  Review of ED records with patient described as having a change in mental status within less than 24 hours.  ED course with the patient having CPR and [place].  She was noted to have VFib for which he received amiodarone as 1 of the arrhythmias during CPR.  Post thoracic EKG with first-degree AV block which is chronic for her.  Her troponin was unremarkable.  My assessment with the patient not following commands and intermittently withdrawing to pain.  Notably, ED course with notification that her oxygen had dropped to 90 despite being on 100% which was intervened on with 100% FiO2.  Early ICU course with patient having difficulty to oxygenate requiring a high PEEP/high FiO2 protocol.  Hysteresis curves with no over distention.  Workup included CTA head and neck to assess for facial  droop complaints with no evidence of CVA, however, CTA neck contrast timing was inadequate.  She was found to have a right middle lobe consolidation and a left-sided pneumothorax.  Upon transferred to the ICU following this imaging, her pneumothorax demonstrated evidence of progression to tension which warranted placement of a left pigtail catheter which was successful.  She is now undergoing a cooling protocol for management of her post arrest course along with treatment of septic shock with a pulmonary source of infection.  Family was updated over the course of the day and notified that neuro prognostication will be undertaken 48 hours from now.    * No surgery found *    Indwelling Lines/Drains at Time of Discharge:   Lines/Drains/Airways       Drain  Duration                  Chest Tube 09/29/24 1355 Tube - 1 Left Midaxillary 14 Fr. 6 days         NG/OG Tube 09/29/24 0650 Center mouth 6 days         Urethral Catheter 09/29/24 0720 Non-latex 6 days              Airway  Duration                  Airway - Non-Surgical 09/29/24 0640 Endotracheal Tube 6 days                  Hospital Course:   89 yo F with HTN and GERD presented from [facility] with [facility] with EMS team concern for a facial droop with ED course notable for a nonresponsive patient in asystole with initiation of CPR and admission to ICU thereafter for evaluation and management of cardiac arrest.       Discussed with family at various points over the course of the day; she moved to the assisted living facility after a fall where she had a right sided arm dislocation that was treated.  Since then, she was largely been independent and participates with care; in fact, ADLs include walking, toileting and eating.  In the past few weeks, she has had decreased walking which they attributed to the weather where she complained of it being too hot.  Review of ED records with patient described as having a change in mental status within less than 24  hours.  ED course with the patient having CPR and [place].  She was noted to have VFib for which he received amiodarone as 1 of the arrhythmias during CPR.  Post thoracic EKG with first-degree AV block which is chronic for her.  Her troponin was unremarkable.  My assessment with the patient not following commands and intermittently withdrawing to pain.  Notably, ED course with notification that her oxygen had dropped to 90 despite being on 100% which was intervened on with 100% FiO2.  Early ICU course with patient having difficulty to oxygenate requiring a high PEEP/high FiO2 protocol.  Hysteresis curves with no over distention.  Workup included CTA head and neck to assess for facial droop complaints with no evidence of CVA, however, CTA neck contrast timing was inadequate.  She was found to have a right middle lobe consolidation and a left-sided pneumothorax.  Upon transferred to the ICU following this imaging, her pneumothorax demonstrated evidence of progression to tension which warranted placement of a left pigtail catheter which was successful.  She is now undergoing a cooling protocol for management of her post arrest course along with treatment of septic shock with a pulmonary source of infection.  Family was updated over the course of the day and notified that neuro prognostication will be undertaken 48 hours from now.    Hospital Course - pt was placed on targeted temp management and broad spectrum abx. Cultures grew out ecoli in her urine and MRSA in her sputum. She was treated appropriately. After she was re-warmed per protocol an EEG was done that revealed severe global cerebral dysfunction. Results were discussed with family who made to decision to make her comfort care once all of her children from out of town arrived. She was extuabted to comfort measures only this morning. Time of death was pronounced at 1422. Family was at bedside.      Hypoxic encephalopathy no urinary tract infection  Consults  (From admission, onward)          Status Ordering Provider     Inpatient consult to Registered Dietitian/Nutritionist  Once        Provider:  (Not yet assigned)    Completed YANELY EVANS     Inpatient consult to Orthopedic Surgery  Once        Provider:  Yang Askew MD    Acknowledged MBAE, JUNE          Significant Labs:  All pertinent labs within the past 24 hours have been reviewed.    Significant Imaging:  I have reviewed all pertinent imaging results/findings within the past 24 hours.    Pending Diagnostic Studies:       Procedure Component Value Units Date/Time    EXTRA TUBES [[id number]] Collected: 09/29/24 1750    Order Status: Sent Lab Status: In process Updated: 09/29/24 1750    Specimen: Blood, Venous     Narrative:      The following orders were created for panel order EXTRA TUBES.  Procedure                               Abnormality         Status                     ---------                               -----------         ------                     Lavender Top Hold[[id number]]                               In process                   Please view results for these tests on the individual orders.    Glucose, random [[id number]] Collected: 09/29/24 1118    Order Status: Sent Lab Status: No result     Specimen: Blood           Final Active Diagnoses:    Diagnosis Date Noted POA    PRINCIPAL PROBLEM:  Cardiorespiratory arrest [I46.9] 09/29/2024 Yes    Hypophosphatemia [E83.39] 10/03/2024 No    Hypokalemia [E87.6] 10/01/2024 Yes    Encephalopathy, toxic [G92.9] 10/01/2024 Yes    Septic shock [A41.9, R65.21] 09/29/2024 Yes    JUDITH (acute kidney injury) [N17.9] 09/29/2024 Yes    Pneumothorax, left [J93.9] 09/29/2024 No    Pneumonia of right lower lobe due to infectious organism [J18.9] 09/29/2024 Yes    Sedated due to medication [R40.4, T50.905A] 09/29/2024 No    On mechanically assisted ventilation [Z99.11] 09/29/2024 Not Applicable    Stress hyperglycemia [R73.9] 09/29/2024 Yes    Dislocation  of right shoulder joint [S43.004A] 2024 Yes      Problems Resolved During this Admission:     No new Assessment & Plan notes have been filed under this hospital service since the last note was generated.  Service: Critical Care Medicine    Discharged Condition:     Disposition:       Patient Instructions:   No discharge procedures on file.  Medications:  None     CHRISTIAN GregoryACNP  Critical Care Medicine  [facility]

## 2024-10-05 NOTE — ASSESSMENT & PLAN NOTE
Family to withdraw life support today and provide comfort care  .  Patient withdraws to pain but does not follow commands not sure she had been will protect her airway

## 2024-10-05 NOTE — ASSESSMENT & PLAN NOTE
Shock has resolved cultures positive for Citrobacter in methicillin-resistant Staph aureus.  Continue antibiotics until we extubate

## 2024-10-05 NOTE — PLAN OF CARE
Problem: Adult Inpatient Plan of Care  Goal: Absence of Hospital-Acquired Illness or Injury  Outcome: Progressing     Problem: Adult Inpatient Plan of Care  Goal: Optimal Comfort and Wellbeing  Outcome: Progressing     Problem: UTI (Urinary Tract Infection)  Goal: Improved Infection Symptoms  Outcome: Progressing

## 2024-10-05 NOTE — PLAN OF CARE
Problem: Mechanical Ventilation Invasive  Goal: Effective Communication  Outcome: Progressing  Goal: Optimal Device Function  Outcome: Progressing  Goal: Mechanical Ventilation Liberation  Outcome: Progressing  Goal: Optimal Nutrition Delivery  Outcome: Progressing  Goal: Absence of Device-Related Skin and Tissue Injury  Outcome: Progressing  Goal: Absence of Ventilator-Induced Lung Injury  Outcome: Progressing

## 2024-10-05 NOTE — SUBJECTIVE & OBJECTIVE
Interval History/Significant Events:  Withdraws to pain    Review of Systems  Objective:     Vital Signs (Most Recent):  Temp: 97.5 °F (36.4 °C) (10/05/24 0400)  Pulse: 85 (10/05/24 0400)  Resp: 15 (10/05/24 0400)  BP: (!) 165/76 (10/05/24 0330)  SpO2: 100 % (10/05/24 0400) Vital Signs (24h Range):  Temp:  [90 °F (32.2 °C)-100.4 °F (38 °C)] 97.5 °F (36.4 °C)  Pulse:  [70-92] 85  Resp:  [11-19] 15  SpO2:  [92 %-100 %] 100 %  BP: (112-165)/(43-76) 165/76   Weight: 85 kg (187 lb 6.3 oz)  Body mass index is 36.6 kg/m².      Intake/Output Summary (Last 24 hours) at 10/5/2024 0632  Last data filed at 10/5/2024 0600  Gross per 24 hour   Intake 640 ml   Output 1670 ml   Net -1030 ml          Physical Exam  Vitals reviewed.   Constitutional:       Appearance: Normal appearance.      Interventions: She is not intubated.  HENT:      Head: Normocephalic and atraumatic.      Nose: Nose normal.      Mouth/Throat:      Mouth: Mucous membranes are dry.      Pharynx: Oropharynx is clear.   Eyes:      Extraocular Movements: Extraocular movements intact.      Conjunctiva/sclera: Conjunctivae normal.      Pupils: Pupils are equal, round, and reactive to light.   Cardiovascular:      Rate and Rhythm: Normal rate.      Heart sounds: Normal heart sounds. No murmur heard.  Pulmonary:      Effort: Pulmonary effort is normal. She is not intubated.      Breath sounds: Normal breath sounds.   Abdominal:      General: Abdomen is flat. Bowel sounds are normal.      Palpations: Abdomen is soft.   Musculoskeletal:         General: Normal range of motion.      Cervical back: Normal range of motion and neck supple.      Right lower leg: No edema.      Left lower leg: No edema.   Skin:     General: Skin is warm and dry.      Capillary Refill: Capillary refill takes less than 2 seconds.   Neurological:      General: No focal deficit present.      Mental Status: She is alert and oriented to person, place, and time.   Psychiatric:         Mood and  "Affect: Mood normal.         Behavior: Behavior normal.            Vents:  Vent Mode: A/CMV-VC (10/05/24 0340)  Ventilator Initiated: Yes (09/29/24 0700)  Set Rate: 15 BPM (10/05/24 0340)  Vt Set: 450 mL (10/05/24 0340)  PEEP/CPAP: 8 cmH20 (10/05/24 0340)  Oxygen Concentration (%): 40 (10/05/24 0340)  Peak Airway Pressure: 29.7 cmH20 (10/05/24 0340)  Plateau Pressure: 18.3 cmH20 (10/05/24 0340)  Total Ve: 6.4 L/m (10/05/24 0340)  F/VT Ratio<105 (RSBI): (!) 33.33 (10/05/24 0340)  Lines/Drains/Airways       Drain  Duration                  Chest Tube 09/29/24 1355 Tube - 1 Left Midaxillary 14 Fr. 5 days         NG/OG Tube 09/29/24 0650 Center mouth 5 days         Urethral Catheter 09/29/24 0720 Non-latex 5 days              Airway  Duration                  Airway - Non-Surgical 09/29/24 0640 Endotracheal Tube 5 days              Peripheral Intravenous Line  Duration                  Peripheral IV - Single Lumen 09/29/24 0635 20 G Distal;Posterior;Right Forearm 5 days                  Significant Labs:    CBC/Anemia Profile:  No results for input(s): "WBC", "HGB", "HCT", "PLT", "MCV", "RDW", "IRON", "FERRITIN", "RETIC", "FOLATE", "[id number]", "OCCULTBLOOD" in the last 48 hours.     Chemistries:  Recent Labs   Lab 10/04/24  1139   BUN 27*   CREATININE 0.63       Recent Lab Results         10/05/24  0531   10/04/24  2324   10/04/24  2122   10/04/24  1139        BUN       27       BUN/CREAT RATIO       43       Creatinine       0.63       eGFR       84       POC Glucose 103   115   138                 Significant Imaging:  I have reviewed all pertinent imaging results/findings within the past 24 hours.  "

## 2024-10-05 NOTE — PROGRESS NOTES
[facility]  Critical Care Medicine  Progress Note    Patient Name: Purnima Cardona  MRN: [id number]  Admission Date: 9/29/2024  Hospital Length of Stay: 6 days  Code Status: DNR  Attending Provider: Arnulfo Interiano MD  Primary Care Provider: Candida Slaughter FNP   Principal Problem: Cardiorespiratory arrest    Subjective:     HPI:  89 yo F with HTN and GERD presented from [facility] assistant living with AMS with EMS team concern for a facial droop with ED course notable for a nonresponsive patient in asystole with initiation of CPR and admission to ICU thereafter for evaluation and management of cardiac arrest.      Discussed with family at various points over the course of the day; she moved to the assisted living facility after a fall where she had a right sided arm dislocation that was treated.  Since then, she was largely been independent and participates with care; in fact, ADLs include walking, toileting and eating.  In the past few weeks, she has had decreased walking which they attributed to the weather where she complained of it being too hot.  Review of ED records with patient described as having a change in mental status within less than 24 hours.  ED course with the patient having CPR and [place].  She was noted to have VFib for which he received amiodarone as 1 of the arrhythmias during CPR.  Post thoracic EKG with first-degree AV block which is chronic for her.  Her troponin was unremarkable.  My assessment with the patient not following commands and intermittently withdrawing to pain.  Notably, ED course with notification that her oxygen had dropped to 90 despite being on 100% which was intervened on with 100% FiO2.  Early ICU course with patient having difficulty to oxygenate requiring a high PEEP/high FiO2 protocol.  Hysteresis curves with no over distention.  Workup included CTA head and neck to assess for facial droop complaints with no evidence of CVA, however, CTA neck  contrast timing was inadequate.  She was found to have a right middle lobe consolidation and a left-sided pneumothorax.  Upon transferred to the ICU following this imaging, her pneumothorax demonstrated evidence of progression to tension which warranted placement of a left pigtail catheter which was successful.  She is now undergoing a cooling protocol for management of her post arrest course along with treatment of septic shock with a pulmonary source of infection.  Family was updated over the course of the day and notified that neuro prognostication will be undertaken 48 hours from now.    Hospital/ICU Course:  No notes on file    Interval History/Significant Events:  Withdraws to pain    Review of Systems  Objective:     Vital Signs (Most Recent):  Temp: 97.5 °F (36.4 °C) (10/05/24 0400)  Pulse: 85 (10/05/24 0400)  Resp: 15 (10/05/24 0400)  BP: (!) 165/76 (10/05/24 0330)  SpO2: 100 % (10/05/24 0400) Vital Signs (24h Range):  Temp:  [90 °F (32.2 °C)-100.4 °F (38 °C)] 97.5 °F (36.4 °C)  Pulse:  [70-92] 85  Resp:  [11-19] 15  SpO2:  [92 %-100 %] 100 %  BP: (112-165)/(43-76) 165/76   Weight: 85 kg (187 lb 6.3 oz)  Body mass index is 36.6 kg/m².      Intake/Output Summary (Last 24 hours) at 10/5/2024 0632  Last data filed at 10/5/2024 0600  Gross per 24 hour   Intake 640 ml   Output 1670 ml   Net -1030 ml          Physical Exam  Vitals reviewed.   Constitutional:       Appearance: Normal appearance.      Interventions: She is not intubated.  HENT:      Head: Normocephalic and atraumatic.      Nose: Nose normal.      Mouth/Throat:      Mouth: Mucous membranes are dry.      Pharynx: Oropharynx is clear.   Eyes:      Extraocular Movements: Extraocular movements intact.      Conjunctiva/sclera: Conjunctivae normal.      Pupils: Pupils are equal, round, and reactive to light.   Cardiovascular:      Rate and Rhythm: Normal rate.      Heart sounds: Normal heart sounds. No murmur heard.  Pulmonary:      Effort: Pulmonary  "effort is normal. She is not intubated.      Breath sounds: Normal breath sounds.   Abdominal:      General: Abdomen is flat. Bowel sounds are normal.      Palpations: Abdomen is soft.   Musculoskeletal:         General: Normal range of motion.      Cervical back: Normal range of motion and neck supple.      Right lower leg: No edema.      Left lower leg: No edema.   Skin:     General: Skin is warm and dry.      Capillary Refill: Capillary refill takes less than 2 seconds.   Neurological:      General: No focal deficit present.      Mental Status: She is alert and oriented to person, place, and time.   Psychiatric:         Mood and Affect: Mood normal.         Behavior: Behavior normal.            Vents:  Vent Mode: A/CMV-VC (10/05/24 0340)  Ventilator Initiated: Yes (09/29/24 0700)  Set Rate: 15 BPM (10/05/24 0340)  Vt Set: 450 mL (10/05/24 0340)  PEEP/CPAP: 8 cmH20 (10/05/24 0340)  Oxygen Concentration (%): 40 (10/05/24 0340)  Peak Airway Pressure: 29.7 cmH20 (10/05/24 0340)  Plateau Pressure: 18.3 cmH20 (10/05/24 0340)  Total Ve: 6.4 L/m (10/05/24 0340)  F/VT Ratio<105 (RSBI): (!) 33.33 (10/05/24 0340)  Lines/Drains/Airways       Drain  Duration                  Chest Tube 09/29/24 1355 Tube - 1 Left Midaxillary 14 Fr. 5 days         NG/OG Tube 09/29/24 0650 Center mouth 5 days         Urethral Catheter 09/29/24 0720 Non-latex 5 days              Airway  Duration                  Airway - Non-Surgical 09/29/24 0640 Endotracheal Tube 5 days              Peripheral Intravenous Line  Duration                  Peripheral IV - Single Lumen 09/29/24 0635 20 G Distal;Posterior;Right Forearm 5 days                  Significant Labs:    CBC/Anemia Profile:  No results for input(s): "WBC", "HGB", "HCT", "PLT", "MCV", "RDW", "IRON", "FERRITIN", "RETIC", "FOLATE", "[id number]", "OCCULTBLOOD" in the last 48 hours.     Chemistries:  Recent Labs   Lab 10/04/24  1139   BUN 27*   CREATININE 0.63       Recent Lab Results         " 10/05/24  0531   10/04/24  2324   10/04/24  2122   10/04/24  1139        BUN       27       BUN/CREAT RATIO       43       Creatinine       0.63       eGFR       84       POC Glucose 103   115   138                 Significant Imaging:  I have reviewed all pertinent imaging results/findings within the past 24 hours.    ABG  Recent Labs   Lab 10/01/24  0814   PH 7.46*   PO2 89   PCO2 44   HCO3 31.3*     Assessment/Plan:     Neuro  Encephalopathy, toxic  Off sedation for 48 hours no improving believe this is due to hypoxemia    Sedated due to medication  No sedation for several days no change neurologic status    Pulmonary  On mechanically assisted ventilation   extubate for comfort care today    Pneumonia of right lower lobe due to infectious organism  Continue antibiotics until we transitioned to comfort care    Pneumothorax, left  If patient survives extubation will remove chest tube    Cardiac/Vascular  * Cardiorespiratory arrest  Family to withdraw life support today and provide comfort care  .  Patient withdraws to pain but does not follow commands not sure she had been will protect her airway    Renal/  Hypophosphatemia  Supplement awaiting for today's level    Hypokalemia  Supplement    JUDITH (acute kidney injury)  Resolved    ID  Septic shock  Shock has resolved cultures positive for Citrobacter in methicillin-resistant Staph aureus.  Continue antibiotics until we extubate    Endocrine  Stress hyperglycemia  At goal    Orthopedic  Dislocation of right shoulder joint  X-ray shows chronic right shoulder dislocation awaiting ortho consult             Arnulfo Interiano MD  Critical Care Medicine  [facility] ICU

## 2024-10-05 NOTE — HOSPITAL COURSE
89 yo F with HTN and GERD presented from [facility] living with AMS with EMS team concern for a facial droop with ED course notable for a nonresponsive patient in asystole with initiation of CPR and admission to ICU thereafter for evaluation and management of cardiac arrest.       Discussed with family at various points over the course of the day; she moved to the assisted living facility after a fall where she had a right sided arm dislocation that was treated.  Since then, she was largely been independent and participates with care; in fact, ADLs include walking, toileting and eating.  In the past few weeks, she has had decreased walking which they attributed to the weather where she complained of it being too hot.  Review of ED records with patient described as having a change in mental status within less than 24 hours.  ED course with the patient having CPR and [place].  She was noted to have VFib for which he received amiodarone as 1 of the arrhythmias during CPR.  Post thoracic EKG with first-degree AV block which is chronic for her.  Her troponin was unremarkable.  My assessment with the patient not following commands and intermittently withdrawing to pain.  Notably, ED course with notification that her oxygen had dropped to 90 despite being on 100% which was intervened on with 100% FiO2.  Early ICU course with patient having difficulty to oxygenate requiring a high PEEP/high FiO2 protocol.  Hysteresis curves with no over distention.  Workup included CTA head and neck to assess for facial droop complaints with no evidence of CVA, however, CTA neck contrast timing was inadequate.  She was found to have a right middle lobe consolidation and a left-sided pneumothorax.  Upon transferred to the ICU following this imaging, her pneumothorax demonstrated evidence of progression to tension which warranted placement of a left pigtail catheter which was successful.  She is now undergoing a cooling protocol for  management of her post arrest course along with treatment of septic shock with a pulmonary source of infection.  Family was updated over the course of the day and notified that neuro prognostication will be undertaken 48 hours from now.    Hospital Course - pt was placed on targeted temp management and broad spectrum abx. Cultures grew out ecoli in her urine and MRSA in her sputum. She was treated appropriately. After she was re-warmed per protocol an EEG was done that revealed severe global cerebral dysfunction. Results were discussed with family who made to decision to make her comfort care once all of her children from out of town arrived. She was extuabted to comfort measures only this morning. Time of death was pronounced at 1422. Family was at bedside.

## 2024-10-07 NOTE — PLAN OF CARE
[facility] Quality Flow/Interdisciplinary Rounds Progress Note    Quality Flow Rounds held on April 26, 2022 at [address] Attending:  Bedside Nurse, ,  and Nursing Unit Leadership    Barriers to Discharge: ARU Auth    Anticipated Discharge Date:       Anticipated Discharge Disposition: ARU    Readmission Risk              Risk of Unplanned Readmission:  21           Discussed patient goal for the day, patient clinical progression, and barriers to discharge. The following Goal(s) of the Day/Commitment(s) have been identified:  Activity Progression  Transitional Care Plan      Contacted Noemi in Admissions with [facility], she states she has not yet received Pre-Cert for patient and has not heard from their insurance. CM spoke with patient who expresses severe frustration that she has not yet been approved and fears that this will hamper her recovery. Patient states she is willing to wait until tomorrow for insurance approval.  Transport paperwork faxed to [address] [place] [address],[address] requesting transport late tomorrow afternoon (1700) in anticipation of Auth approval 04/27. Will need to cancel transport if Pre-Cert not obtained.         Estefani Davidson RN  April 26, 2022 [facility] ICU  Discharge Final Note    Primary Care Provider: Candida Slaughter FNP    Expected Discharge Date: 10/15/2024    Final Discharge Note (most recent)       Final Note - 10/07/24 1051          Final Note    Assessment Type Final Discharge Note     Anticipated Discharge Disposition         Post-Acute Status    Discharge Delays None known at this time                     Important Message from Medicare  Important Message from Medicare regarding Discharge Appeal Rights: Given to patient/caregiver, Explained to patient/caregiver, Signed/date by patient/caregiver     Date IMM was signed: 24  Time IMM was signed: 1500      Pt